# Patient Record
Sex: MALE | Race: WHITE | NOT HISPANIC OR LATINO | Employment: OTHER | URBAN - METROPOLITAN AREA
[De-identification: names, ages, dates, MRNs, and addresses within clinical notes are randomized per-mention and may not be internally consistent; named-entity substitution may affect disease eponyms.]

---

## 2018-02-08 ENCOUNTER — CONVERSION ENCOUNTER (OUTPATIENT)
Dept: FAMILY MEDICINE CLINIC | Facility: CLINIC | Age: 53
End: 2018-02-08

## 2018-02-08 ENCOUNTER — OFFICE VISIT CONVERTED (OUTPATIENT)
Dept: FAMILY MEDICINE CLINIC | Facility: CLINIC | Age: 53
End: 2018-02-08
Attending: NURSE PRACTITIONER

## 2018-03-08 ENCOUNTER — OFFICE VISIT CONVERTED (OUTPATIENT)
Dept: FAMILY MEDICINE CLINIC | Facility: CLINIC | Age: 53
End: 2018-03-08
Attending: NURSE PRACTITIONER

## 2018-04-26 ENCOUNTER — OFFICE VISIT CONVERTED (OUTPATIENT)
Dept: OTOLARYNGOLOGY | Facility: CLINIC | Age: 53
End: 2018-04-26
Attending: OTOLARYNGOLOGY

## 2018-06-19 ENCOUNTER — CONVERSION ENCOUNTER (OUTPATIENT)
Dept: OTOLARYNGOLOGY | Facility: CLINIC | Age: 53
End: 2018-06-19

## 2018-06-19 ENCOUNTER — OFFICE VISIT CONVERTED (OUTPATIENT)
Dept: OTOLARYNGOLOGY | Facility: CLINIC | Age: 53
End: 2018-06-19
Attending: OTOLARYNGOLOGY

## 2020-04-10 ENCOUNTER — HOSPITAL ENCOUNTER (OUTPATIENT)
Dept: GENERAL RADIOLOGY | Facility: HOSPITAL | Age: 55
Discharge: HOME | End: 2020-04-10
Attending: NURSE PRACTITIONER

## 2020-04-10 ENCOUNTER — OFFICE VISIT CONVERTED (OUTPATIENT)
Dept: FAMILY MEDICINE CLINIC | Facility: CLINIC | Age: 55
End: 2020-04-10
Attending: NURSE PRACTITIONER

## 2020-06-24 ENCOUNTER — CONVERSION ENCOUNTER (OUTPATIENT)
Dept: FAMILY MEDICINE CLINIC | Facility: CLINIC | Age: 55
End: 2020-06-24

## 2020-06-24 ENCOUNTER — OFFICE VISIT CONVERTED (OUTPATIENT)
Dept: FAMILY MEDICINE CLINIC | Facility: CLINIC | Age: 55
End: 2020-06-24
Attending: NURSE PRACTITIONER

## 2020-06-26 ENCOUNTER — HOSPITAL ENCOUNTER (OUTPATIENT)
Dept: LAB | Facility: HOSPITAL | Age: 55
Discharge: HOME | End: 2020-06-26
Attending: NURSE PRACTITIONER

## 2020-06-26 LAB
ALBUMIN SERPL-MCNC: 4.5 G/DL (ref 3.5–5)
ALBUMIN/GLOB SERPL: 1.6 {RATIO} (ref 1.4–2.6)
ALP SERPL-CCNC: 57 U/L (ref 56–119)
ALT SERPL-CCNC: 22 U/L (ref 10–40)
ANION GAP SERPL CALC-SCNC: 17 MMOL/L (ref 8–19)
AST SERPL-CCNC: 19 U/L (ref 15–50)
BASOPHILS # BLD AUTO: 0.03 10*3/UL (ref 0–0.2)
BASOPHILS NFR BLD AUTO: 0.5 % (ref 0–3)
BILIRUB SERPL-MCNC: 0.44 MG/DL (ref 0.2–1.3)
BUN SERPL-MCNC: 12 MG/DL (ref 5–25)
BUN/CREAT SERPL: 14 {RATIO} (ref 6–20)
CALCIUM SERPL-MCNC: 9.7 MG/DL (ref 8.7–10.4)
CHLORIDE SERPL-SCNC: 100 MMOL/L (ref 99–111)
CHOLEST SERPL-MCNC: 175 MG/DL (ref 107–200)
CHOLEST/HDLC SERPL: 3.8 {RATIO} (ref 3–6)
CONV ABS IMM GRAN: 0.02 10*3/UL (ref 0–0.2)
CONV CO2: 21 MMOL/L (ref 22–32)
CONV IMMATURE GRAN: 0.3 % (ref 0–1.8)
CONV TOTAL PROTEIN: 7.3 G/DL (ref 6.3–8.2)
CREAT UR-MCNC: 0.83 MG/DL (ref 0.7–1.2)
DEPRECATED RDW RBC AUTO: 41.7 FL (ref 35.1–43.9)
EOSINOPHIL # BLD AUTO: 0.25 10*3/UL (ref 0–0.7)
EOSINOPHIL # BLD AUTO: 4.2 % (ref 0–7)
ERYTHROCYTE [DISTWIDTH] IN BLOOD BY AUTOMATED COUNT: 12.2 % (ref 11.6–14.4)
GFR SERPLBLD BASED ON 1.73 SQ M-ARVRAT: >60 ML/MIN/{1.73_M2}
GLOBULIN UR ELPH-MCNC: 2.8 G/DL (ref 2–3.5)
GLUCOSE SERPL-MCNC: 96 MG/DL (ref 70–99)
HCT VFR BLD AUTO: 43.5 % (ref 42–52)
HDLC SERPL-MCNC: 46 MG/DL (ref 40–60)
HGB BLD-MCNC: 14.8 G/DL (ref 14–18)
LDLC SERPL CALC-MCNC: 89 MG/DL (ref 70–100)
LYMPHOCYTES # BLD AUTO: 2.21 10*3/UL (ref 1–5)
LYMPHOCYTES NFR BLD AUTO: 36.8 % (ref 20–45)
MCH RBC QN AUTO: 31.6 PG (ref 27–31)
MCHC RBC AUTO-ENTMCNC: 34 G/DL (ref 33–37)
MCV RBC AUTO: 92.8 FL (ref 80–96)
MONOCYTES # BLD AUTO: 0.73 10*3/UL (ref 0.2–1.2)
MONOCYTES NFR BLD AUTO: 12.2 % (ref 3–10)
NEUTROPHILS # BLD AUTO: 2.76 10*3/UL (ref 2–8)
NEUTROPHILS NFR BLD AUTO: 46 % (ref 30–85)
NRBC CBCN: 0 % (ref 0–0.7)
OSMOLALITY SERPL CALC.SUM OF ELEC: 278 MOSM/KG (ref 273–304)
PLATELET # BLD AUTO: 286 10*3/UL (ref 130–400)
PMV BLD AUTO: 9.7 FL (ref 9.4–12.4)
POTASSIUM SERPL-SCNC: 4.3 MMOL/L (ref 3.5–5.3)
PSA SERPL-MCNC: 1.69 NG/ML (ref 0–4)
RBC # BLD AUTO: 4.69 10*6/UL (ref 4.7–6.1)
SODIUM SERPL-SCNC: 134 MMOL/L (ref 135–147)
T4 FREE SERPL-MCNC: 1 NG/DL (ref 0.9–1.8)
TRIGL SERPL-MCNC: 199 MG/DL (ref 40–150)
TSH SERPL-ACNC: 3.52 M[IU]/L (ref 0.27–4.2)
VLDLC SERPL-MCNC: 40 MG/DL (ref 5–37)
WBC # BLD AUTO: 6 10*3/UL (ref 4.8–10.8)

## 2020-07-21 ENCOUNTER — TELEMEDICINE CONVERTED (OUTPATIENT)
Dept: FAMILY MEDICINE CLINIC | Facility: CLINIC | Age: 55
End: 2020-07-21
Attending: NURSE PRACTITIONER

## 2020-10-27 ENCOUNTER — HOSPITAL ENCOUNTER (OUTPATIENT)
Dept: LAB | Facility: HOSPITAL | Age: 55
Discharge: HOME | End: 2020-10-27
Attending: INTERNAL MEDICINE

## 2020-10-27 ENCOUNTER — OFFICE VISIT CONVERTED (OUTPATIENT)
Dept: FAMILY MEDICINE CLINIC | Facility: CLINIC | Age: 55
End: 2020-10-27
Attending: INTERNAL MEDICINE

## 2020-10-27 LAB
BASOPHILS # BLD AUTO: 0.04 10*3/UL (ref 0–0.2)
BASOPHILS NFR BLD AUTO: 0.5 % (ref 0–3)
CONV ABS IMM GRAN: 0.03 10*3/UL (ref 0–0.2)
CONV IMMATURE GRAN: 0.4 % (ref 0–1.8)
DEPRECATED RDW RBC AUTO: 41.3 FL (ref 35.1–43.9)
EOSINOPHIL # BLD AUTO: 0.31 10*3/UL (ref 0–0.7)
EOSINOPHIL # BLD AUTO: 4.1 % (ref 0–7)
ERYTHROCYTE [DISTWIDTH] IN BLOOD BY AUTOMATED COUNT: 12.3 % (ref 11.6–14.4)
HCT VFR BLD AUTO: 43.1 % (ref 42–52)
HGB BLD-MCNC: 14.7 G/DL (ref 14–18)
LYMPHOCYTES # BLD AUTO: 2.76 10*3/UL (ref 1–5)
LYMPHOCYTES NFR BLD AUTO: 36.5 % (ref 20–45)
MCH RBC QN AUTO: 31.4 PG (ref 27–31)
MCHC RBC AUTO-ENTMCNC: 34.1 G/DL (ref 33–37)
MCV RBC AUTO: 92.1 FL (ref 80–96)
MONOCYTES # BLD AUTO: 0.8 10*3/UL (ref 0.2–1.2)
MONOCYTES NFR BLD AUTO: 10.6 % (ref 3–10)
NEUTROPHILS # BLD AUTO: 3.62 10*3/UL (ref 2–8)
NEUTROPHILS NFR BLD AUTO: 47.9 % (ref 30–85)
NRBC CBCN: 0 % (ref 0–0.7)
PLATELET # BLD AUTO: 307 10*3/UL (ref 130–400)
PMV BLD AUTO: 9.3 FL (ref 9.4–12.4)
RBC # BLD AUTO: 4.68 10*6/UL (ref 4.7–6.1)
WBC # BLD AUTO: 7.56 10*3/UL (ref 4.8–10.8)

## 2020-12-04 ENCOUNTER — HOSPITAL ENCOUNTER (OUTPATIENT)
Dept: URGENT CARE | Facility: CLINIC | Age: 55
Discharge: HOME | End: 2020-12-04
Attending: PHYSICIAN ASSISTANT

## 2020-12-09 LAB — SARS-COV-2 RNA SPEC QL NAA+PROBE: NOT DETECTED

## 2021-04-02 ENCOUNTER — CONVERSION ENCOUNTER (OUTPATIENT)
Dept: INTERNAL MEDICINE | Facility: CLINIC | Age: 56
End: 2021-04-02

## 2021-04-02 ENCOUNTER — OFFICE VISIT CONVERTED (OUTPATIENT)
Dept: INTERNAL MEDICINE | Facility: CLINIC | Age: 56
End: 2021-04-02
Attending: STUDENT IN AN ORGANIZED HEALTH CARE EDUCATION/TRAINING PROGRAM

## 2021-04-02 ENCOUNTER — HOSPITAL ENCOUNTER (OUTPATIENT)
Dept: OTHER | Facility: HOSPITAL | Age: 56
Discharge: HOME | End: 2021-04-02
Attending: STUDENT IN AN ORGANIZED HEALTH CARE EDUCATION/TRAINING PROGRAM

## 2021-04-02 LAB
ALBUMIN SERPL-MCNC: 4.7 G/DL (ref 3.5–5)
ALBUMIN/GLOB SERPL: 1.7 {RATIO} (ref 1.4–2.6)
ALP SERPL-CCNC: 62 U/L (ref 56–119)
ALT SERPL-CCNC: 21 U/L (ref 10–40)
ANION GAP SERPL CALC-SCNC: 18 MMOL/L (ref 8–19)
AST SERPL-CCNC: 23 U/L (ref 15–50)
BASOPHILS # BLD AUTO: 0.04 10*3/UL (ref 0–0.2)
BASOPHILS NFR BLD AUTO: 0.5 % (ref 0–3)
BILIRUB SERPL-MCNC: 0.36 MG/DL (ref 0.2–1.3)
BUN SERPL-MCNC: 10 MG/DL (ref 5–25)
BUN/CREAT SERPL: 11 {RATIO} (ref 6–20)
CALCIUM SERPL-MCNC: 9.8 MG/DL (ref 8.7–10.4)
CHLORIDE SERPL-SCNC: 96 MMOL/L (ref 99–111)
CHOLEST SERPL-MCNC: 186 MG/DL (ref 107–200)
CHOLEST/HDLC SERPL: 3.3 {RATIO} (ref 3–6)
CONV ABS IMM GRAN: 0.03 10*3/UL (ref 0–0.2)
CONV CO2: 24 MMOL/L (ref 22–32)
CONV IMMATURE GRAN: 0.4 % (ref 0–1.8)
CONV TOTAL PROTEIN: 7.4 G/DL (ref 6.3–8.2)
CREAT UR-MCNC: 0.92 MG/DL (ref 0.7–1.2)
DEPRECATED RDW RBC AUTO: 42.4 FL (ref 35.1–43.9)
EOSINOPHIL # BLD AUTO: 0.21 10*3/UL (ref 0–0.7)
EOSINOPHIL # BLD AUTO: 2.6 % (ref 0–7)
ERYTHROCYTE [DISTWIDTH] IN BLOOD BY AUTOMATED COUNT: 12.4 % (ref 11.6–14.4)
FOLATE SERPL-MCNC: 10.5 NG/ML (ref 4.8–20)
GFR SERPLBLD BASED ON 1.73 SQ M-ARVRAT: >60 ML/MIN/{1.73_M2}
GLOBULIN UR ELPH-MCNC: 2.7 G/DL (ref 2–3.5)
GLUCOSE SERPL-MCNC: 124 MG/DL (ref 70–99)
HCT VFR BLD AUTO: 45.1 % (ref 42–52)
HDLC SERPL-MCNC: 56 MG/DL (ref 40–60)
HGB BLD-MCNC: 15.1 G/DL (ref 14–18)
LDLC SERPL CALC-MCNC: 102 MG/DL (ref 70–100)
LYMPHOCYTES # BLD AUTO: 2.57 10*3/UL (ref 1–5)
LYMPHOCYTES NFR BLD AUTO: 32.2 % (ref 20–45)
MCH RBC QN AUTO: 31.1 PG (ref 27–31)
MCHC RBC AUTO-ENTMCNC: 33.5 G/DL (ref 33–37)
MCV RBC AUTO: 92.8 FL (ref 80–96)
MONOCYTES # BLD AUTO: 0.78 10*3/UL (ref 0.2–1.2)
MONOCYTES NFR BLD AUTO: 9.8 % (ref 3–10)
NEUTROPHILS # BLD AUTO: 4.34 10*3/UL (ref 2–8)
NEUTROPHILS NFR BLD AUTO: 54.5 % (ref 30–85)
NRBC CBCN: 0 % (ref 0–0.7)
OSMOLALITY SERPL CALC.SUM OF ELEC: 276 MOSM/KG (ref 273–304)
PLATELET # BLD AUTO: 289 10*3/UL (ref 130–400)
PMV BLD AUTO: 10.1 FL (ref 9.4–12.4)
POTASSIUM SERPL-SCNC: 4.7 MMOL/L (ref 3.5–5.3)
RBC # BLD AUTO: 4.86 10*6/UL (ref 4.7–6.1)
SODIUM SERPL-SCNC: 133 MMOL/L (ref 135–147)
TRIGL SERPL-MCNC: 138 MG/DL (ref 40–150)
TSH SERPL-ACNC: 3.25 M[IU]/L (ref 0.27–4.2)
VIT B12 SERPL-MCNC: 583 PG/ML (ref 211–911)
VLDLC SERPL-MCNC: 28 MG/DL (ref 5–37)
WBC # BLD AUTO: 7.97 10*3/UL (ref 4.8–10.8)

## 2021-04-03 LAB — HCV AB SER DONR QL: <0.1 S/CO RATIO (ref 0–0.9)

## 2021-05-10 NOTE — H&P
"   History and Physical      Patient Name: Eddie Casas   Patient ID: [id number]   Sex: Male   YOB: 1965    Primary Care Provider: Jose Antonio Rowan PA-C   Referring Provider: Mike Shane DO    Visit Date: 2021    Provider: Sid Mcknight MD   Location: [facility] [place]   Location Address: [address] [place] [address]; [address]  [place], [place]  [address]   Location Phone: [phone]          Chief Complaint  · Est care  · shortness of breathe   · Chest pain   · Fatigue       History Of Present Illness  Eddie Casas is a 55 year old /White male who presents for evaluation and treatment of:      10 years ago, had second covid immunization today  Colon cancer screening: last colonoscopy 4 years ago, showing diverticuli and polyps, recommended repeat in 5 years  \">here to establish care, as well as with complaints of fatigue and ED follow up for diverticulitis/rectal bleeding.  Previous PCP: Millie Bajwa.    Fatigue:  present for several years, worsening over time.  Reports having episodes where legs feel leaden, feeling so weak would have to sit down, and SOA with minimal exertion.  Does have chest pain sporadically (center of chest, for minutes at a time, about once a month, with no triggers, no exacerbating and no alleviating factors).    Has a history of hiatal hernia, and is on PPI    He denies issues with sleep.  Per his report, wife has never said that he snores or gasps in his sleep.    He is concerned about his heart because father and grandfather both  of heart problems before reaching age 70.    Rectal bleeding:    Presented to ED late March with rectal bleeding and elevated BP (hadn't taken BP meds that day).  Imaging showed colonic wall thickening and pericolnic inflammation, descending colon, mild colitis as well as  sigmoid diverticulosis.  CBC and coags unremarkable.  Sent home on augmentin.  Took 8 days but discontinued early due to " diarrhea.  No further rectal bleeding.    Tobacco:    quit about 1 year ago.  Smoked about 3 cigarettes a day from age 17 until then.    Urinary urgency:    on mirabegron    Leg swelling:    a chronic problem.  previously on diuretic, but said he had problems with his bloodwork while on HCTZ and it was therefore discontinued.  Has had 2 vascular procedures left leg to correct problems with venous return but has been informed by vascular can do nothing further to help from their service.    Health Maintenance:  Immunizations: has received flu shot this season, has received shingles shot x 2, last tetanus booster > 10 years ago, had second covid immunization today  Colon cancer screening: last colonoscopy 4 years ago, showing diverticuli and polyps, recommended repeat in 5 years         Past Medical History  Disease Name Date Onset Notes   Degenerative disc disease, lumbar 03/16/2016 --    Edema Lower Extremity --  --    Essential hypertension 03/16/2016 --    GERD --  --    High cholesterol 02/05/2014 --    Hydrocele --  --    Screening PSA (prostate specific antigen) 10/28/2016 --    Tinnitus, bilateral --  --    Weakness of left lower extremity 03/16/2016 --          Past Surgical History  Procedure Name Date Notes   Colonoscopy 1/13/2017 --    Hydrocelectomy 2015 x2         Medication List  Name Date Started Instructions   atorvastatin 20 mg oral tablet 10/27/2020 TAKE 1 TABLET BY MOUTH ONCE DAILY AT BEDTIME   losartan 50 mg oral tablet 10/27/2020 TAKE 1 TABLET BY MOUTH ONCE DAILY   Myrbetriq 50 mg oral tablet extended release 24 hr 10/27/2020 take 1 tablet (50 mg) by oral route once daily swallowing whole with water. Do not crush, chew and/or divide. for 90 days   omeprazole 20 mg oral capsule,delayed release(DR/EC)  take 1 capsule (20 mg) by oral route twice a day before a meal         Allergy List  Allergen Name Date Reaction Notes   NO KNOWN DRUG ALLERGIES --  --  --          Family Medical History  Disease  "Name Relative/Age Notes   Breast Neoplasm, Malignant Grandmother (maternal)/  Grandmother (paternal)/   --    Dementia, Senile Mother/   --    Stroke  Paternal grandparent   Heart Disease Father/   --    Diabetes, unspecified type Father/   --    Hypertension Father/  Grandmother (paternal)/  Mother/   --    Epilepsy (Seizures) Mother/   --          Social History  Finding Status Start/Stop Quantity Notes   Alcohol Light --/-- --  --     --  --/-- --  --    Minimal Amount of Exercise (Once weekly or less) --  --/-- --  --    No known infection risk --  --/-- --  --    Tobacco Former 20/50 socially Quit 2017         Immunizations  NameDate Admin Mfg Trade Name Lot Number Route Inj VIS Given VIS Publication   Bxbiesyev49/18/2019 SEQ Not Entered [id number] IM RA 09/18/2019    Comments: [email or web address] administered vaccine         Review of Systems  · Constitutional  o Denies  o : fever, fatigue, weight loss, weight gain  · Cardiovascular  o Denies  o : lower extremity edema, claudication, chest pressure, palpitations  · Respiratory  o Denies  o : shortness of breath, wheezing, cough, hemoptysis, dyspnea on exertion  · Gastrointestinal  o Denies  o : nausea, vomiting, diarrhea, constipation, abdominal pain      Vitals  Date Time BP Position Site L\R Cuff Size HR RR TEMP (F) WT  HT  BMI kg/m2 BSA m2 O2 Sat FR L/min FiO2 HC       06/24/2020 04:22 /86 Sitting    90 - R   224lbs 2oz 5'  10\" 32.16 2.24 100 %      10/27/2020 02:28 /73 Sitting    95 - R  98.8 224lbs 8oz 5'  10\" 32.21 2.24 98 %  21%    04/02/2021 01:21 /83 Sitting    102 - R  98.3 213lbs 16oz 5'  9\" 31.6 2.17 98 %  21%    04/02/2021 02:11 /78 Sitting                       Physical Examination  · Constitutional  o Appearance  o : no acute distress, well-nourished  · Head and Face  o Head  o :   § Inspection  § : atraumatic, normocephalic  · Eyes  o Eyes  o : extraocular movements intact, no scleral icterus, no conjunctival injection  · Ears, " Nose, Mouth and Throat  o Ears  o :   § External Ears  § : normal  o Nose  o :   § Intranasal Exam  § : nares patent  o Oral Cavity  o :   § Oral Mucosa  § : moist mucous membranes  · Neck  o Thyroid  o : gland size normal, nontender, no nodules or masses present on palpation, symmetric  · Respiratory  o Respiratory Effort  o : breathing comfortably, symmetric chest rise  o Auscultation of Lungs  o : clear to asculatation bilaterally, no wheezes, rales, or rhonchii  · Cardiovascular  o Heart  o :   § Auscultation of Heart  § : regular rate and rhythm, no murmurs, rubs, or gallops, no displaced PMI, no JVD  o Peripheral Vascular System  o :   § Extremities  § : 1+ LE edema bilaterally  · Gastrointestinal  o Abdominal Examination  o :   § Abdomen  § : bowel sounds present, non-distended, non-tender  · Neurologic  o Mental Status Examination  o :   § Orientation  § : grossly oriented to person, place and time  o Gait and Station  o :   § Gait Screening  § : normal gait  · Psychiatric  o General  o : normal mood and affect              Assessment  · Annual physical exam     V70.0/Z00.00  -recent covid immunization, will obtain tetanus booster next visit  · Chest pain     786.50/R07.9  -history suggests non-cardiac in etiology  -possibly related to patient's GERD and hiatal hernia  · Essential hypertension     401.9/I10  -146/78, above goal of < 130/80  -have asked him to measure BP daily at home next 2-3 weeks and call with measurements, will adjust regimen based on these results  · Fatigue     780.79/R53.83  -low index of suspicion for cardiac, pulmonary or neurological etiology (received MRI for evaluation of possible MS)  -no anemia on ED labs in March  -will check routine labs, including TSH, B12/folate  -will consider sleep study at next visit (STOPBANG 5 - Tired, HTN, Age, Neck Size, Gender)  · GERD (gastroesophageal reflux disease)     530.81/K21.9  -on PPI  · Hyperlipidemia     272.4/E78.5  -on  statin  · Obesity     278.00/E66.9  -recommended 3-4 moderate exercise sessions a week (30-40 min in duration)  -recommended cutting out in between meal snacks, discussed appropriation portion sizes, and recommended eliminating liquid calories  -discussed the components of the Mediterranean and DASH diet; his sodium goal should be < 1,500 mg a day  · Need for hepatitis C screening test     V73.89/Z11.59  · Diverticulosis     562.10/K57.90  -will refer for colonoscopy. Should ideally be 6 weeks or later after 3/23/2021 ED trip (May 4th or later)  · Colitis     558.9/K52.9      Plan  · Orders  o Physical, Primary Care Panel (CBC, CMP, Lipid, TSH) [facility] (60744, 22297, 52942, 92527) - V70.0/Z00.00, 780.79/R53.83, 278.00/E66.9, 272.4/E78.5 - 04/02/2021  o ACO-14: Influenza immunization administered or previously received [facility] () - V70.0/Z00.00, 780.79/R53.83, 278.00/E66.9 - 04/02/2021  o ACO-39: Current medications updated and reviewed (, 1159F) - V70.0/Z00.00, 780.79/R53.83, 278.00/E66.9, 272.4/E78.5 - 04/02/2021  o Hepatitis C antibody (33444) - V70.0/Z00.00, 780.79/R53.83, V73.89/Z11.59 - 04/02/2021  o B12 Folate levels (B12FO) - V70.0/Z00.00, 780.79/R53.83, 278.00/E66.9 - 04/02/2021  o REFERRAL FOR COLONOSCOPY (COLON) - 562.10/K57.90, 558.9/K52.9 - 04/02/2021   May 4th or later  · Medications  o Medications have been Reconciled  o Transition of Care or Provider Policy  · Instructions  o Reviewed health maintenance flowsheet and updated information. Orders were placed and/or patient's response was documented.  o Advised that cheeses and other sources of dairy fats, animal fats, fast food, and the extras (candy, pastries, pies, doughnuts and cookies) all contain LDL raising nutrients. Advised to increase fruits, vegetables, whole grains, and to monitor portion sizes.   o Medicare suggests a once in a lifetime screening for Hepatitis C for all Medicare beneficiaries born between 2071-7610.  o Patient was  educated/instructed on their diagnosis, treatment and medications prior to discharge from the clinic today.  · Disposition  o Return Visit Request in/on 3 months +/- 2 days (21347).            Electronically Signed by: Sid Mcknight MD -Author on April 2, 2021 02:37:31 PM

## 2021-05-12 NOTE — PROGRESS NOTES
Progress Note      Patient Name: Eddie Casas   Patient ID: [id number]   Sex: Male   YOB: 1965    Primary Care Provider: Sid Rose MD   Referring Provider: Karon DE LOS SANTOS    Visit Date: April 10, 2020    Provider: FILIBERTO Hodges   [facility]   Location Address: [address] [place] [address], [address]  [place], [place]  [id number]   Location Phone: [phone]          Chief Complaint  · Shortness of breath  · Cough     PT c/o shortness of breath associated with some chest tightness and a slight cough    Cough is non-productive    States that the shortness of breath has increased over the last couple days    No fever noted at this time    Does c/o of recurrent and persistent headache    BP is elevated today (154/92 bpm).  No blurred vision or dizziness noted at this time       History Of Present Illness  Eddie Casas is a 54 year old /White male who presents for evaluation and treatment of:      he presents today in the respiratory clinic at the request of Lizzette DE LOS SANTOS he has been feeling more short of breath for 3 days noticed air hunger and today chest tightness and he denies cough denies fever and he has h/o emphysema and hiatal hernia and his indigestion has been acting up lately and yesterday he had to take an extra  omeprazole and it has improved.  He has HA frontal started today and voice hoarseness       Review of Systems  · Constitutional  o Admits  o : m/f  o Denies  o : fever  · Eyes  o Denies  o : double vision, blurred vision  · HENT  o Admits  o : voice hoarseness  o Denies  o : sinus congestion/drainage, ear pain, sore throat,   · Cardiovascular  o Admits  o : chest tightness, air hunger  o Denies  o : chest pain, irregular heart beats  · Respiratory  o Admits  o : soa, air hunger, h/o COPD  o Denies  o : productive cough  · Gastrointestinal  o Admits  o : hiatal hernia, gerd, worsening indigestion  o Denies  o : nausea,  "vomiting  · Integument  o Denies  o : hair growth change, new skin lesions  · Neurologic  o Admits  o : frontal HA  · Musculoskeletal  o Denies  o : joint swelling, limitation of motion  · Allergic-Immunologic  o Admits  o : seasonal allergies      Vitals  Date Time BP Position Site L\R Cuff Size HR RR TEMP (F) WT  HT  BMI kg/m2 BSA m2 O2 Sat HC       04/10/2020 03:19 /92 Sitting    93 - R  98.6  5'  10\"   98 %          Physical Examination  · Constitutional  o Appearance  o : well-nourished, well developed, alert, in no acute distress  · Head and Face  o Head  o :   § Inspection  § : atraumatic, normocephalic  § Palpation  § : no tenderness or masses present  o Face  o :   § Inspection  § : no facial lesions  § Palpation  § : no sinus tenderness on palpation  · Ears, Nose, Mouth and Throat  o Ears  o :   § External Ears  § : appearance within normal limits, no lesions present  § Otoscopic Examination  § : tympanic membrane appearance within normal limits bilaterally without perforations, mobility normal  o Nose  o :   § External Nose  § : normal stucture noted.  o Oral Cavity  o :   § Oral Mucosa  § : oral mucosa normal without pallor or cyanosis  § Lips  § : lip appearance normal  § Teeth  § : normal dentition for age  § Gums  § : gums pink, non-swollen, no bleeding present  § Tongue  § : tongue appearance normal  § Palate  § : hard palate normal, soft palate appearance normal  o Throat  o :   § Oropharynx  § : oropharynx inflammation present   · Respiratory  o Respiratory Effort  o : breathing unlabored  o Auscultation of Lungs  o : normal breath sounds throughout  · Cardiovascular  o Heart  o :   § Auscultation of Heart  § : regular rate and rhythm, no murmurs, gallops or rubs  § Palpation of Heart  § : normal apical impulse, no cardiac thrill present  o Peripheral Vascular System  o :   § Carotid Arteries  § : normal pulses bilaterally, no bruits present  § Extremities  § : no cyanosis, clubbing or edema; " less than 2 second refill noted  · Gastrointestinal  o Abdominal Examination  o : abdomen tender to palpation epigastric, tone normal without rigidity or guarding, no masses present, abdominal contour scaphoid  o Liver and spleen  o : no hepatomegaly present, liver nontender to palpation, spleen not palpable  · Skin and Subcutaneous Tissue  o General Inspection  o : no rashes or lesions present, no areas of discoloration  · Neurologic  o Mental Status Examination  o :   § Orientation  § : grossly oriented to person, place and time  o Cranial Nerves  o : cranial nerves intact and symmetric throughout  · Psychiatric  o Mood and Affect  o : mood normal, affect appropriate, denies any SI/HI     epigastric pain with palpation           Results  · In-Office Procedures  o Lab procedure  § IOP - Influenza A/B Test (75541)   § Influenza A: Negative   § Influenza B: Negative   § Internal Control Verified?: Yes   § IOP - Rapid Strep (47154)   § Beta Strep Gp A Culture: Negative   § Internal Control Verified?: Yes       Assessment  · Emphysema of lung     492.8/J43.9  · GERD (gastroesophageal reflux disease)     530.81/K21.9  · SOB (shortness of breath)     786.05/R06.02  · Shortness of breath     786.05/R06.02  · Chest tightness     786.59/R07.89  · Air hunger     786.09/R09.89  · Hiatal hernia     553.3/K44.9    Problems Reconciled  Plan  · Orders  o Chest 2 views (AP PA and Lateral) [facility] (85211) - 492.8/J43.9 - 04/10/2020  o ACO-39: Current medications updated and reviewed () - - 04/10/2020  · Medications  o prednisone 20 mg oral tablet   SIG: take 2 tablets (40 mg) by oral route once daily for 5 days   DISP: (10) tablets with 0 refills  Prescribed on 04/10/2020     o Medications have been Reconciled  o Transition of Care or Provider Policy  · Instructions  o Handouts were given to patient:   o Patient is taking medications as prescribed and doing well.   o Take all medications as  prescribed/directed.  o Patient was educated/instructed on their diagnosis, treatment and medications prior to discharge from the clinic today.  o Patient instructed to seek medical attention urgently for new or worsening symptoms.  o Call the office with any concerns or questions.  o Chronic conditions reviewed and taken into consideration for today's treatment plan.  o increase omeprazole to bid  · Disposition  o Call or Return if symptoms worsen or persist.  o follow up prn or as needed  o Care Transition            Electronically Signed by: FILIBERTO Hodges -Author on April 13, 2020 08:51:17 PM

## 2021-05-13 NOTE — PROGRESS NOTES
Progress Note      Patient Name: Eddie Casas   Patient ID: [id number]   Sex: Male   YOB: 1965    Primary Care Provider: Cirstal DE LOS SANTOS   Referring Provider: Cristal DE LOS SANTOS    Visit Date: June 24, 2020    Provider: FILIBERTO Hdoges   Location: [facility]   Location Address: [address] [place] [address], [address]  [place], [place]  [id number]   Location Phone: [phone]          Chief Complaint  · Routine f/u  · Due labs     Patient is here for a routine f/u on medications.  He is needing refills on all his medications.    C/o weight gain and slower metabolism.    Due labs.    Last colonoscopy: 2017       History Of Present Illness  Eddie Casas is a 54 year old /White male who presents for evaluation and treatment of:      the patient presents in the office today and he has h/o BPH, HTN, gerd, hyperlipidemia he has not had labs done in 3 years and he has c/o ankle swelling, continued freq urg of urine he denies symptoms bladder outlet obstruction like retention weak stream dribbling he continue to have nocturia 3-4 times a night he has ankle swelling he used to be on hctz stopped because he thought created bladder issue       Past Medical History  Disease Name Date Onset Notes   Degenerative disc disease, lumbar 03/16/2016 --    Edema Lower Extremity --  --    Essential hypertension 03/16/2016 --    GERD --  --    High cholesterol 02/05/2014 --    Hydrocele --  --    Screening PSA (prostate specific antigen) 10/28/2016 --    Tinnitus, bilateral --  --    Weakness of left lower extremity 03/16/2016 --          Past Surgical History  Procedure Name Date Notes   Colonoscopy 1/13/2017 --    Hydrocelectomy 2015 x2         Medication List  Name Date Started Instructions   atorvastatin 20 mg oral tablet 06/24/2020 TAKE 1 TABLET BY MOUTH ONCE DAILY AT BEDTIME   losartan 50 mg oral tablet 06/24/2020 TAKE 1 TABLET BY MOUTH ONCE DAILY   omeprazole 20 mg oral capsule,delayed release(/EC)   take 1 capsule (20 mg) by oral route once daily before a meal   tamsulosin 0.4 mg oral capsule 06/24/2020 2 po qhs         Allergy List  Allergen Name Date Reaction Notes   NO KNOWN DRUG ALLERGIES --  --  --        Allergies Reconciled  Family Medical History  Disease Name Relative/Age Notes   Breast Neoplasm, Malignant Grandmother (maternal)/  Grandmother (paternal)/   --    Dementia, Senile Mother/   --    Stroke  Paternal grandparent   Heart Disease Father/   --    Diabetes, unspecified type Father/   --    Hypertension Father/  Grandmother (paternal)/  Mother/   --    Epilepsy (Seizures) Mother/   --          Social History  Finding Status Start/Stop Quantity Notes   Alcohol Light --/-- --  --     --  --/-- --  --    Minimal Amount of Exercise (Once weekly or less) --  --/-- --  --    No known infection risk --  --/-- --  --    Tobacco Former 20/50 socially Quit 2017         Immunizations  NameDate Admin Mfg Trade Name Lot Number Route Inj VIS Given VIS Publication   [id number]/18/2019 SEQ Not Entered [id number] RA 09/18/2019    Comments: [employer] administered vaccine         Review of Systems  · Constitutional  o Denies  o : fever, weight gain, weight loss, malaise/fatigue  · Eyes  o Denies  o : diplopia, recent changes, blurred vision,   · HENT  o Denies  o : sore throat, hearing changes, tinnitus, nose bleeding, sinus pain, nasal discharge, ear pain  · Breasts  o Denies  o : lumps, tenderness, swelling, nipple discharge  · Cardiovascular  o Admits  o : swelling in ankles   o Denies  o : palpitation, chest pain, claudication,  · Respiratory  o Denies  o : shortness of breath, JOYNER,  · Gastrointestinal  o Admits  o : gerd  o Denies  o : nausea, vomiting, diarrhea, constipation, abdominal pain  · Genitourinary  o Admits  o : freq urg, nocturia, bph, OAB  o Denies  o : dysuria, incontinence,  · Neurologic  o Denies  o : unsteady gait, weakness, dizziness, H/A  · Musculoskeletal  o Denies  o : myalgias,  "joint pain, joint swelling  · Endocrine  o Denies  o : heat intolerance, cold intolerance, polyuria, polydipsia  · Psychiatric  o Denies  o : mood changes, hallucinations, memory      Vitals  Date Time BP Position Site L\R Cuff Size HR RR TEMP (F) WT  HT  BMI kg/m2 BSA m2 O2 Sat HC       06/24/2020 04:22 /86 Sitting    90 - R   224lbs 2oz 5'  10\" 32.16 2.24 100 %          Physical Examination  · Constitutional  o Appearance  o : well-nourished, well developed, alert, in no acute distress  · Respiratory  o Respiratory Effort  o : breathing unlabored  o Auscultation of Lungs  o : normal breath sounds throughout  · Cardiovascular  o Heart  o :   § Auscultation of Heart  § : regular rate and rhythm, no murmurs, gallops or rubs  § Palpation of Heart  § : normal apical impulse, no cardiac thrill present  o Peripheral Vascular System  o :   § Carotid Arteries  § : normal pulses bilaterally, no bruits present  § Extremities  § : 2 edema present, no cyanosis, no distal hair loss, normal capillary refill  · Skin and Subcutaneous Tissue  o General Inspection  o : no rashes or lesions present, no areas of discoloration  · Neurologic  o Mental Status Examination  o :   § Orientation  § : grossly oriented to person, place and time  o Cranial Nerves  o : cranial nerves intact and symmetric throughout  · Psychiatric  o Mood and Affect  o : mood normal, affect appropriate, denies any SI/HI              Assessment  · BPH (benign prostatic hyperplasia)     600.00/N40.0  · Essential hypertension     401.9/I10  · Other long term (current) drug therapy     V58.69/Z79.899  · Screening for depression     V79.0/Z13.89  · Screening for prostate cancer     V76.44/Z12.5  · Edema Lower Extremity     782.3  · High cholesterol     272.0/E78.0  · GERD     530.81  · Nocturia     788.43/R35.1  · Urinary frequency     788.41/R35.0  · Urinary urgency     788.63/R39.15      Plan  · Orders  o PSA Ultrasensitive, ANNUAL SCREENING [facility] (15179, ) " - V76.44/Z12.5 - 2020  o CBC with Auto Diff [facility] (05811) - - 2020  o CMP [facility] (01543) - - 2020  o Lipid Panel [facility] (47005) - - 2020  o Thyroid Profile (36883, 76930, THYII) - - 2020  o ACO-39: Current medications updated and reviewed () - - 2020  o ACO-18: Negative screen for clinical depression using a standardized tool () - - 2020  o ACO-14: Influenza immunization administered or previously received () - - 2020  o ACO-19: Colorectal cancer screening results documented and reviewed (3017F) - - 2020  o ACO-13: Fall Risk Screening with no falls in past year or only one fall without injury in the past year (1101F) - - 2020  · Medications  o Myrbetriq 25 mg oral tablet extended release 24 hr   SIG: take 1 tablet (25 mg) by oral route once daily swallowing whole with water. Do not crush, chew and/or divide. for 30 days   DISP: (30) tablets with 0 refills  Prescribed on 2020     o tamsulosin 0.4 mg oral capsule   SI po qhs   DISP: (180) capsules with 3 refills  Adjusted on 2020     o atorvastatin 20 mg oral tablet   SIG: TAKE 1 TABLET BY MOUTH ONCE DAILY AT BEDTIME   DISP: (30) Tablet with 5 refills  Refilled on 2020     o losartan 50 mg oral tablet   SIG: TAKE 1 TABLET BY MOUTH ONCE DAILY   DISP: (30) Each with 3 refills  Refilled on 2020     o Medications have been Reconciled  o Transition of Care or Provider Policy  · Instructions  o Depression Screen completed and scanned into the EMR under the designated folder within the patient's documents.  o Handouts were given to patient:   o Patient is taking medications as prescribed and doing well.   o Take all medications as prescribed/directed.  o Patient was educated/instructed on their diagnosis, treatment and medications prior to discharge from the clinic today.  o Patient instructed to seek medical attention urgently for new or worsening symptoms.  o Call the office with  any concerns or questions.  o Risks, benefits, and alternatives were discussed with the patient. The patient is aware of risks associated with: medications  o Chronic conditions reviewed and taken into consideration for today's treatment plan.  · Disposition  o Call or Return if symptoms worsen or persist.  o follow up 2 weeks  o follow up prn or as needed  o Care Transition            Electronically Signed by: FILIBERTO Hodges -Author on June 24, 2020 08:16:24 PM

## 2021-05-13 NOTE — PROGRESS NOTES
Progress Note      Patient Name: Eddie Casas   Patient ID: [id number]   Sex: Male   YOB: 1965    Primary Care Provider: Cristal DE LOS SANTOS   Referring Provider: Cristal DE LOS SANTOS    Visit Date: July 21, 2020    Provider: FILIBERTO Hodges   Location: [facility]   Location Address: [address] [place] [address], [address]  [place], [place]  [id number]   Location Phone: [phone]          Chief Complaint  · Nocturia  · OAB      History Of Present Illness  Video Conferencing Visit  Eddie Casas is a 54 year old /White male who is presenting for evaluation via video conferencing via [employer] Verbal consent obtained before beginning visit.   The following staff were present during this visit: sAhley Angelo MA   Eddie Casas is a 54 year old /White male who presents for evaluation and treatment of:      the patient presents today in follow up on [employer] and he was recently seen he was having nocturia on Flomax several times a night and pedal edema he was on HCTZ in the past but it was stopped due to nocturia however the nocturia did not improve ...at his last visit his Flomax was increased and myrbetriq was initiated he was warned to watch for signs and symptoms of urinary retention and his HCTZ was d/c'd he follow up today to state his nocturia is much better however he is still having to get up 3 times a night which is an improvement we will increase his myrbetriq and send in script for the medication continue Flomax and if we get his urination controlled add back his HCTZ for pedal edema       Past Medical History  Disease Name Date Onset Notes   Degenerative disc disease, lumbar 03/16/2016 --    Edema Lower Extremity --  --    Essential hypertension 03/16/2016 --    GERD --  --    High cholesterol 02/05/2014 --    Hydrocele --  --    Screening PSA (prostate specific antigen) 10/28/2016 --    Tinnitus, bilateral --  --    Weakness of left lower extremity 03/16/2016 --          Past  Surgical History  Procedure Name Date Notes   Colonoscopy 1/13/2017 --    Hydrocelectomy 2015 x2         Medication List  Name Date Started Instructions   atorvastatin 20 mg oral tablet 06/24/2020 TAKE 1 TABLET BY MOUTH ONCE DAILY AT BEDTIME   losartan 50 mg oral tablet 06/24/2020 TAKE 1 TABLET BY MOUTH ONCE DAILY   Myrbetriq 50 mg oral tablet extended release 24 hr 07/21/2020 take 1 tablet (50 mg) by oral route once daily swallowing whole with water. Do not crush, chew and/or divide. for 30 days   omeprazole 20 mg oral capsule,delayed release(DR/EC)  take 1 capsule (20 mg) by oral route once daily before a meal   tamsulosin 0.4 mg oral capsule 06/24/2020 2 po [facility]         Allergy List  Allergen Name Date Reaction Notes   NO KNOWN DRUG ALLERGIES --  --  --        Allergies Reconciled  Family Medical History  Disease Name Relative/Age Notes   Breast Neoplasm, Malignant Grandmother (maternal)/  Grandmother (paternal)/   --    Dementia, Senile Mother/   --    Stroke  Paternal grandparent   Heart Disease Father/   --    Diabetes, unspecified type Father/   --    Hypertension Father/  Grandmother (paternal)/  Mother/   --    Epilepsy (Seizures) Mother/   --          Social History  Finding Status Start/Stop Quantity Notes   Alcohol Light --/-- --  --     --  --/-- --  --    Minimal Amount of Exercise (Once weekly or less) --  --/-- --  --    No known infection risk --  --/-- --  --    Tobacco Former 20/50 socially Quit 2017         Immunizations  NameDate Admin Mfg Trade Name Lot Number Route Inj VIS Given VIS Publication   [id number]/18/2019 SEQ Not Entered [id number] [facility] 09/18/2019    Comments: [employer] administered vaccine         Review of Systems  · Constitutional  o Denies  o : fever, weight gain, weight loss, malaise/fatigue  · Cardiovascular  o Admits  o : pedal edema  o Denies  o : palpitation, chest pain, claudication,   · Respiratory  o Denies  o : shortness of breath, JOYNER, PND/Orthopnea, hemoptysis, dry  cough, productive cough  · Gastrointestinal  o Denies  o : nausea, vomiting, reflux, diarrhea, constipation, abdominal pain, blood in stools  · Genitourinary  o Admits  o : freq/urg/nocturia, BPH  o Denies  o : dysuria incontinence,  · Neurologic  o Denies  o : unsteady gait, weakness, dizziness, H/A  · Musculoskeletal  o Denies  o : myalgias, joint pain, joint swelling  · Endocrine  o Denies  o : heat intolerance, cold intolerance, polyuria, polydipsia      Physical Examination  · Constitutional  o Appearance  o : well-nourished, well developed, alert, in no acute distress  · Head and Face  o Head  o :   § Inspection  § : atraumatic, normocephalic  o Face  o :   § Inspection  § : no facial lesions  · Ears, Nose, Mouth and Throat  o Ears  o :   § External Ears  § : appearance within normal limits, no lesions present  o Nose  o :   § External Nose  § : normal stucture noted.  o Oral Cavity  o :   § Lips  § : lip appearance normal  § Teeth  § : normal dentition for age  · Neck  o Inspection/Palpation  o : normal appearance, no masses or tenderness, trachea midline  o Range of Motion  o : range of motion within normal limits  · Respiratory  o Respiratory Effort  o : breathing unlabored  · Skin and Subcutaneous Tissue  o General Inspection  o : no rashes or lesions present, no areas of discoloration  · Neurologic  o Mental Status Examination  o :   § Orientation  § : grossly oriented to person, place and time  o Cranial Nerves  o : cranial nerves intact and symmetric throughout  · Psychiatric  o Mood and Affect  o : mood normal, affect appropriate, denies any SI/HI          Assessment  · BPH (benign prostatic hyperplasia)     600.00/N40.0  · Essential hypertension     401.9/I10  · OAB (overactive bladder)     596.51/N32.81  · Pedal edema     782.3/R60.0      Plan  · Orders  o ACO-39: Current medications updated and reviewed () - - 07/21/2020  · Medications  o Myrbetriq 50 mg oral tablet extended release 24 hr    SIG: take 1 tablet (50 mg) by oral route once daily swallowing whole with water. Do not crush, chew and/or divide. for 30 days   DISP: (30) tablets with 11 refills  Adjusted on 07/21/2020     o Medications have been Reconciled  o Transition of Care or Provider Policy  · Instructions  o Handouts were given to patient:   o Patient is taking medications as prescribed and doing well.   o Take all medications as prescribed/directed.  o Patient was educated/instructed on their diagnosis, treatment and medications prior to discharge from the clinic today.  o Patient instructed to seek medical attention urgently for new or worsening symptoms.  o Call the office with any concerns or questions.  o Risks, benefits, and alternatives were discussed with the patient. The patient is aware of risks associated with: medications  o Chronic conditions reviewed and taken into consideration for today's treatment plan.  · Disposition  o Call or Return if symptoms worsen or persist.  o follow up 3 months  o follow up prn or as needed  o Care Transition            Electronically Signed by: FILIBERTO Hodges -Author on July 21, 2020 11:09:20 PM

## 2021-05-13 NOTE — PROGRESS NOTES
Progress Note      Patient Name: Eddie Casas   Patient ID: [id number]   Sex: Male   YOB: 1965    Primary Care Provider: Mike Shane DO   Referring Provider: Mike Shane DO    Visit Date: October 27, 2020    Provider: Mike Shane DO   [place] [facility]   Location Address: [address] [place] [address], [address]  [place], [place]  [id number]   Location Phone: [phone]          Chief Complaint  · 3 month f/u      History Of Present Illness  Eddie Casas is a 55 year old /White male who presents for evaluation and treatment of:      Pt is here for 3 month f/u.    Pt states that he has no concerns at this time. Patient states that he was up frequently during the night but is improved since only on monotherapy with Myrbetriq. Patient has stopped Flomax and symptoms have improved. Patient PSA was good.       Past Medical History  Disease Name Date Onset Notes   Degenerative disc disease, lumbar 03/16/2016 --    Edema Lower Extremity --  --    Essential hypertension 03/16/2016 --    GERD --  --    High cholesterol 02/05/2014 --    Hydrocele --  --    Screening PSA (prostate specific antigen) 10/28/2016 --    Tinnitus, bilateral --  --    Weakness of left lower extremity 03/16/2016 --          Past Surgical History  Procedure Name Date Notes   Colonoscopy 1/13/2017 --    Hydrocelectomy 2015 x2         Medication List  Name Date Started Instructions   atorvastatin 20 mg oral tablet 06/24/2020 TAKE 1 TABLET BY MOUTH ONCE DAILY AT BEDTIME   losartan 50 mg oral tablet 06/24/2020 TAKE 1 TABLET BY MOUTH ONCE DAILY   Myrbetriq 50 mg oral tablet extended release 24 hr 07/21/2020 take 1 tablet (50 mg) by oral route once daily swallowing whole with water. Do not crush, chew and/or divide. for 30 days   omeprazole 20 mg oral capsule,delayed release(DR/EC)  take 1 capsule (20 mg) by oral route once daily before a meal   tamsulosin 0.4 mg oral capsule 06/24/2020 2 po qhs          Allergy List  Allergen Name Date Reaction Notes   NO KNOWN DRUG ALLERGIES --  --  --        Allergies Reconciled  Family Medical History  Disease Name Relative/Age Notes   Breast Neoplasm, Malignant Grandmother (maternal)/  Grandmother (paternal)/   --    Dementia, Senile Mother/   --    Stroke  Paternal grandparent   Heart Disease Father/   --    Diabetes, unspecified type Father/   --    Hypertension Father/  Grandmother (paternal)/  Mother/   --    Epilepsy (Seizures) Mother/   --          Social History  Finding Status Start/Stop Quantity Notes   Alcohol Light --/-- --  --     --  --/-- --  --    Minimal Amount of Exercise (Once weekly or less) --  --/-- --  --    No known infection risk --  --/-- --  --    Tobacco Former 20/50 socially Quit 2017         Immunizations  NameDate Admin Mfg Trade Name Lot Number Route Inj VIS Given VIS Publication   [id number]/18/2019 SEQ Not Entered [id number] RA 09/18/2019    Comments: [employer] administered vaccine         Review of Systems  · Constitutional  o Denies  o : fatigue, night sweats  · Eyes  o Denies  o : double vision, blurred vision  · HENT  o Denies  o : vertigo, recent head injury  · Cardiovascular  o Denies  o : chest pain, irregular heart beats  · Respiratory  o Denies  o : shortness of breath, productive cough  · Gastrointestinal  o Denies  o : nausea, vomiting  · Genitourinary  o Denies  o : dysuria, urinary retention  · Integument  o Denies  o : hair growth change, new skin lesions  · Neurologic  o Denies  o : altered mental status, seizures  · Musculoskeletal  o Denies  o : joint swelling, limitation of motion  · Endocrine  o Denies  o : cold intolerance, heat intolerance  · Psychiatric  o Denies  o : anxiety, depression  · Heme-Lymph  o Denies  o : petechiae, lymph node enlargement or tenderness  · Allergic-Immunologic  o Denies  o : frequent illnesses      Vitals  Date Time BP Position Site L\R Cuff Size HR RR TEMP (F) WT  HT  BMI kg/m2 BSA  "m2 O2 Sat FR L/min FiO2 HC       06/24/2020 04:22 /86 Sitting    90 - R   224lbs 2oz 5'  10\" 32.16 2.24 100 %      10/27/2020 02:28 /73 Sitting    95 - R  98.8 224lbs 8oz 5'  10\" 32.21 2.24 98 %  21%          Physical Examination  · Constitutional  o Appearance  o : alert, oriented, in no acute distress, well developed, well-nourished  · Eyes  o Vision  o : Conjuntivae: Normal, Sclerae white, Pupils: PERRL, Cornea: Clear, no lesions bilateral  · Ears, Nose, Mouth and Throat  o Ears  o : Ext. Ears: Normal shape, Non tender, EACs: Normal , Tragus intact bilaterally, Hearing: intact to conversational voice bilaterally  o Nose  o : No nasal discharge, Mucosa: normal, Septum: midline, Sinuses: Nontender  o Throat  o : Oropharynx: no inflmation or lesions, no purulence or drainage  · Neck  o Inspection/Palpation  o : Supple, no masses or tenderness, no deformities, Trachea: Midline, ROM: with in normal limits, no neck stiffness, no lymphadenopathy  o Thyroid  o : no thyomegaly, no palpabale masses   · Respiratory  o Auscultation of Lungs  o : normal breath sounds throughout, no wheeze, rhonchi, or crackles  · Cardiovascular  o Heart  o : Regular rate and rhythm, Normal S1,S2 , No cardiac murmers, No S3 or S4 gallop or rubs  · Gastrointestinal  o Abdominal Examination  o : abdomen soft, nontender, non distended, no rigidity, gaurding, rebound tenderness, no ventral hernias present  o Liver and spleen  o : no hepatomegaly present, liver nontender to palpation, spleen not palpable  · Skin and Subcutaneous Tissue  o General Inspection  o : no rashes on visible skin, normal skin color, warm and dry  o Digits and Nails  o : no clubbing, cyanosis, deformities or edema present, normal appearing nails  · Neurologic  o Mental Status Examination  o : alert and oriented to time, place, and person. Gait and Station: normal gait, able to stand without difficulty. CN 2-12 grossly intact   · Psychiatric  o Judgment and " Insight  o : judgment and insight intact  o Mood and Affect  o : normal mood and affect          Assessment  · Essential hypertension     401.9/I10  · Hyperlipidemia     272.4/E78.5  · Monocytosis     288.63/D72.821  Looks to be chronic looking at CBC from 2016 compared to this year, nevertheless we will recheck just to ensure stable.  · Venous insufficiency     459.81/I87.2  History of surgeries to help improve in LLE but still with some issues but avoiding diuretics at this time.  · Overactive bladder     596.51/N32.81      Plan  · Orders  o ACO - Advance Care Plan or Surrogate Decision Maker documented in EMR (1123F) - - 10/27/2020  o CBC with Auto Diff [facility] (77812) - 288.63/D72.821 - 10/27/2020  o ACO-39: Current medications updated and reviewed (1159F, ) - - 10/27/2020  o ACO-14: Influenza immunization administered or previously received [facility] () - - 10/27/2020  · Medications  o atorvastatin 20 mg oral tablet   SIG: TAKE 1 TABLET BY MOUTH ONCE DAILY AT BEDTIME   DISP: (90) Tablet with 1 refills  Adjusted on 10/27/2020     o losartan 50 mg oral tablet   SIG: TAKE 1 TABLET BY MOUTH ONCE DAILY   DISP: (90) Each with 1 refills  Adjusted on 10/27/2020     o Myrbetriq 50 mg oral tablet extended release 24 hr   SIG: take 1 tablet (50 mg) by oral route once daily swallowing whole with water. Do not crush, chew and/or divide. for 90 days   DISP: (90) Tablet with 1 refills  Adjusted on 10/27/2020     o tamsulosin 0.4 mg oral capsule   SI po qhs   DISP: (180) capsules with 3 refills  Discontinued on 10/27/2020     o Medications have been Reconciled  o Transition of Care or Provider Policy  · Instructions  o Advised that cheeses and other sources of dairy fats, animal fats, fast food, and the extras (candy, pastries, pies, doughnuts and cookies) all contain LDL raising nutrients. Advised to increase fruits, vegetables, whole grains, and to monitor portion sizes.   o Patient was educated/instructed on their  diagnosis, treatment and medications prior to discharge from the clinic today.  o Patient instructed to seek medical attention urgently for new or worsening symptoms.  o Call the office with any concerns or questions.  o Minutes spent with patient including greater than 50% in Education/Counseling/Care Coordination.  o Time spent with the patient was minutes, more than 50% face to face.  o Chronic conditions reviewed and taken into consideration for today's treatment plan.  o Discussed Covid-19 precautions including, but not limited to, social distancing, avoid touching your face, and hand washing.   · Disposition  o Follow up in six months            Electronically Signed by: Mike Shane, DO -Author on October 27, 2020 02:59:48 PM

## 2021-05-14 VITALS
DIASTOLIC BLOOD PRESSURE: 83 MMHG | HEART RATE: 102 BPM | TEMPERATURE: 98.3 F | BODY MASS INDEX: 31.7 KG/M2 | OXYGEN SATURATION: 98 % | SYSTOLIC BLOOD PRESSURE: 146 MMHG | SYSTOLIC BLOOD PRESSURE: 172 MMHG | HEIGHT: 69 IN | DIASTOLIC BLOOD PRESSURE: 78 MMHG | WEIGHT: 214 LBS

## 2021-05-14 VITALS
TEMPERATURE: 98.8 F | OXYGEN SATURATION: 98 % | BODY MASS INDEX: 32.14 KG/M2 | HEART RATE: 95 BPM | WEIGHT: 224.5 LBS | HEIGHT: 70 IN | DIASTOLIC BLOOD PRESSURE: 73 MMHG | SYSTOLIC BLOOD PRESSURE: 131 MMHG

## 2021-05-15 VITALS
HEART RATE: 93 BPM | SYSTOLIC BLOOD PRESSURE: 154 MMHG | TEMPERATURE: 98.6 F | DIASTOLIC BLOOD PRESSURE: 92 MMHG | HEIGHT: 70 IN | OXYGEN SATURATION: 98 %

## 2021-05-15 VITALS
OXYGEN SATURATION: 100 % | WEIGHT: 224.12 LBS | HEART RATE: 90 BPM | DIASTOLIC BLOOD PRESSURE: 86 MMHG | BODY MASS INDEX: 32.09 KG/M2 | HEIGHT: 70 IN | SYSTOLIC BLOOD PRESSURE: 138 MMHG

## 2021-05-16 VITALS
HEART RATE: 77 BPM | BODY MASS INDEX: 27.49 KG/M2 | SYSTOLIC BLOOD PRESSURE: 151 MMHG | TEMPERATURE: 98.4 F | HEIGHT: 70 IN | DIASTOLIC BLOOD PRESSURE: 86 MMHG | WEIGHT: 192 LBS

## 2021-05-16 VITALS
SYSTOLIC BLOOD PRESSURE: 127 MMHG | BODY MASS INDEX: 29.2 KG/M2 | HEIGHT: 70 IN | DIASTOLIC BLOOD PRESSURE: 87 MMHG | HEART RATE: 74 BPM | WEIGHT: 204 LBS | TEMPERATURE: 98.8 F | OXYGEN SATURATION: 97 % | RESPIRATION RATE: 15 BRPM

## 2021-05-16 VITALS
WEIGHT: 200.12 LBS | BODY MASS INDEX: 28.65 KG/M2 | SYSTOLIC BLOOD PRESSURE: 137 MMHG | DIASTOLIC BLOOD PRESSURE: 89 MMHG | HEART RATE: 74 BPM | HEIGHT: 70 IN

## 2021-05-16 VITALS
HEART RATE: 81 BPM | RESPIRATION RATE: 16 BRPM | SYSTOLIC BLOOD PRESSURE: 160 MMHG | OXYGEN SATURATION: 98 % | BODY MASS INDEX: 28.06 KG/M2 | DIASTOLIC BLOOD PRESSURE: 96 MMHG | WEIGHT: 196 LBS | HEIGHT: 70 IN | TEMPERATURE: 98.2 F

## 2021-06-08 RX ORDER — SODIUM PICOSULFATE, MAGNESIUM OXIDE, AND ANHYDROUS CITRIC ACID 10; 3.5; 12 MG/160ML; G/160ML; G/160ML
160 LIQUID ORAL TAKE AS DIRECTED
Qty: 320 ML | Refills: 0 | Status: SHIPPED | OUTPATIENT
Start: 2021-06-08 | End: 2021-06-09 | Stop reason: SDUPTHER

## 2021-06-09 RX ORDER — SODIUM PICOSULFATE, MAGNESIUM OXIDE, AND ANHYDROUS CITRIC ACID 10; 3.5; 12 MG/160ML; G/160ML; G/160ML
160 LIQUID ORAL TAKE AS DIRECTED
Qty: 320 ML | Refills: 0 | Status: SHIPPED | OUTPATIENT
Start: 2021-06-09 | End: 2021-06-11

## 2021-06-09 NOTE — TELEPHONE ENCOUNTER
Patient called stating the left side of his face is still numb and is the same. It is not getting worse but he is noticing it more. It is as if he's just gotten a shot of novocain. He can feel when he is touching but it is numb to touch to the left of skin flap & near his left eye. He is stating it is persistent numbness. When he is sitting & not doing anything he seems to notice the numbness more. He wants reassurance & to be encouraged that this numbness will go away & that there is no nerve damage.     His questions for Dr. Cole are:     1) Is it possible some nerves were nicked or there was some nerve damage caused since it has been 1 month since surgery?    2) Why is he still having this numbness when it's been 1 month after having the surgery?   Please send in [email or web address]

## 2021-06-15 RX ORDER — ATORVASTATIN CALCIUM 20 MG/1
20 TABLET, FILM COATED ORAL DAILY
COMMUNITY
End: 2021-06-15 | Stop reason: SDUPTHER

## 2021-06-15 RX ORDER — ATORVASTATIN CALCIUM 20 MG/1
20 TABLET, FILM COATED ORAL DAILY
Qty: 30 TABLET | Refills: 3 | Status: SHIPPED | OUTPATIENT
Start: 2021-06-15 | End: 2021-08-19 | Stop reason: SDUPTHER

## 2021-06-22 RX ORDER — TAMSULOSIN HYDROCHLORIDE 0.4 MG/1
1 CAPSULE ORAL DAILY
COMMUNITY
End: 2021-08-02 | Stop reason: SDUPTHER

## 2021-06-22 RX ORDER — OMEPRAZOLE 20 MG/1
20 CAPSULE, DELAYED RELEASE ORAL DAILY
COMMUNITY
End: 2022-01-12 | Stop reason: SDUPTHER

## 2021-06-22 RX ORDER — LOSARTAN POTASSIUM 50 MG/1
50 TABLET ORAL DAILY
COMMUNITY
End: 2021-07-13 | Stop reason: SDUPTHER

## 2021-06-22 NOTE — PRE-PROCEDURE INSTRUCTIONS
Pt. Instructed on laxative and skin prep, pre-op meds, clear liquid diet- ok to take all meds except Losartan a.m. of procedure

## 2021-06-28 ENCOUNTER — ANESTHESIA EVENT (OUTPATIENT)
Dept: GASTROENTEROLOGY | Facility: HOSPITAL | Age: 56
End: 2021-06-28

## 2021-06-28 ENCOUNTER — ANESTHESIA (OUTPATIENT)
Dept: GASTROENTEROLOGY | Facility: HOSPITAL | Age: 56
End: 2021-06-28

## 2021-06-28 ENCOUNTER — HOSPITAL ENCOUNTER (OUTPATIENT)
Facility: HOSPITAL | Age: 56
Setting detail: HOSPITAL OUTPATIENT SURGERY
Discharge: HOME | End: 2021-06-28
Attending: INTERNAL MEDICINE | Admitting: INTERNAL MEDICINE

## 2021-06-28 VITALS
SYSTOLIC BLOOD PRESSURE: 169 MMHG | TEMPERATURE: 96.9 F | RESPIRATION RATE: 13 BRPM | HEART RATE: 79 BPM | WEIGHT: 216.49 LBS | BODY MASS INDEX: 32.81 KG/M2 | DIASTOLIC BLOOD PRESSURE: 108 MMHG | HEIGHT: 68 IN | OXYGEN SATURATION: 97 %

## 2021-06-28 DIAGNOSIS — Z12.11 COLON CANCER SCREENING: ICD-10-CM

## 2021-06-28 PROCEDURE — 45385 COLONOSCOPY W/LESION REMOVAL: CPT | Performed by: INTERNAL MEDICINE

## 2021-06-28 PROCEDURE — 25010000002 PROPOFOL 10 MG/ML EMULSION: Performed by: NURSE ANESTHETIST, CERTIFIED REGISTERED

## 2021-06-28 PROCEDURE — 88305 TISSUE EXAM BY PATHOLOGIST: CPT | Performed by: INTERNAL MEDICINE

## 2021-06-28 RX ORDER — SODIUM CHLORIDE 0.9 % (FLUSH) 0.9 %
10 SYRINGE (ML) INJECTION AS NEEDED
Status: DISCONTINUED | OUTPATIENT
Start: 2021-06-28 | End: 2021-06-28 | Stop reason: HOSPADM

## 2021-06-28 RX ORDER — SODIUM CHLORIDE 0.9 % (FLUSH) 0.9 %
3 SYRINGE (ML) INJECTION EVERY 12 HOURS SCHEDULED
Status: DISCONTINUED | OUTPATIENT
Start: 2021-06-28 | End: 2021-06-28 | Stop reason: HOSPADM

## 2021-06-28 RX ORDER — PROPOFOL 10 MG/ML
VIAL (ML) INTRAVENOUS AS NEEDED
Status: DISCONTINUED | OUTPATIENT
Start: 2021-06-28 | End: 2021-06-28 | Stop reason: SURG

## 2021-06-28 RX ORDER — SODIUM CHLORIDE, SODIUM LACTATE, POTASSIUM CHLORIDE, CALCIUM CHLORIDE 600; 310; 30; 20 MG/100ML; MG/100ML; MG/100ML; MG/100ML
30 INJECTION, SOLUTION INTRAVENOUS CONTINUOUS
Status: DISCONTINUED | OUTPATIENT
Start: 2021-06-28 | End: 2021-06-28 | Stop reason: HOSPADM

## 2021-06-28 RX ORDER — LIDOCAINE HYDROCHLORIDE 20 MG/ML
INJECTION, SOLUTION INFILTRATION; PERINEURAL AS NEEDED
Status: DISCONTINUED | OUTPATIENT
Start: 2021-06-28 | End: 2021-06-28 | Stop reason: SURG

## 2021-06-28 RX ADMIN — SODIUM CHLORIDE, POTASSIUM CHLORIDE, SODIUM LACTATE AND CALCIUM CHLORIDE 30 ML/HR: 600; 310; 30; 20 INJECTION, SOLUTION INTRAVENOUS at 12:59

## 2021-06-28 RX ADMIN — PROPOFOL 100 MG: 10 INJECTION, EMULSION INTRAVENOUS at 13:44

## 2021-06-28 RX ADMIN — PROPOFOL 100 MG: 10 INJECTION, EMULSION INTRAVENOUS at 13:37

## 2021-06-28 RX ADMIN — PROPOFOL 50 MG: 10 INJECTION, EMULSION INTRAVENOUS at 13:33

## 2021-06-28 RX ADMIN — PROPOFOL 100 MG: 10 INJECTION, EMULSION INTRAVENOUS at 13:50

## 2021-06-28 RX ADMIN — PROPOFOL 100 MG: 10 INJECTION, EMULSION INTRAVENOUS at 13:30

## 2021-06-28 RX ADMIN — PROPOFOL 100 MG: 10 INJECTION, EMULSION INTRAVENOUS at 13:58

## 2021-06-28 RX ADMIN — LIDOCAINE HYDROCHLORIDE 80 MG: 20 INJECTION, SOLUTION INFILTRATION; PERINEURAL at 13:30

## 2021-06-28 NOTE — ANESTHESIA PREPROCEDURE EVALUATION
Anesthesia Evaluation     Patient summary reviewed and Nursing notes reviewed   no history of anesthetic complications:  NPO Solid Status: > 8 hours  NPO Liquid Status: > 2 hours           Airway   Mallampati: II  TM distance: >3 FB  Neck ROM: full  No difficulty expected  Dental      Pulmonary - negative pulmonary ROS and normal exam    breath sounds clear to auscultation  Cardiovascular - negative cardio ROS and normal exam  Exercise tolerance: good (4-7 METS)    Rhythm: regular        Neuro/Psych- negative ROS  GI/Hepatic/Renal/Endo - negative ROS     Musculoskeletal (-) negative ROS    Abdominal    Substance History - negative use     OB/GYN negative ob/gyn ROS         Other - negative ROS                       Anesthesia Plan    ASA 1     general   total IV anesthesia(Patient understands anesthesia not responsible for dental damage.)  intravenous induction     Anesthetic plan, all risks, benefits, and alternatives have been provided, discussed and informed consent has been obtained with: patient.  Use of blood products discussed with patient .   Plan discussed with CRNA.

## 2021-06-28 NOTE — H&P
"Pre Procedure History & Physical    Chief Complaint:   Screening colonoscopy    Subjective     HPI:   56 yo M here for screening colonoscopy.    Past Medical History:   Past Medical History:   Diagnosis Date   • Degenerative disc disease, lumbar 03/16/2016   • Edema, lower extremity    • Essential hypertension 03/16/2016   • GERD (gastroesophageal reflux disease)    • High cholesterol 02/05/2014   • Hydrocele    • Screening PSA (prostate specific antigen) 10/28/2016   • Tinnitus, bilateral    • Weakness of left lower extremity 03/16/2016       Past Surgical History:  Past Surgical History:   Procedure Laterality Date   • COLONOSCOPY  01/13/2017   • HYDROCELECTOMY  2015    x2       Family History:  Family History   Problem Relation Age of Onset   • Dementia Mother    • Epilepsy Mother    • Hypertension Mother    • Heart disease Father    • Diabetes Father    • Hypertension Father    • Breast cancer Maternal Grandmother    • Breast cancer Paternal Grandmother    • Hypertension Paternal Grandmother    • Stroke Other         Paternal grandparent   • Malig Hyperthermia Neg Hx        Social History:   reports that he quit smoking about 4 years ago. He does not have any smokeless tobacco history on file. He reports current alcohol use. He reports that he does not use drugs.    Medications:   Medications Prior to Admission   Medication Sig Dispense Refill Last Dose   • atorvastatin (LIPITOR) 20 MG tablet Take 1 tablet by mouth Daily. 30 tablet 3    • losartan (COZAAR) 50 MG tablet Take 50 mg by mouth Daily.      • Mirabegron ER (Myrbetriq) 50 MG tablet sustained-release 24 hour 24 hr tablet Take 50 mg by mouth Daily.      • omeprazole (priLOSEC) 20 MG capsule Take 20 mg by mouth Daily.      • tamsulosin (FLOMAX) 0.4 MG capsule 24 hr capsule Take 1 capsule by mouth Daily.          Allergies:  Patient has no known allergies.    ROS:    Pertinent items are noted in HPI     Objective     Height 172.7 cm (67.99\"), weight 98.2 kg " (216 lb 7.9 oz).    Physical Exam   Constitutional: Pt is oriented to person, place, and time and well-developed, well-nourished, and in no distress.   Mouth/Throat: Oropharynx is clear and moist.   Neck: Normal range of motion.   Cardiovascular: Normal rate, regular rhythm and normal heart sounds.    Pulmonary/Chest: Effort normal and breath sounds normal.   Abdominal: Soft. Nontender  Skin: Skin is warm and dry.   Psychiatric: Mood, memory, affect and judgment normal.     Assessment/Plan     Diagnosis:  Screening    Anticipated Surgical Procedure:  colonoscopy    The risks, benefits, and alternatives of this procedure have been discussed with the patient or the responsible party- the patient understands and agrees to proceed.

## 2021-06-29 LAB
CYTO UR: NORMAL
LAB AP CASE REPORT: NORMAL
LAB AP CLINICAL INFORMATION: NORMAL
PATH REPORT.FINAL DX SPEC: NORMAL
PATH REPORT.GROSS SPEC: NORMAL

## 2021-06-29 NOTE — ANESTHESIA POSTPROCEDURE EVALUATION
Patient: Jose Luis Casas    Procedure Summary     Date: 06/28/21 Room / Location: [facility] END[facility]COPY 1 / [facility]    Anesthesia Start: 1329 Anesthesia Stop: 1402    Procedure: COLONOSCOPY (N/A ) Diagnosis: (SCREENING)    Surgeons: Antonette Browning MD Provider: Jun Castanon MD    Anesthesia Type: general ASA Status: 1          Anesthesia Type: general    Vitals  Vitals Value Taken Time   /108 06/28/21 1423   Temp 36.1 °C (96.9 °F) 06/28/21 1419   Pulse 73 06/28/21 1423   Resp 13 06/28/21 1419   SpO2 99 % 06/28/21 1423   Vitals shown include unvalidated device data.        Post Anesthesia Care and Evaluation    Patient location during evaluation: bedside  Patient participation: complete - patient participated  Level of consciousness: confused  Pain score: 0  Pain management: adequate  Airway patency: patent  Anesthetic complications: No anesthetic complications  PONV Status: none  Cardiovascular status: acceptable and stable  Respiratory status: acceptable and room air  Hydration status: acceptable

## 2021-07-01 ENCOUNTER — TELEPHONE (OUTPATIENT)
Dept: GASTROENTEROLOGY | Facility: CLINIC | Age: 56
End: 2021-07-01

## 2021-07-01 NOTE — TELEPHONE ENCOUNTER
----- Message from Antonette Browning MD sent at 6/30/2021  3:33 PM EDT -----  Recall colonoscopy in 3 years.  Please mail results to patient and PCP.

## 2021-07-07 ENCOUNTER — OFFICE VISIT (OUTPATIENT)
Dept: INTERNAL MEDICINE | Facility: CLINIC | Age: 56
End: 2021-07-07

## 2021-07-07 VITALS
SYSTOLIC BLOOD PRESSURE: 147 MMHG | HEIGHT: 66 IN | TEMPERATURE: 97.5 F | WEIGHT: 217 LBS | HEART RATE: 72 BPM | DIASTOLIC BLOOD PRESSURE: 93 MMHG | OXYGEN SATURATION: 98 % | BODY MASS INDEX: 34.87 KG/M2

## 2021-07-07 DIAGNOSIS — E87.1 HYPONATREMIA: ICD-10-CM

## 2021-07-07 DIAGNOSIS — E78.5 HYPERLIPIDEMIA, UNSPECIFIED HYPERLIPIDEMIA TYPE: ICD-10-CM

## 2021-07-07 DIAGNOSIS — K44.9 HIATAL HERNIA: ICD-10-CM

## 2021-07-07 DIAGNOSIS — R00.2 PALPITATION: ICD-10-CM

## 2021-07-07 DIAGNOSIS — E66.09 CLASS 1 OBESITY DUE TO EXCESS CALORIES WITHOUT SERIOUS COMORBIDITY WITH BODY MASS INDEX (BMI) OF 32.0 TO 32.9 IN ADULT: ICD-10-CM

## 2021-07-07 DIAGNOSIS — R53.83 FATIGUE, UNSPECIFIED TYPE: ICD-10-CM

## 2021-07-07 DIAGNOSIS — I10 ESSENTIAL HYPERTENSION: Primary | ICD-10-CM

## 2021-07-07 PROBLEM — E66.811 CLASS 1 OBESITY DUE TO EXCESS CALORIES WITHOUT SERIOUS COMORBIDITY WITH BODY MASS INDEX (BMI) OF 32.0 TO 32.9 IN ADULT: Status: ACTIVE | Noted: 2021-07-07

## 2021-07-07 PROCEDURE — 99214 OFFICE O/P EST MOD 30 MIN: CPT | Performed by: STUDENT IN AN ORGANIZED HEALTH CARE EDUCATION/TRAINING PROGRAM

## 2021-07-07 RX ORDER — AMLODIPINE BESYLATE 10 MG/1
10 TABLET ORAL DAILY
Qty: 30 TABLET | Refills: 6 | Status: SHIPPED | OUTPATIENT
Start: 2021-07-07 | End: 2021-10-07

## 2021-07-07 NOTE — PROGRESS NOTES
"Chief Complaint  Hypertension (3 MONTH FOLLOW UP ), Urinary Frequency (3 MONTH FOLLOW UP ), Colonoscopy (3 MONTH FOLLOW UP POST PROCEDURE ), and Palpitations (NEW SYMPTOM/RAPID POUNDING HEARTBEAT )    Subjective          Jose Luis Casas presents to [facility]  History of Present Illness      6/28/2021 colonoscopy in interim.  Plan to repeat in 3 years  Report notes the following:   hemorrhoids  3 polyps ascending colon  2 polyps descending colon  5 mm polyp rectum  diverticulae, sigmoid colon    HTN:    Checking BP daily, but lost his log.  Running 160-170s over 80s    Palpitations:    Present for several months.  Stronger and more frequent now.  Daily.  Lasts minutes.  No discernible triggers.  Self resolves.    Objective   Vital Signs:   /93 (BP Location: Right arm, Patient Position: Sitting)   Pulse 72   Temp 97.5 °F (36.4 °C) (Temporal)   Ht 167.6 cm (66\")   Wt 98.4 kg (217 lb)   SpO2 98%   BMI 35.02 kg/m²     Physical Exam  Constitutional:       General: He is not in acute distress.     Appearance: Normal appearance. He is obese. He is not toxic-appearing.   HENT:      Head: Normocephalic and atraumatic.   Eyes:      Extraocular Movements: Extraocular movements intact.      Conjunctiva/sclera: Conjunctivae normal.      Pupils: Pupils are equal, round, and reactive to light.   Cardiovascular:      Rate and Rhythm: Normal rate and regular rhythm.      Heart sounds: Normal heart sounds. No murmur heard.   No friction rub. No gallop.    Pulmonary:      Effort: Pulmonary effort is normal.      Breath sounds: Normal breath sounds.   Abdominal:      General: Abdomen is flat.      Palpations: Abdomen is soft.   Musculoskeletal:         General: No swelling.   Skin:     General: Skin is warm and dry.   Neurological:      General: No focal deficit present.      Mental Status: He is alert. Mental status is at baseline.      Deep Tendon Reflexes: Reflexes normal. "   Psychiatric:         Mood and Affect: Mood normal.         Behavior: Behavior normal.         Thought Content: Thought content normal.         Judgment: Judgment normal.        [unfilled]  Result Review :   The following data was reviewed by: Sid Arango MD on 07/07/2021:  Common labs    Common Labsle 10/27/20 3/23/21 3/23/21 4/2/21     1128 1128    Glucose   92 124 (A)   BUN   8 10   Creatinine   0.85 0.92   Sodium   133 (A) 133 (A)   Potassium   4.5 4.7   Chloride   97 (A) 96 (A)   Calcium   9.4 9.8   Albumin   4.5 4.7   Total Bilirubin   0.52 0.36   Alkaline Phosphatase   59 62   AST (SGOT)   36 23   ALT (SGPT)   36 21   WBC 7.56 8.08  7.97   Hemoglobin 14.7 14.8  15.1   Hematocrit 43.1 43.8  45.1   Platelets 307 265  289   Total Cholesterol    186   Triglycerides    138   HDL Cholesterol    56   LDL Cholesterol     102 (A)   (A) Abnormal value       Comments are available for some flowsheets but are not being displayed.              Procedures      Assessment and Plan    Diagnoses and all orders for this visit:    1. Essential hypertension (Primary)  Assessment & Plan:  -home measurements above goal of < 130/80  -will add amlodipine to regimen        2. Hyperlipidemia, unspecified hyperlipidemia type    3. Fatigue, unspecified type    4. Hiatal hernia    5. Class 1 obesity due to excess calories without serious comorbidity with body mass index (BMI) of 32.0 to 32.9 in adult    6. Hyponatremia    7. Palpitation  -     Holter Monitor - 48 Hour; Future    Other orders  -     amLODIPine (NORVASC) 10 MG tablet; Take 1 tablet by mouth Daily.  Dispense: 30 tablet; Refill: 6      Follow Up   Return in about 3 months (around 10/7/2021) for Hypertension.  Patient was given instructions and counseling regarding his condition or for health maintenance advice. Please see specific information pulled into the AVS if appropriate.

## 2021-07-12 ENCOUNTER — HOSPITAL ENCOUNTER (OUTPATIENT)
Dept: CARDIOLOGY | Facility: HOSPITAL | Age: 56
Discharge: HOME | End: 2021-07-12
Admitting: STUDENT IN AN ORGANIZED HEALTH CARE EDUCATION/TRAINING PROGRAM

## 2021-07-12 DIAGNOSIS — R00.2 PALPITATION: ICD-10-CM

## 2021-07-12 PROCEDURE — 93225 XTRNL ECG REC<48 HRS REC: CPT

## 2021-07-13 DIAGNOSIS — I10 ESSENTIAL HYPERTENSION: Primary | ICD-10-CM

## 2021-07-13 RX ORDER — LOSARTAN POTASSIUM 50 MG/1
50 TABLET ORAL DAILY
Qty: 90 TABLET | Refills: 3 | Status: SHIPPED | OUTPATIENT
Start: 2021-07-13 | End: 2021-08-18

## 2021-07-13 RX ORDER — LOSARTAN POTASSIUM 50 MG/1
TABLET ORAL
Qty: 90 TABLET | Refills: 0 | OUTPATIENT
Start: 2021-07-13

## 2021-07-17 LAB
MAXIMAL PREDICTED HEART RATE: 166 BPM
STRESS TARGET HR: 141 BPM

## 2021-07-19 NOTE — PROGRESS NOTES
Mr. Casas,    Your holter monitor results overall were very reassuring.  It demonstrated rare premature atrial contractions and ventricular contractions (which is not unusual).  There was no evidence of elmo dysunfction, heart block or other concerning findings.    Sid Mcknight MD

## 2021-08-03 RX ORDER — TAMSULOSIN HYDROCHLORIDE 0.4 MG/1
1 CAPSULE ORAL DAILY
Qty: 30 CAPSULE | Refills: 2 | Status: SHIPPED | OUTPATIENT
Start: 2021-08-03 | End: 2021-10-07 | Stop reason: SDUPTHER

## 2021-08-18 RX ORDER — LOSARTAN POTASSIUM 50 MG/1
TABLET ORAL
Qty: 90 TABLET | Refills: 3 | Status: SHIPPED | OUTPATIENT
Start: 2021-08-18 | End: 2021-11-11

## 2021-08-19 RX ORDER — ATORVASTATIN CALCIUM 20 MG/1
20 TABLET, FILM COATED ORAL DAILY
Qty: 90 TABLET | Refills: 3 | Status: SHIPPED | OUTPATIENT
Start: 2021-08-19 | End: 2021-10-07 | Stop reason: SDUPTHER

## 2021-10-07 ENCOUNTER — OFFICE VISIT (OUTPATIENT)
Dept: INTERNAL MEDICINE | Facility: CLINIC | Age: 56
End: 2021-10-07

## 2021-10-07 VITALS
WEIGHT: 225 LBS | TEMPERATURE: 97.3 F | DIASTOLIC BLOOD PRESSURE: 97 MMHG | SYSTOLIC BLOOD PRESSURE: 163 MMHG | HEART RATE: 86 BPM | HEIGHT: 69 IN | OXYGEN SATURATION: 98 % | BODY MASS INDEX: 33.33 KG/M2

## 2021-10-07 DIAGNOSIS — I10 ESSENTIAL HYPERTENSION: Primary | ICD-10-CM

## 2021-10-07 DIAGNOSIS — Z23 ENCOUNTER FOR IMMUNIZATION: ICD-10-CM

## 2021-10-07 DIAGNOSIS — M54.50 CHRONIC BILATERAL LOW BACK PAIN WITHOUT SCIATICA: ICD-10-CM

## 2021-10-07 DIAGNOSIS — E87.1 HYPONATREMIA: ICD-10-CM

## 2021-10-07 DIAGNOSIS — G89.29 CHRONIC BILATERAL LOW BACK PAIN WITHOUT SCIATICA: ICD-10-CM

## 2021-10-07 DIAGNOSIS — E66.09 CLASS 1 OBESITY DUE TO EXCESS CALORIES WITHOUT SERIOUS COMORBIDITY WITH BODY MASS INDEX (BMI) OF 32.0 TO 32.9 IN ADULT: ICD-10-CM

## 2021-10-07 DIAGNOSIS — E78.5 HYPERLIPIDEMIA, UNSPECIFIED HYPERLIPIDEMIA TYPE: ICD-10-CM

## 2021-10-07 PROCEDURE — 90686 IIV4 VACC NO PRSV 0.5 ML IM: CPT | Performed by: STUDENT IN AN ORGANIZED HEALTH CARE EDUCATION/TRAINING PROGRAM

## 2021-10-07 PROCEDURE — 99214 OFFICE O/P EST MOD 30 MIN: CPT | Performed by: STUDENT IN AN ORGANIZED HEALTH CARE EDUCATION/TRAINING PROGRAM

## 2021-10-07 PROCEDURE — 90471 IMMUNIZATION ADMIN: CPT | Performed by: STUDENT IN AN ORGANIZED HEALTH CARE EDUCATION/TRAINING PROGRAM

## 2021-10-07 RX ORDER — HYDROCHLOROTHIAZIDE 25 MG/1
25 TABLET ORAL DAILY
Qty: 30 TABLET | Refills: 2 | Status: SHIPPED | OUTPATIENT
Start: 2021-10-07 | End: 2021-11-10

## 2021-10-07 RX ORDER — ATORVASTATIN CALCIUM 20 MG/1
20 TABLET, FILM COATED ORAL DAILY
Qty: 90 TABLET | Refills: 3 | Status: SHIPPED | OUTPATIENT
Start: 2021-10-07 | End: 2022-01-12 | Stop reason: SDUPTHER

## 2021-10-07 RX ORDER — HYDROCHLOROTHIAZIDE 25 MG/1
25 TABLET ORAL DAILY
Qty: 30 TABLET | Refills: 2 | Status: SHIPPED | OUTPATIENT
Start: 2021-10-07 | End: 2021-10-07

## 2021-10-07 RX ORDER — TAMSULOSIN HYDROCHLORIDE 0.4 MG/1
1 CAPSULE ORAL DAILY
Qty: 30 CAPSULE | Refills: 2 | Status: SHIPPED | OUTPATIENT
Start: 2021-10-07 | End: 2022-01-12 | Stop reason: SDUPTHER

## 2021-10-07 NOTE — PROGRESS NOTES
"Chief Complaint  Follow-up (b/p     needs refills)   HTN    Subjective          Jose Luis Casas presents to [facility]  History of Present Illness    Started PT last week for low back pain.  Has had sciatic symptoms in past, but not recently.      Stopped amlodipine due to worsening LE edema, but still taking losartan 50 mg    BP log today is above 130/80    Gained 8 lbs since July    He is exercising: walks 2 miles several times a week    6/28/2021 colonoscopy demonstrated hemorrhoids, diverticulosis and several polyps.  Plan to repeat in 3 years.    Objective   Vital Signs:   /97 (BP Location: Left arm, Patient Position: Sitting, Cuff Size: Adult)   Pulse 86   Temp 97.3 °F (36.3 °C)   Ht 175.3 cm (69\")   Wt 102 kg (225 lb)   SpO2 98%   BMI 33.23 kg/m²     Physical Exam  Constitutional:       General: He is not in acute distress.     Appearance: Normal appearance. He is obese. He is not toxic-appearing.   HENT:      Right Ear: External ear normal.      Left Ear: External ear normal.      Nose: Nose normal.   Eyes:      Conjunctiva/sclera: Conjunctivae normal.   Cardiovascular:      Rate and Rhythm: Normal rate and regular rhythm.      Pulses: Normal pulses.      Heart sounds: Normal heart sounds. No murmur heard.     Pulmonary:      Effort: Pulmonary effort is normal.      Breath sounds: Normal breath sounds. No wheezing.   Abdominal:      General: Abdomen is flat.      Palpations: Abdomen is soft.   Musculoskeletal:      Right lower leg: Edema present.      Left lower leg: Edema present.      Comments: 1+ LE edema bilaterally, with left somewhat bigger compared to right   Skin:     General: Skin is warm.   Neurological:      General: No focal deficit present.      Mental Status: He is alert. Mental status is at baseline.   Psychiatric:         Mood and Affect: Mood normal.         Behavior: Behavior normal.         Thought Content: Thought content normal.   "       Judgment: Judgment normal.        Result Review :                 Assessment and Plan    Diagnoses and all orders for this visit:    1. Essential hypertension (Primary)  -     Basic metabolic panel; Future    2. Hyperlipidemia, unspecified hyperlipidemia type    3. Class 1 obesity due to excess calories without serious comorbidity with body mass index (BMI) of 32.0 to 32.9 in adult    4. Chronic bilateral low back pain without sciatica    5. Hyponatremia  -     Basic metabolic panel; Future    6. Encounter for immunization    Other orders  -     tamsulosin (FLOMAX) 0.4 MG capsule 24 hr capsule; Take 1 capsule by mouth Daily.  Dispense: 30 capsule; Refill: 2  -     atorvastatin (LIPITOR) 20 MG tablet; Take 1 tablet by mouth Daily.  Dispense: 90 tablet; Refill: 3  -     Discontinue: hydroCHLOROthiazide (HYDRODIURIL) 25 MG tablet; Take 1 tablet by mouth Daily.  Dispense: 30 tablet; Refill: 2  -     hydroCHLOROthiazide (HYDRODIURIL) 25 MG tablet; Take 1 tablet by mouth Daily.  Dispense: 30 tablet; Refill: 2  -     FluLaval/Fluarix >6 Months (4770-6350)    LBP:  -in PT  -reviewed Jennifer lower back exercises, and provided with supplementary educational material    Obesity:  -nutritional and exercise counseling today    HTN:  -above goal of < 130/80  -will cautiously start HCTZ, repeat BMP in one month  -low sodium diet counseling today    Immunization:  -Discussed risks/benefits to vaccination, reviewed components of the vaccine, discussed VIS, discussed informed consent, informed consent obtained. Patient/Parent was allowed to accept or refuse vaccine. Questions answered to satisfactory state of patient/parent. We reviewed typical age appropriate and seasonally appropriate vaccinations. Reviewed immunization history and updated state vaccination form as needed. Patient/Parent was counseled on the above vaccines.      Follow Up   Return in about 3 months (around 1/7/2022) for MA visit in one month for BMP;  Physician visit in 3 months for HTN.  Patient was given instructions and counseling regarding his condition or for health maintenance advice. Please see specific information pulled into the AVS if appropriate.

## 2021-10-07 NOTE — PATIENT INSTRUCTIONS
Mediterranean Diet  A Mediterranean diet refers to food and lifestyle choices that are based on the traditions of countries located on the [place]. This way of eating has been shown to help prevent certain conditions and improve outcomes for people who have chronic diseases, like kidney disease and heart disease.  What are tips for following this plan?  Lifestyle  · Cook and eat meals together with your family, when possible.  · Drink enough fluid to keep your urine clear or pale yellow.  · Be physically active every day. This includes:  ? Aerobic exercise like running or swimming.  ? Leisure activities like gardening, walking, or housework.  · Get 7-8 hours of sleep each night.  · If recommended by your health care provider, drink red wine in moderation. This means 1 glass a day for nonpregnant women and 2 glasses a day for men. A glass of wine equals 5 oz (150 mL).  Reading food labels    · Check the serving size of packaged foods. For foods such as rice and pasta, the serving size refers to the amount of cooked product, not dry.  · Check the total fat in packaged foods. Avoid foods that have saturated fat or trans fats.  · Check the ingredients list for added sugars, such as corn syrup.    Shopping  · At the grocery store, buy most of your food from the areas near the walls of the store. This includes:  ? Fresh fruits and vegetables (produce).  ? Grains, beans, nuts, and seeds. Some of these may be available in unpackaged forms or large amounts (in bulk).  ? Fresh seafood.  ? Poultry and eggs.  ? Low-fat dairy products.  · Buy whole ingredients instead of prepackaged foods.  · Buy fresh fruits and vegetables in-season from local farmers markets.  · Buy frozen fruits and vegetables in resealable bags.  · If you do not have access to quality fresh seafood, buy precooked frozen shrimp or canned fish, such as tuna, salmon, or sardines.  · Buy small amounts of raw or cooked vegetables, salads, or olives from  the deli or salad bar at your store.  · Stock your pantry so you always have certain foods on hand, such as olive oil, canned tuna, canned tomatoes, rice, pasta, and beans.  Cooking  · Cook foods with extra-virgin olive oil instead of using butter or other vegetable oils.  · Have meat as a side dish, and have vegetables or grains as your main dish. This means having meat in small portions or adding small amounts of meat to foods like pasta or stew.  · Use beans or vegetables instead of meat in common dishes like chili or lasagna.  · [place]raymer with different cooking methods. Try roasting or broiling vegetables instead of steaming or sautéeing them.  · Add frozen vegetables to soups, stews, pasta, or rice.  · Add nuts or seeds for added healthy fat at each meal. You can add these to yogurt, salads, or vegetable dishes.  · Marinate fish or vegetables using olive oil, lemon juice, garlic, and fresh herbs.  Meal planning    · Plan to eat 1 vegetarian meal one day each week. Try to work up to 2 vegetarian meals, if possible.  · Eat seafood 2 or more times a week.  · Have healthy snacks readily available, such as:  ? Vegetable sticks with hummus.  ? Greek yogurt.  ? Fruit and nut trail mix.  · Eat balanced meals throughout the week. This includes:  ? Fruit: 2-3 servings a day  ? Vegetables: 4-5 servings a day  ? Low-fat dairy: 2 servings a day  ? Fish, poultry, or lean meat: 1 serving a day  ? Beans and legumes: 2 or more servings a week  ? Nuts and seeds: 1-2 servings a day  ? Whole grains: 6-8 servings a day  ? Extra-virgin olive oil: 3-4 servings a day  · Limit red meat and sweets to only a few servings a month    What are my food choices?  · Mediterranean diet  ? Recommended  § Grains: Whole-grain pasta. Brown rice. Bulgar wheat. Polenta. Couscous. Whole-wheat bread. Oatmeal. Quinoa.  § Vegetables: Artichokes. Beets. Broccoli. Cabbage. Carrots. Eggplant. Green beans. Chard. Kale. Spinach. Onions. Leeks. Peas. Squash.  Tomatoes. Peppers. Radishes.  § Fruits: Apples. Apricots. Avocado. Berries. Bananas. Cherries. Dates. Figs. Grapes. Malick. Melon. Oranges. Peaches. Plums. Pomegranate.  § Meats and other protein foods: Beans. Almonds. Sunflower seeds. Pine nuts. Peanuts. Cod. Brogue. Scallops. Shrimp. Tuna. Tilapia. Clams. Oysters. Eggs.  § Dairy: Low-fat milk. Cheese. Greek yogurt.  § Beverages: Water. Red wine. Herbal tea.  § Fats and oils: Extra virgin olive oil. Avocado oil. Grape seed oil.  § Sweets and desserts: Greek yogurt with honey. Baked apples. Poached pears. Trail mix.  § Seasoning and other foods: Basil. Cilantro. Coriander. Cumin. Mint. Parsley. Espinoza. Rosemary. Tarragon. Garlic. Oregano. Thyme. Pepper. Balsalmic vinegar. Tahini. Hummus. Tomato sauce. Olives. Mushrooms.  ? Limit these  § Grains: Prepackaged pasta or rice dishes. Prepackaged cereal with added sugar.  § Vegetables: Deep fried potatoes (french fries).  § Fruits: Fruit canned in syrup.  § Meats and other protein foods: Beef. Pork. Lamb. Poultry with skin. Hot dogs. Baugh.  § Dairy: Ice cream. Sour cream. Whole milk.  § Beverages: Juice. Sugar-sweetened soft drinks. Beer. Liquor and spirits.  § Fats and oils: Butter. Canola oil. Vegetable oil. Beef fat (tallow). Lard.  § Sweets and desserts: Cookies. Cakes. Pies. Candy.  § Seasoning and other foods: Mayonnaise. Premade sauces and marinades.  The items listed may not be a complete list. Talk with your dietitian about what dietary choices are right for you.  Summary  · The Mediterranean diet includes both food and lifestyle choices.  · Eat a variety of fresh fruits and vegetables, beans, nuts, seeds, and whole grains.  · Limit the amount of red meat and sweets that you eat.  · Talk with your health care provider about whether it is safe for you to drink red wine in moderation. This means 1 glass a day for nonpregnant women and 2 glasses a day for men. A glass of wine equals 5 oz (150 mL).  This information  is not intended to replace advice given to you by your health care provider. Make sure you discuss any questions you have with your health care provider.  Document Revised: 08/17/2017 Document Reviewed: 08/10/2017  [employer] Patient Education © 2020 [employer]    Exercising to Stay Healthy  To become healthy and stay healthy, it is recommended that you do moderate-intensity and vigorous-intensity exercise. You can tell that you are exercising at a moderate intensity if your heart starts beating faster and you start breathing faster but can still hold a conversation. You can tell that you are exercising at a vigorous intensity if you are breathing much harder and faster and cannot hold a conversation while exercising.  Exercising regularly is important. It has many health benefits, such as:  · Improving overall fitness, flexibility, and endurance.  · Increasing bone density.  · Helping with weight control.  · Decreasing body fat.  · Increasing muscle strength.  · Reducing stress and tension.  · Improving overall health.  How often should I exercise?  Choose an activity that you enjoy, and set realistic goals. Your health care provider can help you make an activity plan that works for you.  Exercise regularly as told by your health care provider. This may include:  · Doing strength training two times a week, such as:  ? Lifting weights.  ? Using resistance bands.  ? Push-ups.  ? Sit-ups.  ? Yoga.  · Doing a certain intensity of exercise for a given amount of time. Choose from these options:  ? A total of 150 minutes of moderate-intensity exercise every week.  ? A total of 75 minutes of vigorous-intensity exercise every week.  ? A mix of moderate-intensity and vigorous-intensity exercise every week.  Children, pregnant women, people who have not exercised regularly, people who are overweight, and older adults may need to talk with a health care provider about what activities are safe to do. If you have a medical  condition, be sure to talk with your health care provider before you start a new exercise program.  What are some exercise ideas?  Moderate-intensity exercise ideas include:  · Walking 1 mile (1.6 km) in about 15 minutes.  · Biking.  · Hiking.  · Golfing.  · Dancing.  · Water aerobics.  Vigorous-intensity exercise ideas include:  · Walking 4.5 miles (7.2 km) or more in about 1 hour.  · Jogging or running 5 miles (8 km) in about 1 hour.  · Biking 10 miles (16.1 km) or more in about 1 hour.  · Lap swimming.  · Roller-skating or in-line skating.  · Cross-country skiing.  · Vigorous competitive sports, such as football, basketball, and soccer.  · Jumping rope.  · Aerobic dancing.  What are some everyday activities that can help me to get exercise?  · Yard work, such as:  ? Pushing a .  ? Raking and bagging leaves.  · Washing your car.  · Pushing a stroller.  · Shoveling snow.  · Gardening.  · Washing windows or floors.  How can I be more active in my day-to-day activities?  · Use stairs instead of an elevator.  · Take a walk during your lunch break.  · If you drive, park your car farther away from your work or school.  · If you take public transportation, get off one stop early and walk the rest of the way.  · Stand up or walk around during all of your indoor phone calls.  · Get up, stretch, and walk around every 30 minutes throughout the day.  · Enjoy exercise with a friend. Support to continue exercising will help you keep a regular routine of activity.  What guidelines can I follow while exercising?  · Before you start a new exercise program, talk with your health care provider.  · Do not exercise so much that you hurt yourself, feel dizzy, or get very short of breath.  · Wear comfortable clothes and wear shoes with good support.  · Drink plenty of water while you exercise to prevent dehydration or heat stroke.  · Work out until your breathing and your heartbeat get faster.  Where to find more  information  · [place] Department of Health and Human Services: www.hhs.gov  · Centers for Disease Control and Prevention (CDC): www.cdc.gov  Summary  · Exercising regularly is important. It will improve your overall fitness, flexibility, and endurance.  · Regular exercise also will improve your overall health. It can help you control your weight, reduce stress, and improve your bone density.  · Do not exercise so much that you hurt yourself, feel dizzy, or get very short of breath.  · Before you start a new exercise program, talk with your health care provider.  This information is not intended to replace advice given to you by your health care provider. Make sure you discuss any questions you have with your health care provider.  Document Revised: 11/30/2018 Document Reviewed: 11/08/2018  [employer] Patient Education © 2021 [employer]  Influenza (Flu) Vaccine (Inactivated or Recombinant): What You Need to Know  1. Why get vaccinated?  Influenza vaccine can prevent influenza (flu).  Flu is a contagious disease that spreads around the [place] every year, usually between October and May. Anyone can get the flu, but it is more dangerous for some people. Infants and young children, people 65 years of age and older, pregnant women, and people with certain health conditions or a weakened immune system are at greatest risk of flu complications.  Pneumonia, bronchitis, sinus infections and ear infections are examples of flu-related complications. If you have a medical condition, such as heart disease, cancer or diabetes, flu can make it worse.  Flu can cause fever and chills, sore throat, muscle aches, fatigue, cough, headache, and runny or stuffy nose. Some people may have vomiting and diarrhea, though this is more common in children than adults.  Each year thousands of people in the [place] die from flu, and many more are hospitalized. Flu vaccine prevents millions of illnesses and flu-related visits to the  doctor each year.  2. Influenza vaccine  CDC recommends everyone 6 months of age and older get vaccinated every flu season. Children 6 months through 8 years of age may need 2 doses during a single flu season. Everyone else needs only 1 dose each flu season.  It takes about 2 weeks for protection to develop after vaccination.  There are many flu viruses, and they are always changing. Each year a new flu vaccine is made to protect against three or four viruses that are likely to cause disease in the upcoming flu season. Even when the vaccine doesn't exactly match these viruses, it may still provide some protection.  Influenza vaccine does not cause flu.  Influenza vaccine may be given at the same time as other vaccines.  3. Talk with your health care provider  Tell your vaccine provider if the person getting the vaccine:  · Has had an allergic reaction after a previous dose of influenza vaccine, or has any severe, life-threatening allergies.  · Has ever had Guillain-Barré Syndrome (also called GBS).  In some cases, your health care provider may decide to postpone influenza vaccination to a future visit.  People with minor illnesses, such as a cold, may be vaccinated. People who are moderately or severely ill should usually wait until they recover before getting influenza vaccine.  Your health care provider can give you more information.  4. Risks of a vaccine reaction  · Soreness, redness, and swelling where shot is given, fever, muscle aches, and headache can happen after influenza vaccine.  · There may be a very small increased risk of Guillain-Barré Syndrome (GBS) after inactivated influenza vaccine (the flu shot).  Young children who get the flu shot along with pneumococcal vaccine (PCV13), and/or DTaP vaccine at the same time might be slightly more likely to have a seizure caused by fever. Tell your health care provider if a child who is getting flu vaccine has ever had a seizure.  People sometimes faint after  medical procedures, including vaccination. Tell your provider if you feel dizzy or have vision changes or ringing in the ears.  As with any medicine, there is a very remote chance of a vaccine causing a severe allergic reaction, other serious injury, or death.  5. What if there is a serious problem?  An allergic reaction could occur after the vaccinated person leaves the clinic. If you see signs of a severe allergic reaction (hives, swelling of the face and throat, difficulty breathing, a fast heartbeat, dizziness, or weakness), call 9-1-1 and get the person to the nearest hospital.  For other signs that concern you, call your health care provider.  Adverse reactions should be reported to the Vaccine Adverse Event Reporting System (VAERS). Your health care provider will usually file this report, or you can do it yourself. Visit the VAERS website at www.vaers.[facility].gov or call 1-782.610.1339. VAERS is only for reporting reactions, and VAERS staff do not give medical advice.  6. The National Vaccine Injury Compensation Program  The National Vaccine Injury Compensation Program (VICP) is a federal program that was created to compensate people who may have been injured by certain vaccines. Visit the VICP website at www.hrsa.gov/vaccinecompensation or call 1-398.430.7735 to learn about the program and about filing a claim. There is a time limit to file a claim for compensation.  7. How can I learn more?  · Ask your healthcare provider.  · Call your local or state health department.  · Contact the Centers for Disease Control and Prevention (CDC):  ? Call 1-931.912.3480 (1-908-FIC-INFO) or  ? Visit CDC's www.cdc.gov/flu  Vaccine Information Statement (Interim) Inactivated Influenza Vaccine (8/15/2019)  This information is not intended to replace advice given to you by your health care provider. Make sure you discuss any questions you have with your health care provider.  Document Revised: 12/09/2020 Document Reviewed:  12/09/2020  [employer] Patient Education © 2021 [employer]

## 2021-11-08 ENCOUNTER — LAB (OUTPATIENT)
Dept: INTERNAL MEDICINE | Facility: CLINIC | Age: 56
End: 2021-11-08

## 2021-11-08 DIAGNOSIS — I10 ESSENTIAL HYPERTENSION: ICD-10-CM

## 2021-11-08 DIAGNOSIS — E87.1 HYPONATREMIA: ICD-10-CM

## 2021-11-08 PROCEDURE — 80048 BASIC METABOLIC PNL TOTAL CA: CPT | Performed by: STUDENT IN AN ORGANIZED HEALTH CARE EDUCATION/TRAINING PROGRAM

## 2021-11-09 LAB
ANION GAP SERPL CALCULATED.3IONS-SCNC: 9.4 MMOL/L (ref 5–15)
BUN SERPL-MCNC: 8 MG/DL (ref 6–20)
BUN/CREAT SERPL: 12.5 (ref 7–25)
CALCIUM SPEC-SCNC: 10.3 MG/DL (ref 8.6–10.5)
CHLORIDE SERPL-SCNC: 88 MMOL/L (ref 98–107)
CO2 SERPL-SCNC: 28.6 MMOL/L (ref 22–29)
CREAT SERPL-MCNC: 0.64 MG/DL (ref 0.76–1.27)
GFR SERPL CREATININE-BSD FRML MDRD: 130 ML/MIN/1.73
GLUCOSE SERPL-MCNC: 94 MG/DL (ref 65–99)
POTASSIUM SERPL-SCNC: 4.9 MMOL/L (ref 3.5–5.2)
SODIUM SERPL-SCNC: 126 MMOL/L (ref 136–145)

## 2021-11-10 ENCOUNTER — TELEPHONE (OUTPATIENT)
Dept: INTERNAL MEDICINE | Facility: CLINIC | Age: 56
End: 2021-11-10

## 2021-11-10 ENCOUNTER — DOCUMENTATION (OUTPATIENT)
Dept: INTERNAL MEDICINE | Facility: CLINIC | Age: 56
End: 2021-11-10

## 2021-11-10 DIAGNOSIS — E87.1 HYPONATREMIA: Primary | ICD-10-CM

## 2021-11-10 RX ORDER — AMLODIPINE BESYLATE 10 MG/1
10 TABLET ORAL DAILY
Qty: 90 TABLET | Refills: 3 | Status: SHIPPED | OUTPATIENT
Start: 2021-11-10 | End: 2021-11-11

## 2021-11-10 NOTE — TELEPHONE ENCOUNTER
ATTEMPTED TO CONTACT PT PER PROVIDER'S INSTRUCTIONS     NO ANSWER     LEFT VOICEMAIL WITH INSTRUCTIONS TO RETURN CALL TO OFFICE AT [phone]    OK FOR HUB TO ADVISE/READ   Sid Arango MD   11/10/2021  6:11 AM EST Back to Top        Sodium has decreased from 133 to 126.  I would like for you to stop your hydrochlorothiazide, as I susspect this medicine has caused this change.     I have sent a prescription for amlodipine 10 mg to use instead.     I would like for you to get repeat labs at our outpatient labs a week from today to check your sodium.

## 2021-11-10 NOTE — TELEPHONE ENCOUNTER
----- Message from Sid Arango MD sent at 11/10/2021  6:11 AM EST -----  Sodium has decreased from 133 to 126.  I would like for you to stop your hydrochlorothiazide, as I susspect this medicine has caused this change.    I have sent a prescription for amlodipine 10 mg to use instead.    I would like for you to get repeat labs at our outpatient labs a week from today to check your sodium.

## 2021-11-10 NOTE — PROGRESS NOTES
Sodium has decreased from 133 to 126.  I would like for you to stop your hydrochlorothiazide, as I susspect this medicine has caused this change.    I have sent a prescription for amlodipine 10 mg to use instead.    I would like for you to get repeat labs at our outpatient labs a week from today to check your sodium.

## 2021-11-11 ENCOUNTER — PATIENT ROUNDING (BHMG ONLY) (OUTPATIENT)
Dept: INTERNAL MEDICINE | Facility: CLINIC | Age: 56
End: 2021-11-11

## 2021-11-11 RX ORDER — LOSARTAN POTASSIUM 50 MG/1
50 TABLET ORAL 2 TIMES DAILY
Qty: 180 TABLET | Refills: 3 | Status: SHIPPED | OUTPATIENT
Start: 2021-11-11 | End: 2022-01-12 | Stop reason: SDUPTHER

## 2021-11-11 NOTE — TELEPHONE ENCOUNTER
ATTEMPTED TO CONTACT PT PER PROVIDER'S INSTRUCTIONS      NO ANSWER      LEFT VOICEMAIL WITH INSTRUCTIONS TO RETURN CALL TO OFFICE AT [phone]     OK FOR HUB TO ADVISE/READ   Sid Arango MD   11/10/2021  6:11 AM EST Back to Top         Sodium has decreased from 133 to 126.  I would like for you to stop your hydrochlorothiazide, as I susspect this medicine has caused this change.     I have sent a prescription for amlodipine 10 mg to use instead.     I would like for you to get repeat labs at our outpatient labs a week from today to check your sodium.

## 2021-12-20 ENCOUNTER — LAB (OUTPATIENT)
Dept: LAB | Facility: HOSPITAL | Age: 56
End: 2021-12-20

## 2021-12-20 DIAGNOSIS — E87.1 HYPONATREMIA: ICD-10-CM

## 2021-12-20 LAB
ANION GAP SERPL CALCULATED.3IONS-SCNC: 7.9 MMOL/L (ref 5–15)
BUN SERPL-MCNC: 12 MG/DL (ref 6–20)
BUN/CREAT SERPL: 13.6 (ref 7–25)
CALCIUM SPEC-SCNC: 9.6 MG/DL (ref 8.6–10.5)
CHLORIDE SERPL-SCNC: 98 MMOL/L (ref 98–107)
CO2 SERPL-SCNC: 26.1 MMOL/L (ref 22–29)
CREAT SERPL-MCNC: 0.88 MG/DL (ref 0.76–1.27)
GFR SERPL CREATININE-BSD FRML MDRD: 90 ML/MIN/1.73
GLUCOSE SERPL-MCNC: 93 MG/DL (ref 65–99)
POTASSIUM SERPL-SCNC: 4.5 MMOL/L (ref 3.5–5.2)
SODIUM SERPL-SCNC: 132 MMOL/L (ref 136–145)

## 2021-12-20 PROCEDURE — 36415 COLL VENOUS BLD VENIPUNCTURE: CPT

## 2021-12-20 PROCEDURE — 80048 BASIC METABOLIC PNL TOTAL CA: CPT

## 2022-01-12 ENCOUNTER — OFFICE VISIT (OUTPATIENT)
Dept: INTERNAL MEDICINE | Facility: CLINIC | Age: 57
End: 2022-01-12

## 2022-01-12 VITALS
HEIGHT: 69 IN | OXYGEN SATURATION: 95 % | TEMPERATURE: 98.3 F | HEART RATE: 83 BPM | DIASTOLIC BLOOD PRESSURE: 85 MMHG | WEIGHT: 226 LBS | SYSTOLIC BLOOD PRESSURE: 144 MMHG | BODY MASS INDEX: 33.47 KG/M2

## 2022-01-12 DIAGNOSIS — K44.9 HIATAL HERNIA: ICD-10-CM

## 2022-01-12 DIAGNOSIS — E87.1 HYPONATREMIA: ICD-10-CM

## 2022-01-12 DIAGNOSIS — I10 ESSENTIAL HYPERTENSION: Primary | ICD-10-CM

## 2022-01-12 DIAGNOSIS — R60.9 ASYMMETRIC EDEMA OF BOTH LOWER EXTREMITIES: ICD-10-CM

## 2022-01-12 DIAGNOSIS — N40.0 BENIGN PROSTATIC HYPERPLASIA WITHOUT LOWER URINARY TRACT SYMPTOMS: ICD-10-CM

## 2022-01-12 DIAGNOSIS — K08.9 CHRONIC DENTAL PAIN: ICD-10-CM

## 2022-01-12 DIAGNOSIS — E66.09 CLASS 1 OBESITY DUE TO EXCESS CALORIES WITHOUT SERIOUS COMORBIDITY WITH BODY MASS INDEX (BMI) OF 33.0 TO 33.9 IN ADULT: ICD-10-CM

## 2022-01-12 DIAGNOSIS — E78.00 PURE HYPERCHOLESTEROLEMIA: ICD-10-CM

## 2022-01-12 DIAGNOSIS — G89.29 CHRONIC DENTAL PAIN: ICD-10-CM

## 2022-01-12 LAB
CHOLEST SERPL-MCNC: 189 MG/DL (ref 0–200)
HDLC SERPL-MCNC: 47 MG/DL (ref 40–60)
LDLC SERPL CALC-MCNC: 117 MG/DL (ref 0–100)
LDLC/HDLC SERPL: 2.43 {RATIO}
TRIGL SERPL-MCNC: 139 MG/DL (ref 0–150)
VLDLC SERPL-MCNC: 25 MG/DL (ref 5–40)

## 2022-01-12 PROCEDURE — 80048 BASIC METABOLIC PNL TOTAL CA: CPT | Performed by: STUDENT IN AN ORGANIZED HEALTH CARE EDUCATION/TRAINING PROGRAM

## 2022-01-12 PROCEDURE — 99214 OFFICE O/P EST MOD 30 MIN: CPT | Performed by: STUDENT IN AN ORGANIZED HEALTH CARE EDUCATION/TRAINING PROGRAM

## 2022-01-12 PROCEDURE — 80061 LIPID PANEL: CPT | Performed by: STUDENT IN AN ORGANIZED HEALTH CARE EDUCATION/TRAINING PROGRAM

## 2022-01-12 RX ORDER — ATORVASTATIN CALCIUM 20 MG/1
20 TABLET, FILM COATED ORAL DAILY
Qty: 90 TABLET | Refills: 3 | Status: SHIPPED | OUTPATIENT
Start: 2022-01-12 | End: 2022-01-15

## 2022-01-12 RX ORDER — TAMSULOSIN HYDROCHLORIDE 0.4 MG/1
1 CAPSULE ORAL DAILY
Qty: 90 CAPSULE | Refills: 3 | Status: SHIPPED | OUTPATIENT
Start: 2022-01-12 | End: 2022-08-01 | Stop reason: SDUPTHER

## 2022-01-12 RX ORDER — HYDRALAZINE HYDROCHLORIDE 10 MG/1
10 TABLET, FILM COATED ORAL 3 TIMES DAILY
Qty: 270 TABLET | Refills: 3 | Status: SHIPPED | OUTPATIENT
Start: 2022-01-12 | End: 2022-08-01 | Stop reason: SDUPTHER

## 2022-01-12 RX ORDER — OMEPRAZOLE 20 MG/1
20 CAPSULE, DELAYED RELEASE ORAL DAILY
Qty: 90 CAPSULE | Refills: 3 | Status: SHIPPED | OUTPATIENT
Start: 2022-01-12 | End: 2022-11-02 | Stop reason: SDUPTHER

## 2022-01-12 RX ORDER — LOSARTAN POTASSIUM 50 MG/1
50 TABLET ORAL 2 TIMES DAILY
Qty: 180 TABLET | Refills: 3 | Status: SHIPPED | OUTPATIENT
Start: 2022-01-12 | End: 2022-08-01 | Stop reason: SDUPTHER

## 2022-01-12 NOTE — PROGRESS NOTES
"Chief Complaint  Follow-up and Hypertension    Subjective          Jose Luis Casas presents to [facility]  History of Present Illness    Mr. Casas reports that he forgot to bring his blood pressure log to clinic today.  However he reports that since starting his new regimen his blood pressures are typically been in the systolic 150s over 80s.  He is compliant with his blood pressure medicine, and he is compliant with a low-sodium diet of less than 1500 mg of sodium a day.    He proudly reports that he has been incorporating components of Mediterranean diet into his diet since last visit.  Among other things eating canned salmon and tuna.  Also consuming more vegetables.  He and his wife avoid carbs.    He reports that his legs are swollen today with left greater than right.    He also reports that for the last 6 months has been having some rear dental pain on the left side in the upper and lower teeth of that region.  This pain is particularly notable with biting down or with cold substances.  He reports that he is seeing dentistry in 2 to 3 weeks.    Current Outpatient Medications   Medication Instructions   • atorvastatin (LIPITOR) 20 mg, Oral, Daily   • Cholecalciferol 125 MCG (5000 UT) tablet Vitamin D3 5,000 unit oral tablet take 1 tablet by oral route daily   Suspended   • hydrALAZINE (APRESOLINE) 10 mg, Oral, 3 Times Daily   • losartan (COZAAR) 50 mg, Oral, 2 Times Daily   • Mirabegron ER (MYRBETRIQ) 50 mg, Oral, Daily   • omeprazole (PRILOSEC) 20 mg, Oral, Daily   • tamsulosin (FLOMAX) 0.4 mg, Oral, Daily       The following portions of the patient's history were reviewed and updated as appropriate: allergies, current medications, past family history, past medical history, past social history, past surgical history, and problem list.    Objective   Vital Signs:   /85   Pulse 83   Temp 98.3 °F (36.8 °C)   Ht 175.3 cm (69\")   Wt 103 kg (226 lb)   SpO2 " 95%   BMI 33.37 kg/m²     Wt Readings from Last 3 Encounters:   01/12/22 103 kg (226 lb)   10/07/21 102 kg (225 lb)   07/07/21 98.4 kg (217 lb)     BP Readings from Last 3 Encounters:   01/12/22 144/85   10/07/21 163/97   07/07/21 147/93     Physical Exam  Constitutional:       General: He is not in acute distress.     Appearance: Normal appearance. He is obese. He is not ill-appearing or toxic-appearing.   HENT:      Head: Normocephalic and atraumatic.      Mouth/Throat:      Mouth: Mucous membranes are moist.      Pharynx: Oropharynx is clear.      Comments: Amalgam fillings noted in rear teeth both mandibular and maxillary areas on the left side.  These teeth are nontender to palpation/percussion.  Left maxillary posterior teeth with recessed gums noted.  Gums otherwise unremarkable.  Cardiovascular:      Rate and Rhythm: Normal rate and regular rhythm.      Pulses: Normal pulses.      Heart sounds: Normal heart sounds. No murmur heard.      Pulmonary:      Effort: Pulmonary effort is normal.      Breath sounds: Normal breath sounds.   Abdominal:      General: Abdomen is flat.      Palpations: Abdomen is soft.      Tenderness: There is no abdominal tenderness.   Musculoskeletal:      Right lower leg: Edema present.      Left lower leg: Edema present.      Comments: 2+ LLE edema.  1+ RLE edema   Skin:     General: Skin is warm and dry.   Neurological:      General: No focal deficit present.      Mental Status: He is alert. Mental status is at baseline.   Psychiatric:         Mood and Affect: Mood normal.         Behavior: Behavior normal.         Thought Content: Thought content normal.         Judgment: Judgment normal.         Result Review :   The following data was reviewed by: Sid Arango MD on 01/12/2022:  Common labs    Common Labsle 4/2/21 11/8/21 12/20/21   Glucose 124 (A) 94 93   BUN 10 8 12   Creatinine 0.92 0.64 (A) 0.88   eGFR Non African Am  130 90   Sodium 133 (A) 126 (A) 132 (A)   Potassium  4.7 4.9 4.5   Chloride 96 (A) 88 (A) 98   Calcium 9.8 10.3 9.6   Albumin 4.7     Total Bilirubin 0.36     Alkaline Phosphatase 62     AST (SGOT) 23     ALT (SGPT) 21     WBC 7.97     Hemoglobin 15.1     Hematocrit 45.1     Platelets 289     Total Cholesterol 186     Triglycerides 138     HDL Cholesterol 56     LDL Cholesterol  102 (A)     (A) Abnormal value       Comments are available for some flowsheets but are not being displayed.                Lab Results   Component Value Date    COVID19 NOT DETECTED 12/05/2020    INR 0.93 (L) 03/23/2021       Procedures        Assessment and Plan    Diagnoses and all orders for this visit:    1. Essential hypertension (Primary)  -     Basic metabolic panel  -     losartan (COZAAR) 50 MG tablet; Take 1 tablet by mouth 2 (Two) Times a Day.  Dispense: 180 tablet; Refill: 3  -     hydrALAZINE (APRESOLINE) 10 MG tablet; Take 1 tablet by mouth 3 (Three) Times a Day.  Dispense: 270 tablet; Refill: 3    2. Hyponatremia  -     Basic metabolic panel    3. Pure hypercholesterolemia  -     Lipid Panel  -     atorvastatin (LIPITOR) 20 MG tablet; Take 1 tablet by mouth Daily.  Dispense: 90 tablet; Refill: 3    4. Hiatal hernia  -     omeprazole (priLOSEC) 20 MG capsule; Take 1 capsule by mouth Daily.  Dispense: 90 capsule; Refill: 3    5. Benign prostatic hyperplasia without lower urinary tract symptoms  -     Mirabegron ER (Myrbetriq) 50 MG tablet sustained-release 24 hour 24 hr tablet; Take 50 mg by mouth Daily.  Dispense: 90 tablet; Refill: 3  -     tamsulosin (FLOMAX) 0.4 MG capsule 24 hr capsule; Take 1 capsule by mouth Daily.  Dispense: 90 capsule; Refill: 3    6. Asymmetric edema of both lower extremities  -     Duplex Venous Lower Extremity - Bilateral CAR; Future    7. Chronic dental pain    8. Class 1 obesity due to excess calories without serious comorbidity with body mass index (BMI) of 33.0 to 33.9 in adult    HTN:  -Improved, but still above goal of less than 130/80  -We will  recheck labs given history of hyponatremia; did not tolerate diuretic  -Did not tolerate amlodipine due to excessive lower extremity swelling  -We will add hydralazine to losartan  -Low-sodium diet counseling today today, recommending less than 1500 mg of sodium a day  -He reports that he is compliant with a low-sodium diet    Chronic Dental Pain:  -Physical exam was reassuring; recommended that he discuss with dentistry which she will be seeing in about 2 to 3 weeks    Obesity:  -Weight stable compared to October  -Discussed the importance of regular exercise as well as portion control  -Nutritional counseling today including the Mediterranean diet as well as a low-sodium diet (recommended less than 1500 mg sodium a day)      Medications Discontinued During This Encounter   Medication Reason   • Mirabegron ER (Myrbetriq) 50 MG tablet sustained-release 24 hour 24 hr tablet Reorder   • omeprazole (priLOSEC) 20 MG capsule Reorder   • tamsulosin (FLOMAX) 0.4 MG capsule 24 hr capsule Reorder   • atorvastatin (LIPITOR) 20 MG tablet Reorder   • losartan (COZAAR) 50 MG tablet Reorder          Follow Up   Return in about 3 months (around 4/12/2022) for Annual physical.  Patient was given instructions and counseling regarding his condition or for health maintenance advice. Please see specific information pulled into the AVS if appropriate.

## 2022-01-13 LAB
ANION GAP SERPL CALCULATED.3IONS-SCNC: 8.8 MMOL/L (ref 5–15)
BUN SERPL-MCNC: 13 MG/DL (ref 6–20)
BUN/CREAT SERPL: 14.6 (ref 7–25)
CALCIUM SPEC-SCNC: 9.9 MG/DL (ref 8.6–10.5)
CHLORIDE SERPL-SCNC: 98 MMOL/L (ref 98–107)
CO2 SERPL-SCNC: 25.2 MMOL/L (ref 22–29)
CREAT SERPL-MCNC: 0.89 MG/DL (ref 0.76–1.27)
GFR SERPL CREATININE-BSD FRML MDRD: 89 ML/MIN/1.73
GLUCOSE SERPL-MCNC: 88 MG/DL (ref 65–99)
POTASSIUM SERPL-SCNC: 4.8 MMOL/L (ref 3.5–5.2)
SODIUM SERPL-SCNC: 132 MMOL/L (ref 136–145)

## 2022-01-15 DIAGNOSIS — E78.00 PURE HYPERCHOLESTEROLEMIA: ICD-10-CM

## 2022-01-15 RX ORDER — ATORVASTATIN CALCIUM 40 MG/1
40 TABLET, FILM COATED ORAL DAILY
Qty: 90 TABLET | Refills: 3 | Status: SHIPPED | OUTPATIENT
Start: 2022-01-15 | End: 2022-11-02 | Stop reason: SDUPTHER

## 2022-01-25 ENCOUNTER — APPOINTMENT (OUTPATIENT)
Dept: CARDIOLOGY | Facility: HOSPITAL | Age: 57
End: 2022-01-25

## 2022-02-02 ENCOUNTER — HOSPITAL ENCOUNTER (OUTPATIENT)
Dept: CARDIOLOGY | Facility: HOSPITAL | Age: 57
Discharge: HOME | End: 2022-02-02
Admitting: STUDENT IN AN ORGANIZED HEALTH CARE EDUCATION/TRAINING PROGRAM

## 2022-02-02 DIAGNOSIS — R60.9 ASYMMETRIC EDEMA OF BOTH LOWER EXTREMITIES: ICD-10-CM

## 2022-02-02 LAB

## 2022-02-02 PROCEDURE — 93970 EXTREMITY STUDY: CPT | Performed by: SURGERY

## 2022-02-02 PROCEDURE — 93970 EXTREMITY STUDY: CPT

## 2022-04-05 NOTE — PATIENT INSTRUCTIONS
Cooking With Less Salt  Cooking with less salt is one way to reduce the amount of sodium you get from food. Sodium is one of the elements that make up salt. It is found naturally in foods and is also added to certain foods. Depending on your condition and overall health, your health care provider or dietitian may recommend that you reduce your sodium intake. Most people should have less than 2,300 milligrams (mg) of sodium each day. If you have high blood pressure (hypertension), you may need to limit your sodium to 1,500 mg each day. Follow the tips below to help reduce your sodium intake.  What are tips for eating less sodium?  Reading food labels       Check the food label before buying or using packaged ingredients. Always check the label for the serving size and sodium content.  Look for products with no more than 140 mg of sodium in one serving.  Check the % Daily Value column to see what percent of the daily recommended amount of sodium is provided in one serving of the product. Foods with 5% or less in this column are considered low in sodium. Foods with 20% or higher are considered high in sodium.  Do not choose foods with salt as one of the first three ingredients on the ingredients list. If salt is one of the first three ingredients, it usually means the item is high in sodium.     Shopping  Buy sodium-free or low-sodium products. Look for the following words on food labels:  Low-sodium.  Sodium-free.  Reduced-sodium.  No salt added.  Unsalted.  Always check the sodium content even if foods are labeled as low-sodium or no salt added.  Buy fresh foods.  Cooking  Use herbs, seasonings without salt, and spices as substitutes for salt.  Use sodium-free baking soda when baking.  Nesbitt, braise, or roast foods to add flavor with less salt.  Avoid adding salt to pasta, rice, or hot cereals.  Drain and rinse canned vegetables, beans, and meat before use.  Avoid adding salt when cooking sweets and desserts.  Cook  "with low-sodium ingredients.  What foods are high in sodium?  Vegetables  Regular canned vegetables (not low-sodium or reduced-sodium). Sauerkraut, pickled vegetables, and relishes. Olives. French fries. Onion rings. Regular canned tomato sauce and paste. Regular tomato and vegetable juice. Frozen vegetables in sauces.  Grains  Instant hot cereals. Bread stuffing, pancake, and biscuit mixes. Croutons. Seasoned rice or pasta mixes. Noodle soup cups. Boxed or frozen macaroni and cheese. Regular salted crackers. Self-rising flour. Rolls. Bagels. Flour tortillas and wraps.  Meats and other proteins  Meat or fish that is salted, canned, smoked, cured, spiced, or pickled. This includes pérez, ham, sausages, hot dogs, corned beef, chipped beef, meat loaves, salt pork, jerky, pickled herring, anchovies, regular canned tuna, and sardines. Salted nuts.  Dairy  Processed cheese and cheese spreads. Cheese curds. Blue cheese. Feta cheese. String cheese. Regular cottage cheese. Buttermilk. Canned milk.  The items listed above may not be a complete list of foods high in sodium. Actual amounts of sodium may be different depending on processing. Contact a dietitian for more information.  What foods are low in sodium?  Fruits  Fresh, frozen, or canned fruit with no sauce added. Fruit juice.  Vegetables  Fresh or frozen vegetables with no sauce added. \"No salt added\" canned vegetables. \"No salt added\" tomato sauce and paste. Low-sodium or reduced-sodium tomato and vegetable juice.  Grains  Noodles, pasta, quinoa, rice. Shredded or puffed wheat or puffed rice. Regular or quick oats (not instant). Low-sodium crackers. Low-sodium bread. Whole-grain bread and whole-grain pasta. Unsalted popcorn.  Meats and other proteins  Fresh or frozen whole meats, poultry (not injected with sodium), and fish with no sauce added. Unsalted nuts. Dried peas, beans, and lentils without added salt. Unsalted canned beans. Eggs. Unsalted nut butters. " Low-sodium canned tuna or chicken.  Dairy  Milk. Soy milk. Yogurt. Low-sodium cheeses, such as Swiss, [place] Mehrdad, mozzarella, and ricotta. Sherbet or ice cream (keep to ½ cup per serving). Cream cheese.  Fats and oils  Unsalted butter or margarine.  Other foods  Homemade pudding. Sodium-free baking soda and baking powder. Herbs and spices. Low-sodium seasoning mixes.  Beverages  Coffee and tea. Carbonated beverages.  The items listed above may not be a complete list of foods low in sodium. Actual amounts of sodium may be different depending on processing. Contact a dietitian for more information.  What are some salt alternatives when cooking?  The following are herbs, seasonings, and spices that can be used instead of salt to flavor your food. Herbs should be fresh or dried. Do not choose packaged mixes. Next to the name of the herb, spice, or seasoning are some examples of foods you can pair it with.  Herbs  Bay leaves - Soups, meat and vegetable dishes, and spaghetti sauce.  Basil - Italian dishes, soups, pasta, and fish dishes.  Cilantro - Meat, poultry, and vegetable dishes.  Chili powder - Marinades and Mexican dishes.  Chives - Salad dressings and potato dishes.  Cumin - Mexican dishes, couscous, and meat dishes.  Dill - Fish dishes, sauces, and salads.  Fennel - Meat and vegetable dishes, breads, and cookies.  Garlic (do not use garlic salt) - Italian dishes, meat dishes, salad dressings, and sauces.  Marjoram - Soups, potato dishes, and meat dishes.  Oregano - Pizza and spaghetti sauce.  Parsley - Salads, soups, pasta, and meat dishes.  Lynda - Italian dishes, salad dressings, soups, and red meats.  Saffron - Fish dishes, pasta, and some poultry dishes.  Espinoza - Stuffings and sauces.  Tarragon - Fish and poultry dishes.  Thyme - Stuffing, meat, and fish dishes.  Seasonings  Lemon juice - Fish dishes, poultry dishes, vegetables, and salads.  Vinegar - Salad dressings, vegetables, and fish  "dishes.  Spices  Cinnamon - Sweet dishes, such as cakes, cookies, and puddings.  Cloves - Gingerbread, puddings, and marinades for meats.  Stewart - Vegetable dishes, fish and poultry dishes, and stir-patton dishes.  Mira - Vegetable dishes, fish dishes, and stir-patton dishes.  Nutmeg - Pasta, vegetables, poultry, fish dishes, and custard.  Summary  Cooking with less salt is one way to reduce the amount of sodium that you get from food.  Buy sodium-free or low-sodium products.  Check the food label before using or buying packaged ingredients.  Use herbs, seasonings without salt, and spices as substitutes for salt in foods.  This information is not intended to replace advice given to you by your health care provider. Make sure you discuss any questions you have with your health care provider.  Document Revised: 12/09/2020 Document Reviewed: 12/09/2020  [employer] Patient Education © 2021 [employer]      https://www.nhlbi.nih.gov/files/docs/public/heart/dash_brief.pdf\">   DASH Eating Plan  DASH stands for Dietary Approaches to Stop Hypertension. The DASH eating plan is a healthy eating plan that has been shown to:  Reduce high blood pressure (hypertension).  Reduce your risk for type 2 diabetes, heart disease, and stroke.  Help with weight loss.  What are tips for following this plan?  Reading food labels  Check food labels for the amount of salt (sodium) per serving. Choose foods with less than 5 percent of the Daily Value of sodium. Generally, foods with less than 300 milligrams (mg) of sodium per serving fit into this eating plan.  To find whole grains, look for the word \"whole\" as the first word in the ingredient list.  Shopping  Buy products labeled as \"low-sodium\" or \"no salt added.\"  Buy fresh foods. Avoid canned foods and pre-made or frozen meals.  Cooking  Avoid adding salt when cooking. Use salt-free seasonings or herbs instead of table salt or sea salt. Check with your health care provider or pharmacist before " using salt substitutes.  Do not patton foods. Cook foods using healthy methods such as baking, boiling, grilling, roasting, and broiling instead.  Cook with heart-healthy oils, such as olive, canola, avocado, soybean, or sunflower oil.  Meal planning       Eat a balanced diet that includes:  4 or more servings of fruits and 4 or more servings of vegetables each day. Try to fill one-half of your plate with fruits and vegetables.  6-8 servings of whole grains each day.  Less than 6 oz (170 g) of lean meat, poultry, or fish each day. A 3-oz (85-g) serving of meat is about the same size as a deck of cards. One egg equals 1 oz (28 g).  2-3 servings of low-fat dairy each day. One serving is 1 cup (237 mL).  1 serving of nuts, seeds, or beans 5 times each week.  2-3 servings of heart-healthy fats. Healthy fats called omega-3 fatty acids are found in foods such as walnuts, flaxseeds, fortified milks, and eggs. These fats are also found in cold-water fish, such as sardines, salmon, and mackerel.  Limit how much you eat of:  Canned or prepackaged foods.  Food that is high in trans fat, such as some fried foods.  Food that is high in saturated fat, such as fatty meat.  Desserts and other sweets, sugary drinks, and other foods with added sugar.  Full-fat dairy products.  Do not salt foods before eating.  Do not eat more than 4 egg yolks a week.  Try to eat at least 2 vegetarian meals a week.  Eat more home-cooked food and less restaurant, buffet, and fast food.     Lifestyle  When eating at a restaurant, ask that your food be prepared with less salt or no salt, if possible.  If you drink alcohol:  Limit how much you use to:  0-1 drink a day for women who are not pregnant.  0-2 drinks a day for men.  Be aware of how much alcohol is in your drink. In the [place] one drink equals one 12 oz bottle of beer (355 mL), one 5 oz glass of wine (148 mL), or one 1½ oz glass of hard liquor (44 mL).  General information  Avoid eating more than  2,300 mg of salt a day. If you have hypertension, you may need to reduce your sodium intake to 1,500 mg a day.  Work with your health care provider to maintain a healthy body weight or to lose weight. Ask what an ideal weight is for you.  Get at least 30 minutes of exercise that causes your heart to beat faster (aerobic exercise) most days of the week. Activities may include walking, swimming, or biking.  Work with your health care provider or dietitian to adjust your eating plan to your individual calorie needs.  What foods should I eat?  Fruits  All fresh, dried, or frozen fruit. Canned fruit in natural juice (without added sugar).  Vegetables  Fresh or frozen vegetables (raw, steamed, roasted, or grilled). Low-sodium or reduced-sodium tomato and vegetable juice. Low-sodium or reduced-sodium tomato sauce and tomato paste. Low-sodium or reduced-sodium canned vegetables.  Grains  Whole-grain or whole-wheat bread. Whole-grain or whole-wheat pasta. Brown rice. Oatmeal. Quinoa. Bulgur. Whole-grain and low-sodium cereals. Maira D bread. Low-fat, low-sodium crackers. Whole-wheat flour tortillas.  Meats and other proteins  Skinless chicken or turkey. Ground chicken or turkey. Pork with fat trimmed off. Fish and seafood. Egg whites. Dried beans, peas, or lentils. Unsalted nuts, nut butters, and seeds. Unsalted canned beans. Lean cuts of beef with fat trimmed off. Low-sodium, lean precooked or cured meat, such as sausages or meat loaves.  Dairy  Low-fat (1%) or fat-free (skim) milk. Reduced-fat, low-fat, or fat-free cheeses. Nonfat, low-sodium ricotta or cottage cheese. Low-fat or nonfat yogurt. Low-fat, low-sodium cheese.  Fats and oils  Soft margarine without trans fats. Vegetable oil. Reduced-fat, low-fat, or light mayonnaise and salad dressings (reduced-sodium). Canola, safflower, olive, avocado, soybean, and sunflower oils. Avocado.  Seasonings and condiments  Herbs. Spices. Seasoning mixes without salt.  Other  foods  Unsalted popcorn and pretzels. Fat-free sweets.  The items listed above may not be a complete list of foods and beverages you can eat. Contact a dietitian for more information.  What foods should I avoid?  Fruits  Canned fruit in a light or heavy syrup. Fried fruit. Fruit in cream or butter sauce.  Vegetables  Creamed or fried vegetables. Vegetables in a cheese sauce. Regular canned vegetables (not low-sodium or reduced-sodium). Regular canned tomato sauce and paste (not low-sodium or reduced-sodium). Regular tomato and vegetable juice (not low-sodium or reduced-sodium). Pickles. Olives.  Grains  Baked goods made with fat, such as croissants, muffins, or some breads. Dry pasta or rice meal packs.  Meats and other proteins  Fatty cuts of meat. Ribs. Fried meat. Baugh. Bologna, salami, and other precooked or cured meats, such as sausages or meat loaves. Fat from the back of a pig (fatback). Bratwurst. Salted nuts and seeds. Canned beans with added salt. Canned or smoked fish. Whole eggs or egg yolks. Chicken or turkey with skin.  Dairy  Whole or 2% milk, cream, and half-and-half. Whole or full-fat cream cheese. Whole-fat or sweetened yogurt. Full-fat cheese. Nondairy creamers. Whipped toppings. Processed cheese and cheese spreads.  Fats and oils  Butter. Stick margarine. Lard. Shortening. Ghee. Baugh fat. Tropical oils, such as coconut, palm kernel, or palm oil.  Seasonings and condiments  Onion salt, garlic salt, seasoned salt, table salt, and sea salt. Worcestershire sauce. Tartar sauce. Barbecue sauce. Teriyaki sauce. Soy sauce, including reduced-sodium. Steak sauce. Canned and packaged gravies. Fish sauce. Oyster sauce. Cocktail sauce. Store-bought horseradish. Ketchup. Mustard. Meat flavorings and tenderizers. Bouillon cubes. Hot sauces. Pre-made or packaged marinades. Pre-made or packaged taco seasonings. Relishes. Regular salad dressings.  Other foods  Salted popcorn and pretzels.  The items listed above  may not be a complete list of foods and beverages you should avoid. Contact a dietitian for more information.  Where to find more information  National Heart, Lung, and Blood [place]: www.nhlbi.nih.gov  American Heart Association: www.heart.org  Academy of Nutrition and Dietetics: www.eatright.org  National Kidney Foundation: www.kidney.org  Summary  The DASH eating plan is a healthy eating plan that has been shown to reduce high blood pressure (hypertension). It may also reduce your risk for type 2 diabetes, heart disease, and stroke.  When on the DASH eating plan, aim to eat more fresh fruits and vegetables, whole grains, lean proteins, low-fat dairy, and heart-healthy fats.  With the DASH eating plan, you should limit salt (sodium) intake to 2,300 mg a day. If you have hypertension, you may need to reduce your sodium intake to 1,500 mg a day.  Work with your health care provider or dietitian to adjust your eating plan to your individual calorie needs.  This information is not intended to replace advice given to you by your health care provider. Make sure you discuss any questions you have with your health care provider.  Document Revised: 11/20/2020 Document Reviewed: 11/20/2020  [employer] Patient Education © 2021 [employer].       Heart-Healthy Eating Plan  Heart-healthy meal planning includes:  Eating less unhealthy fats.  Eating more healthy fats.  Making other changes in your diet.  Talk with your doctor or a diet specialist (dietitian) to create an eating plan that is right for you.  What is my plan?  Your doctor may recommend an eating plan that includes:  Total fat: ______% or less of total calories a day.  Saturated fat: ______% or less of total calories a day.  Cholesterol: less than _________mg a day.  What are tips for following this plan?  Cooking  Avoid frying your food. Try to bake, boil, grill, or broil it instead. You can also reduce fat by:  Removing the skin from poultry.  Removing all  visible fats from meats.  Steaming vegetables in water or broth.  Meal planning       At meals, divide your plate into four equal parts:  Fill one-half of your plate with vegetables and green salads.  Fill one-fourth of your plate with whole grains.  Fill one-fourth of your plate with lean protein foods.  Eat 4-5 servings of vegetables per day. A serving of vegetables is:  1 cup of raw or cooked vegetables.  2 cups of raw leafy greens.  Eat 4-5 servings of fruit per day. A serving of fruit is:  1 medium whole fruit.  ¼ cup of dried fruit.  ½ cup of fresh, frozen, or canned fruit.  ½ cup of 100% fruit juice.  Eat more foods that have soluble fiber. These are apples, broccoli, carrots, beans, peas, and barley. Try to get 20-30 g of fiber per day.  Eat 4-5 servings of nuts, legumes, and seeds per week:  1 serving of dried beans or legumes equals ½ cup after being cooked.  1 serving of nuts is ¼ cup.  1 serving of seeds equals 1 tablespoon.     General information  Eat more home-cooked food. Eat less restaurant, buffet, and fast food.  Limit or avoid alcohol.  Limit foods that are high in starch and sugar.  Avoid fried foods.  Lose weight if you are overweight.  Keep track of how much salt (sodium) you eat. This is important if you have high blood pressure. Ask your doctor to tell you more about this.  Try to add vegetarian meals each week.  Fats  Choose healthy fats. These include olive oil and canola oil, flaxseeds, walnuts, almonds, and seeds.  Eat more omega-3 fats. These include salmon, mackerel, sardines, tuna, flaxseed oil, and ground flaxseeds. Try to eat fish at least 2 times each week.  Check food labels. Avoid foods with trans fats or high amounts of saturated fat.  Limit saturated fats.  These are often found in animal products, such as meats, butter, and cream.  These are also found in plant foods, such as palm oil, palm kernel oil, and coconut oil.  Avoid foods with partially hydrogenated oils in them.  These have trans fats. Examples are stick margarine, some tub margarines, cookies, crackers, and other baked goods.  What foods can I eat?  Fruits  All fresh, canned (in natural juice), or frozen fruits.  Vegetables  Fresh or frozen vegetables (raw, steamed, roasted, or grilled). Green salads.  Grains  Most grains. Choose whole wheat and whole grains most of the time. Rice and pasta, including brown rice and pastas made with whole wheat.  Meats and other proteins  Lean, well-trimmed beef, veal, pork, and lamb. Chicken and turkey without skin. All fish and shellfish. Wild duck, rabbit, pheasant, and venison. Egg whites or low-cholesterol egg substitutes. Dried beans, peas, lentils, and tofu. Seeds and most nuts.  Dairy  Low-fat or nonfat cheeses, including ricotta and mozzarella. Skim or 1% milk that is liquid, powdered, or evaporated. Buttermilk that is made with low-fat milk. Nonfat or low-fat yogurt.  Fats and oils  Non-hydrogenated (trans-free) margarines. Vegetable oils, including soybean, sesame, sunflower, olive, peanut, safflower, corn, canola, and cottonseed. Salad dressings or mayonnaise made with a vegetable oil.  Beverages  Mineral water. Coffee and tea. Diet carbonated beverages.  Sweets and desserts  Sherbet, gelatin, and fruit ice. Small amounts of dark chocolate.  Limit all sweets and desserts.  Seasonings and condiments  All seasonings and condiments.  The items listed above may not be a complete list of foods and drinks you can eat. Contact a dietitian for more options.  What foods should I avoid?  Fruits  Canned fruit in heavy syrup. Fruit in cream or butter sauce. Fried fruit. Limit coconut.  Vegetables  Vegetables cooked in cheese, cream, or butter sauce. Fried vegetables.  Grains  Breads that are made with saturated or trans fats, oils, or whole milk. Croissants. Sweet rolls. Donuts. High-fat crackers, such as cheese crackers.  Meats and other proteins  Fatty meats, such as hot dogs, ribs,  sausage, pérez, rib-eye roast or steak. High-fat deli meats, such as salami and bologna. Caviar. Domestic duck and goose. Organ meats, such as liver.  Dairy  Cream, sour cream, cream cheese, and creamed cottage cheese. Whole-milk cheeses. Whole or 2% milk that is liquid, evaporated, or condensed. Whole buttermilk. Cream sauce or high-fat cheese sauce. Yogurt that is made from whole milk.  Fats and oils  Meat fat, or shortening. Cocoa butter, hydrogenated oils, palm oil, coconut oil, palm kernel oil. Solid fats and shortenings, including pérez fat, salt pork, lard, and butter. Nondairy cream substitutes. Salad dressings with cheese or sour cream.  Beverages  Regular sodas and juice drinks with added sugar.  Sweets and desserts  Frosting. Pudding. Cookies. Cakes. Pies. Milk chocolate or white chocolate. Buttered syrups. Full-fat ice cream or ice cream drinks.  The items listed above may not be a complete list of foods and drinks to avoid. Contact a dietitian for more information.  Summary  Heart-healthy meal planning includes eating less unhealthy fats, eating more healthy fats, and making other changes in your diet.  Eat a balanced diet. This includes fruits and vegetables, low-fat or nonfat dairy, lean protein, nuts and legumes, whole grains, and heart-healthy oils and fats.  This information is not intended to replace advice given to you by your health care provider. Make sure you discuss any questions you have with your health care provider.  Document Revised: 02/21/2019 Document Reviewed: 01/25/2019  Elsevier Patient Education © 2021 [employer] Inc.       Mediterranean Diet  A Mediterranean diet refers to food and lifestyle choices that are based on the traditions of countries located on the [place]. This way of eating has been shown to help prevent certain conditions and improve outcomes for people who have chronic diseases, like kidney disease and heart disease.  What are tips for following this  plan?  Lifestyle  Cook and eat meals together with your family, when possible.  Drink enough fluid to keep your urine clear or pale yellow.  Be physically active every day. This includes:  Aerobic exercise like running or swimming.  Leisure activities like gardening, walking, or housework.  Get 7-8 hours of sleep each night.  If recommended by your health care provider, drink red wine in moderation. This means 1 glass a day for nonpregnant women and 2 glasses a day for men. A glass of wine equals 5 oz (150 mL).  Reading food labels       Check the serving size of packaged foods. For foods such as rice and pasta, the serving size refers to the amount of cooked product, not dry.  Check the total fat in packaged foods. Avoid foods that have saturated fat or trans fats.  Check the ingredients list for added sugars, such as corn syrup.     Shopping  At the grocery store, buy most of your food from the areas near the walls of the store. This includes:  Fresh fruits and vegetables (produce).  Grains, beans, nuts, and seeds. Some of these may be available in unpackaged forms or large amounts (in bulk).  Fresh seafood.  Poultry and eggs.  Low-fat dairy products.  Buy whole ingredients instead of prepackaged foods.  Buy fresh fruits and vegetables in-season from local farmers markets.  Buy frozen fruits and vegetables in resealable bags.  If you do not have access to quality fresh seafood, buy precooked frozen shrimp or canned fish, such as tuna, salmon, or sardines.  Buy small amounts of raw or cooked vegetables, salads, or olives from the deli or salad bar at your store.  Stock your pantry so you always have certain foods on hand, such as olive oil, canned tuna, canned tomatoes, rice, pasta, and beans.  Cooking  Cook foods with extra-virgin olive oil instead of using butter or other vegetable oils.  Have meat as a side dish, and have vegetables or grains as your main dish. This means having meat in small portions or adding  small amounts of meat to foods like pasta or stew.  Use beans or vegetables instead of meat in common dishes like chili or lasagna.  [place] with different cooking methods. Try roasting or broiling vegetables instead of steaming or sautéeing them.  Add frozen vegetables to soups, stews, pasta, or rice.  Add nuts or seeds for added healthy fat at each meal. You can add these to yogurt, salads, or vegetable dishes.  Marinate fish or vegetables using olive oil, lemon juice, garlic, and fresh herbs.  Meal planning       Plan to eat 1 vegetarian meal one day each week. Try to work up to 2 vegetarian meals, if possible.  Eat seafood 2 or more times a week.  Have healthy snacks readily available, such as:  Vegetable sticks with hummus.  Greek yogurt.  Fruit and nut trail mix.  Eat balanced meals throughout the week. This includes:  Fruit: 2-3 servings a day  Vegetables: 4-5 servings a day  Low-fat dairy: 2 servings a day  Fish, poultry, or lean meat: 1 serving a day  Beans and legumes: 2 or more servings a week  Nuts and seeds: 1-2 servings a day  Whole grains: 6-8 servings a day  Extra-virgin olive oil: 3-4 servings a day  Limit red meat and sweets to only a few servings a month     What are my food choices?  Mediterranean diet  Recommended  Grains: Whole-grain pasta. Brown rice. Bulgar wheat. Polenta. Couscous. Whole-wheat bread. Oatmeal. Quinoa.  Vegetables: Artichokes. Beets. Broccoli. Cabbage. Carrots. Eggplant. Green beans. Chard. Kale. Spinach. Onions. Leeks. Peas. Squash. Tomatoes. Peppers. Radishes.  Fruits: Apples. Apricots. Avocado. Berries. Bananas. Cherries. Dates. Figs. Grapes. Malick. Melon. Oranges. Peaches. Plums. Pomegranate.  Meats and other protein foods: Beans. Almonds. Sunflower seeds. Pine nuts. Peanuts. Cod. Dawson. Scallops. Shrimp. Tuna. Tilapia. Clams. Oysters. Eggs.  Dairy: Low-fat milk. Cheese. Greek yogurt.  Beverages: Water. Red wine. Herbal tea.  Fats and oils: Extra virgin olive oil.  Avocado oil. Grape seed oil.  Sweets and desserts: Greek yogurt with honey. Baked apples. Poached pears. Trail mix.  Seasoning and other foods: Basil. Cilantro. Coriander. Cumin. Mint. Parsley. Espinoza. Rosemary. Tarragon. Garlic. Oregano. Thyme. Pepper. Balsalmic vinegar. Tahini. Hummus. Tomato sauce. Olives. Mushrooms.  Limit these  Grains: Prepackaged pasta or rice dishes. Prepackaged cereal with added sugar.  Vegetables: Deep fried potatoes (french fries).  Fruits: Fruit canned in syrup.  Meats and other protein foods: Beef. Pork. Lamb. Poultry with skin. Hot dogs. Baugh.  Dairy: Ice cream. Sour cream. Whole milk.  Beverages: Juice. Sugar-sweetened soft drinks. Beer. Liquor and spirits.  Fats and oils: Butter. Canola oil. Vegetable oil. Beef fat (tallow). Lard.  Sweets and desserts: Cookies. Cakes. Pies. Candy.  Seasoning and other foods: Mayonnaise. Premade sauces and marinades.  The items listed may not be a complete list. Talk with your dietitian about what dietary choices are right for you.  Summary  The Mediterranean diet includes both food and lifestyle choices.  Eat a variety of fresh fruits and vegetables, beans, nuts, seeds, and whole grains.  Limit the amount of red meat and sweets that you eat.  Talk with your health care provider about whether it is safe for you to drink red wine in moderation. This means 1 glass a day for nonpregnant women and 2 glasses a day for men. A glass of wine equals 5 oz (150 mL).  This information is not intended to replace advice given to you by your health care provider. Make sure you discuss any questions you have with your health care provider.  Document Revised: 08/17/2017 Document Reviewed: 08/10/2017  Elsevier Patient Education © 2020 [employer] Inc.         Exercising to Stay Healthy  To become healthy and stay healthy, it is recommended that you do moderate-intensity and vigorous-intensity exercise. You can tell that you are exercising at a moderate intensity if your  heart starts beating faster and you start breathing faster but can still hold a conversation. You can tell that you are exercising at a vigorous intensity if you are breathing much harder and faster and cannot hold a conversation while exercising.  Exercising regularly is important. It has many health benefits, such as:  Improving overall fitness, flexibility, and endurance.  Increasing bone density.  Helping with weight control.  Decreasing body fat.  Increasing muscle strength.  Reducing stress and tension.  Improving overall health.  How often should I exercise?  Choose an activity that you enjoy, and set realistic goals. Your health care provider can help you make an activity plan that works for you.  Exercise regularly as told by your health care provider. This may include:  Doing strength training two times a week, such as:  Lifting weights.  Using resistance bands.  Push-ups.  Sit-ups.  Yoga.  Doing a certain intensity of exercise for a given amount of time. Choose from these options:  A total of 150 minutes of moderate-intensity exercise every week.  A total of 75 minutes of vigorous-intensity exercise every week.  A mix of moderate-intensity and vigorous-intensity exercise every week.  Children, pregnant women, people who have not exercised regularly, people who are overweight, and older adults may need to talk with a health care provider about what activities are safe to do. If you have a medical condition, be sure to talk with your health care provider before you start a new exercise program.  What are some exercise ideas?    Moderate-intensity exercise ideas include:  Walking 1 mile (1.6 km) in about 15 minutes.  Biking.  Hiking.  Golfing.  Dancing.  Water aerobics.  Vigorous-intensity exercise ideas include:  Walking 4.5 miles (7.2 km) or more in about 1 hour.  Jogging or running 5 miles (8 km) in about 1 hour.  Biking 10 miles (16.1 km) or more in about 1 hour.  Lap swimming.  Roller-skating or in-line  skating.  Cross-country skiing.  Vigorous competitive sports, such as football, basketball, and soccer.  Jumping rope.  Aerobic dancing.  What are some everyday activities that can help me to get exercise?  Yard work, such as:  Pushing a .  Raking and bagging leaves.  Washing your car.  Pushing a stroller.  Shoveling snow.  Gardening.  Washing windows or floors.  How can I be more active in my day-to-day activities?  Use stairs instead of an elevator.  Take a walk during your lunch break.  If you drive, park your car farther away from your work or school.  If you take public transportation, get off one stop early and walk the rest of the way.  Stand up or walk around during all of your indoor phone calls.  Get up, stretch, and walk around every 30 minutes throughout the day.  Enjoy exercise with a friend. Support to continue exercising will help you keep a regular routine of activity.  What guidelines can I follow while exercising?  Before you start a new exercise program, talk with your health care provider.  Do not exercise so much that you hurt yourself, feel dizzy, or get very short of breath.  Wear comfortable clothes and wear shoes with good support.  Drink plenty of water while you exercise to prevent dehydration or heat stroke.  Work out until your breathing and your heartbeat get faster.  Where to find more information  [place] Department of Health and Human Services: www.hhs.gov  Centers for Disease Control and Prevention (CDC): www.cdc.gov  Summary  Exercising regularly is important. It will improve your overall fitness, flexibility, and endurance.  Regular exercise also will improve your overall health. It can help you control your weight, reduce stress, and improve your bone density.  Do not exercise so much that you hurt yourself, feel dizzy, or get very short of breath.  Before you start a new exercise program, talk with your health care provider.  This information is not intended to replace  advice given to you by your health care provider. Make sure you discuss any questions you have with your health care provider.  Document Revised: 11/30/2018 Document Reviewed: 11/08/2018  Elsevier Patient Education © 2021 [employer]           Mindfulness-Based Stress Reduction  Mindfulness-based stress reduction (MBSR) is a program that helps people learn to practice mindfulness. Mindfulness is the practice of intentionally paying attention to the present moment. It can be learned and practiced through techniques such as education, breathing exercises, meditation, and yoga. MBSR includes several mindfulness techniques in one program.  MBSR works best when you understand the treatment, are willing to try new things, and can commit to spending time practicing what you learn. MBSR training may include learning about:  How your emotions, thoughts, and reactions affect your body.  New ways to respond to things that cause negative thoughts to start (triggers).  How to notice your thoughts and let go of them.  Practicing awareness of everyday things that you normally do without thinking.  The techniques and goals of different types of meditation.  What are the benefits of MBSR?  MBSR can have many benefits, which include helping you to:  Develop self-awareness. This refers to knowing and understanding yourself.  Learn skills and attitudes that help you to participate in your own health care.  Learn new ways to care for yourself.  Be more accepting about how things are, and let things go.  Be less judgmental and approach things with an open mind.  Be patient with yourself and trust yourself more.  MBSR has also been shown to:  Reduce negative emotions, such as depression and anxiety.  Improve memory and focus.  Change how you sense and approach pain.  Boost your body's ability to fight infections.  Help you connect better with other people.  Improve your sense of well-being.  Follow these instructions at home:       Find  a local in-person or online MBSR program.  Set aside some time regularly for mindfulness practice.  Find a mindfulness practice that works best for you. This may include one or more of the following:  Meditation. Meditation involves focusing your mind on a certain thought or activity.  Breathing awareness exercises. These help you to stay present by focusing on your breath.  Body scan. For this practice, you lie down and pay attention to each part of your body from head to toe. You can identify tension and soreness and intentionally relax parts of your body.  Yoga. Yoga involves stretching and breathing, and it can improve your ability to move and be flexible. It can also provide an experience of testing your body's limits, which can help you release stress.  Mindful eating. This way of eating involves focusing on the taste, texture, color, and smell of each bite of food. Because this slows down eating and helps you feel full sooner, it can be an important part of a weight-loss plan.  Find a podcast or recording that provides guidance for breathing awareness, body scan, or meditation exercises. You can listen to these any time when you have a free moment to rest without distractions.  Follow your treatment plan as told by your health care provider. This may include taking regular medicines and making changes to your diet or lifestyle as recommended.  How to practice mindfulness  To do a basic awareness exercise:  Find a comfortable place to sit.  Pay attention to the present moment. Observe your thoughts, feelings, and surroundings just as they are.  Avoid placing judgment on yourself, your feelings, or your surroundings. Make note of any judgment that comes up, and let it go.  Your mind may wander, and that is okay. Make note of when your thoughts drift, and return your attention to the present moment.  To do basic mindfulness meditation:  Find a comfortable place to sit. This may include a stable chair or a firm  floor cushion.  Sit upright with your back straight. Let your arms fall next to your side with your hands resting on your legs.  If sitting in a chair, rest your feet flat on the floor.  If sitting on a cushion, cross your legs in front of you.  Keep your head in a neutral position with your chin dropped slightly. Relax your jaw and rest the tip of your tongue on the roof of your mouth. Drop your gaze to the floor. You can close your eyes if you like.  Breathe normally and pay attention to your breath. Feel the air moving in and out of your nose. Feel your belly expanding and relaxing with each breath.  Your mind may wander, and that is okay. Make note of when your thoughts drift, and return your attention to your breath.  Avoid placing judgment on yourself, your feelings, or your surroundings. Make note of any judgment or feelings that come up, let them go, and bring your attention back to your breath.  When you are ready, lift your gaze or open your eyes. Pay attention to how your body feels after the meditation.  Where to find more information  You can find more information about MBSR from:  Your health care provider.  Community-based meditation centers or programs.  Programs offered near you.  Summary  Mindfulness-based stress reduction (MBSR) is a program that teaches you how to intentionally pay attention to the present moment. It is used with other treatments to help you cope better with daily stress, emotions, and pain.  MBSR focuses on developing self-awareness, which allows you to respond to life stress without judgment or negative emotions.  MBSR programs may involve learning different mindfulness practices, such as breathing exercises, meditation, yoga, body scan, or mindful eating. Find a mindfulness practice that works best for you, and set aside time for it on a regular basis.  This information is not intended to replace advice given to you by your health care provider. Make sure you discuss any  questions you have with your health care provider.  Document Revised: 02/22/2021 Document Reviewed: 04/26/2018  Elsevier Patient Education © 2021 Elsevier Inc.

## 2022-04-06 ENCOUNTER — OFFICE VISIT (OUTPATIENT)
Dept: INTERNAL MEDICINE | Facility: CLINIC | Age: 57
End: 2022-04-06

## 2022-04-06 VITALS
TEMPERATURE: 97.7 F | WEIGHT: 212 LBS | SYSTOLIC BLOOD PRESSURE: 142 MMHG | OXYGEN SATURATION: 98 % | DIASTOLIC BLOOD PRESSURE: 96 MMHG | HEIGHT: 69 IN | BODY MASS INDEX: 31.4 KG/M2 | HEART RATE: 82 BPM

## 2022-04-06 DIAGNOSIS — N40.0 BENIGN PROSTATIC HYPERPLASIA WITHOUT LOWER URINARY TRACT SYMPTOMS: ICD-10-CM

## 2022-04-06 DIAGNOSIS — E87.1 HYPONATREMIA: ICD-10-CM

## 2022-04-06 DIAGNOSIS — Z00.00 ANNUAL PHYSICAL EXAM: Primary | ICD-10-CM

## 2022-04-06 DIAGNOSIS — I10 ESSENTIAL HYPERTENSION: ICD-10-CM

## 2022-04-06 DIAGNOSIS — E66.09 CLASS 1 OBESITY DUE TO EXCESS CALORIES WITHOUT SERIOUS COMORBIDITY WITH BODY MASS INDEX (BMI) OF 33.0 TO 33.9 IN ADULT: ICD-10-CM

## 2022-04-06 DIAGNOSIS — K08.9 CHRONIC DENTAL PAIN: ICD-10-CM

## 2022-04-06 DIAGNOSIS — E78.00 PURE HYPERCHOLESTEROLEMIA: ICD-10-CM

## 2022-04-06 DIAGNOSIS — G89.29 CHRONIC DENTAL PAIN: ICD-10-CM

## 2022-04-06 PROBLEM — K21.9 ESOPHAGEAL REFLUX: Status: ACTIVE | Noted: 2022-04-06

## 2022-04-06 PROBLEM — H93.19 TINNITUS: Status: ACTIVE | Noted: 2022-04-06

## 2022-04-06 PROBLEM — R60.9 EDEMA: Status: ACTIVE | Noted: 2022-04-06

## 2022-04-06 PROBLEM — N43.3 HYDROCELE: Status: ACTIVE | Noted: 2022-04-06

## 2022-04-06 LAB
ALBUMIN SERPL-MCNC: 4.8 G/DL (ref 3.5–5.2)
ALBUMIN/GLOB SERPL: 1.8 G/DL
ALP SERPL-CCNC: 60 U/L (ref 39–117)
ALT SERPL W P-5'-P-CCNC: 24 U/L (ref 1–41)
ANION GAP SERPL CALCULATED.3IONS-SCNC: 11.8 MMOL/L (ref 5–15)
AST SERPL-CCNC: 18 U/L (ref 1–40)
BASOPHILS # BLD AUTO: 0.03 10*3/MM3 (ref 0–0.2)
BASOPHILS NFR BLD AUTO: 0.5 % (ref 0–1.5)
BILIRUB SERPL-MCNC: 0.4 MG/DL (ref 0–1.2)
BUN SERPL-MCNC: 9 MG/DL (ref 6–20)
BUN/CREAT SERPL: 11 (ref 7–25)
CALCIUM SPEC-SCNC: 9.8 MG/DL (ref 8.6–10.5)
CHLORIDE SERPL-SCNC: 95 MMOL/L (ref 98–107)
CO2 SERPL-SCNC: 24.2 MMOL/L (ref 22–29)
CREAT SERPL-MCNC: 0.82 MG/DL (ref 0.76–1.27)
DEPRECATED RDW RBC AUTO: 42.7 FL (ref 37–54)
EGFRCR SERPLBLD CKD-EPI 2021: 103.7 ML/MIN/1.73
EOSINOPHIL # BLD AUTO: 0.2 10*3/MM3 (ref 0–0.4)
EOSINOPHIL NFR BLD AUTO: 3.2 % (ref 0.3–6.2)
ERYTHROCYTE [DISTWIDTH] IN BLOOD BY AUTOMATED COUNT: 12.1 % (ref 12.3–15.4)
GLOBULIN UR ELPH-MCNC: 2.6 GM/DL
GLUCOSE SERPL-MCNC: 80 MG/DL (ref 65–99)
HCT VFR BLD AUTO: 45.1 % (ref 37.5–51)
HGB BLD-MCNC: 15.1 G/DL (ref 13–17.7)
IMM GRANULOCYTES # BLD AUTO: 0.02 10*3/MM3 (ref 0–0.05)
IMM GRANULOCYTES NFR BLD AUTO: 0.3 % (ref 0–0.5)
LYMPHOCYTES # BLD AUTO: 2.17 10*3/MM3 (ref 0.7–3.1)
LYMPHOCYTES NFR BLD AUTO: 35.2 % (ref 19.6–45.3)
MCH RBC QN AUTO: 31.5 PG (ref 26.6–33)
MCHC RBC AUTO-ENTMCNC: 33.5 G/DL (ref 31.5–35.7)
MCV RBC AUTO: 94 FL (ref 79–97)
MONOCYTES # BLD AUTO: 0.64 10*3/MM3 (ref 0.1–0.9)
MONOCYTES NFR BLD AUTO: 10.4 % (ref 5–12)
NEUTROPHILS NFR BLD AUTO: 3.1 10*3/MM3 (ref 1.7–7)
NEUTROPHILS NFR BLD AUTO: 50.4 % (ref 42.7–76)
NRBC BLD AUTO-RTO: 0.2 /100 WBC (ref 0–0.2)
PLATELET # BLD AUTO: 265 10*3/MM3 (ref 140–450)
PMV BLD AUTO: 9.3 FL (ref 6–12)
POTASSIUM SERPL-SCNC: 4.5 MMOL/L (ref 3.5–5.2)
PROT SERPL-MCNC: 7.4 G/DL (ref 6–8.5)
RBC # BLD AUTO: 4.8 10*6/MM3 (ref 4.14–5.8)
SODIUM SERPL-SCNC: 131 MMOL/L (ref 136–145)
TSH SERPL DL<=0.05 MIU/L-ACNC: 2.84 UIU/ML (ref 0.27–4.2)
WBC NRBC COR # BLD: 6.16 10*3/MM3 (ref 3.4–10.8)

## 2022-04-06 PROCEDURE — 80050 GENERAL HEALTH PANEL: CPT | Performed by: STUDENT IN AN ORGANIZED HEALTH CARE EDUCATION/TRAINING PROGRAM

## 2022-04-06 PROCEDURE — 99396 PREV VISIT EST AGE 40-64: CPT | Performed by: STUDENT IN AN ORGANIZED HEALTH CARE EDUCATION/TRAINING PROGRAM

## 2022-04-06 RX ORDER — SPIRONOLACTONE 25 MG/1
25 TABLET ORAL DAILY
Qty: 90 TABLET | Refills: 3 | Status: SHIPPED | OUTPATIENT
Start: 2022-04-06 | End: 2022-08-01

## 2022-04-06 NOTE — PROGRESS NOTES
Chief Complaint  Annual Exam    Subjective          Jose Luis Casas presents to [facility]  History of Present Illness    Lost weight since January by watching what he eats (portion control, lean proteins, more vegetables) as well as watching sodium in diet.    Reports life stress (loss of family, caring for 90 year old father in law who is blind and has poor hearing, mother-in-law passed away in interim after 5 weeks in ICU).    Due to new responsibilities, exercise is limited.  Does try to walk several times a week.    Had painful swelling with amlodipine.  Had electrolyte problems with HCTZ.(chronic hyponatremia at baseline).    Previous history of left sided dental pain, which has since resolved.  Seen by dentist, and was reassured.      PHQ-9 Depression Screening  Little interest or pleasure in doing things? 0-->not at all   Feeling down, depressed, or hopeless? 0-->not at all   Trouble falling or staying asleep, or sleeping too much?     Feeling tired or having little energy?     Poor appetite or overeating?     Feeling bad about yourself - or that you are a failure or have let yourself or your family down?     Trouble concentrating on things, such as reading the newspaper or watching television?     Moving or speaking so slowly that other people could have noticed? Or the opposite - being so fidgety or restless that you have been moving around a lot more than usual?     Thoughts that you would be better off dead, or of hurting yourself in some way?     PHQ-9 Total Score 0   If you checked off any problems, how difficult have these problems made it for you to do your work, take care of things at home, or get along with other people?             Current Outpatient Medications   Medication Instructions   • atorvastatin (LIPITOR) 40 mg, Oral, Daily   • Cholecalciferol 125 MCG (5000 UT) tablet Vitamin D3 5,000 unit oral tablet take 1 tablet by oral route daily    "Suspended   • hydrALAZINE (APRESOLINE) 10 mg, Oral, 3 Times Daily   • losartan (COZAAR) 50 mg, Oral, 2 Times Daily   • Mirabegron ER (MYRBETRIQ) 50 mg, Oral, Daily   • omeprazole (PRILOSEC) 20 mg, Oral, Daily   • spironolactone (ALDACTONE) 25 mg, Oral, Daily   • tamsulosin (FLOMAX) 0.4 mg, Oral, Daily       The following portions of the patient's history were reviewed and updated as appropriate: allergies, current medications, past family history, past medical history, past social history, past surgical history, and problem list.    Objective   Vital Signs:   /96 (BP Location: Right arm, Patient Position: Sitting, Cuff Size: Adult)   Pulse 82   Temp 97.7 °F (36.5 °C) (Temporal)   Ht 175.3 cm (69\")   Wt 96.2 kg (212 lb)   SpO2 98%   BMI 31.31 kg/m²     Wt Readings from Last 3 Encounters:   04/06/22 96.2 kg (212 lb)   01/12/22 103 kg (226 lb)   10/07/21 102 kg (225 lb)     BP Readings from Last 3 Encounters:   04/06/22 142/96   01/12/22 144/85   10/07/21 163/97     Physical Exam   Appearance: No acute distress, well-nourished  Head: normocephalic, atraumatic  Eyes: no scleral icterus, no conjunctival injection  Ears, Nose, and Throat: external ears normal, nares patent, moist mucous membranes, filling noted in multiple teeth but oropharynx otherwise unremarkable  Cardiovascular: regular rate and rhythm. no murmurs, rubs, or gallops. 1+ JUANCARLOS edema, trace RLE edema  Respiratory: breathing comfortably, symmetric chest rise, clear to auscultation bilaterally. No wheezes, rales, or rhonchi.  GI: soft, NTTP, no masses or HSM  Neuro: alert and oriented  Psych: normal mood and affect     Result Review :   The following data was reviewed by: Sid Arango MD on 04/06/2022:  Common labs    Common Labsle 11/8/21 12/20/21 1/12/22 1/12/22      1252 1252   Glucose 94 93  88   BUN 8 12  13   Creatinine 0.64 (A) 0.88  0.89   eGFR Non African Am 130 90  89   Sodium 126 (A) 132 (A)  132 (A)   Potassium 4.9 4.5  4.8 "   Chloride 88 (A) 98  98   Calcium 10.3 9.6  9.9   Total Cholesterol   189    Triglycerides   139    HDL Cholesterol   47    LDL Cholesterol    117 (A)    (A) Abnormal value                   Lab Results   Component Value Date    COVID19 NOT DETECTED 12/05/2020    INR 0.93 (L) 03/23/2021       Procedures        Assessment and Plan    Diagnoses and all orders for this visit:    1. Annual physical exam (Primary)  -     CBC & Differential  -     Comprehensive Metabolic Panel  -     TSH    2. Pure hypercholesterolemia    3. Essential hypertension  -     Comprehensive Metabolic Panel  -     spironolactone (Aldactone) 25 MG tablet; Take 1 tablet by mouth Daily.  Dispense: 90 tablet; Refill: 3    4. Class 1 obesity due to excess calories without serious comorbidity with body mass index (BMI) of 33.0 to 33.9 in adult    5. Benign prostatic hyperplasia without lower urinary tract symptoms    6. Hyponatremia  -     Comprehensive Metabolic Panel    7. Chronic dental pain      HTN:  -BP log above goal of < 130/80  -will avoid diuretics due to his chronic hyponatremia; didn't tolerate amlodipine (leg swelling)  -will add spironolactone to regimen  -low sodium dietary teaching today    HLD:  -on statin    BPH:  -on flomax    Dental pain:  -resolved    Obesity:  -improved with improvement of diet  -encouraged to keep up the good work  -recommended at least 2.5 hrs moderate exercise a week    Health maintenance:  -recommended at least 2.5 hrs moderate exercise a week  -nutritional counseling on the elements of a heart healthy diet as well as low sodium diet    There are no discontinued medications.       Follow Up   Return in about 3 months (around 7/6/2022) for HTN.  Patient was given instructions and counseling regarding his condition or for health maintenance advice. Please see specific information pulled into the AVS if appropriate.       Sid Arango MD  04/06/22  17:34 EDT

## 2022-04-26 ENCOUNTER — TELEPHONE (OUTPATIENT)
Dept: INTERNAL MEDICINE | Facility: CLINIC | Age: 57
End: 2022-04-26

## 2022-04-26 DIAGNOSIS — N32.81 OVERACTIVE BLADDER: Primary | ICD-10-CM

## 2022-04-26 NOTE — TELEPHONE ENCOUNTER
PA REQUIRED Mirabegron ER (Myrbetriq) 50 MG tablet sustained-release 24 hour 24 hr tablet (01/12/2022)

## 2022-05-09 RX ORDER — FESOTERODINE FUMARATE 4 MG/1
4 TABLET, EXTENDED RELEASE ORAL
Qty: 90 TABLET | Refills: 2 | Status: SHIPPED | OUTPATIENT
Start: 2022-05-09 | End: 2022-05-13

## 2022-05-12 ENCOUNTER — PATIENT MESSAGE (OUTPATIENT)
Dept: INTERNAL MEDICINE | Facility: CLINIC | Age: 57
End: 2022-05-12

## 2022-05-16 NOTE — TELEPHONE ENCOUNTER
Marielena Fontaine 5/16/2022 3:20 PM EDT      ----- Message -----  From: Jose Luis Casas  Sent: 5/13/2022 3:15 PM EDT  To: [facility]  Subject: Toviaz (new prescription)     The pharmacy had not received it yet, but maybe I checked too early. Please make sure your staff has my files updated to reflect that [facility] now our [facility]. Their number is [phone].     Thank you again!   Eddie

## 2022-05-17 ENCOUNTER — TELEPHONE (OUTPATIENT)
Dept: INTERNAL MEDICINE | Facility: CLINIC | Age: 57
End: 2022-05-17

## 2022-05-17 NOTE — TELEPHONE ENCOUNTER
CARE COORDINATION - SCAN - CLARIFICATION GEMTESA 75MG TABS (05/17/2022)    ATTEMPTED TO CONTACT PT PER PROVIDER'S INSTRUCTIONS     NO ANSWER     LEFT VOICEMAIL WITH INSTRUCTIONS TO RETURN CALL TO OFFICE AT [phone]    OK FOR HUB TO ADVISE/READ   Sid Arango MD 4 hours ago (9:12 AM)        It is okay to substitute gemtesa 75 m for vibegron 75 mg.  Please contact pharmacy to clarify.

## 2022-05-18 NOTE — TELEPHONE ENCOUNTER
PA NOT REQUIRED.  SEE ATTACHED FAX  REFERRAL/PRE-AUTH CSN - SCAN - [facility]/ PA NOT REQUIRED (05/17/2022)

## 2022-08-01 ENCOUNTER — OFFICE VISIT (OUTPATIENT)
Dept: INTERNAL MEDICINE | Facility: CLINIC | Age: 57
End: 2022-08-01

## 2022-08-01 VITALS
HEIGHT: 69 IN | OXYGEN SATURATION: 97 % | BODY MASS INDEX: 31.99 KG/M2 | WEIGHT: 216 LBS | HEART RATE: 86 BPM | TEMPERATURE: 97.7 F | DIASTOLIC BLOOD PRESSURE: 85 MMHG | SYSTOLIC BLOOD PRESSURE: 144 MMHG

## 2022-08-01 DIAGNOSIS — I10 ESSENTIAL HYPERTENSION: Primary | ICD-10-CM

## 2022-08-01 DIAGNOSIS — N62 GYNECOMASTIA, MALE: ICD-10-CM

## 2022-08-01 DIAGNOSIS — N40.0 BENIGN PROSTATIC HYPERPLASIA WITHOUT LOWER URINARY TRACT SYMPTOMS: ICD-10-CM

## 2022-08-01 DIAGNOSIS — E87.1 HYPONATREMIA: ICD-10-CM

## 2022-08-01 DIAGNOSIS — Z23 NEED FOR VACCINATION: ICD-10-CM

## 2022-08-01 PROCEDURE — 90471 IMMUNIZATION ADMIN: CPT | Performed by: STUDENT IN AN ORGANIZED HEALTH CARE EDUCATION/TRAINING PROGRAM

## 2022-08-01 PROCEDURE — 99214 OFFICE O/P EST MOD 30 MIN: CPT | Performed by: STUDENT IN AN ORGANIZED HEALTH CARE EDUCATION/TRAINING PROGRAM

## 2022-08-01 PROCEDURE — 80053 COMPREHEN METABOLIC PANEL: CPT | Performed by: STUDENT IN AN ORGANIZED HEALTH CARE EDUCATION/TRAINING PROGRAM

## 2022-08-01 PROCEDURE — 90715 TDAP VACCINE 7 YRS/> IM: CPT | Performed by: STUDENT IN AN ORGANIZED HEALTH CARE EDUCATION/TRAINING PROGRAM

## 2022-08-01 RX ORDER — HYDRALAZINE HYDROCHLORIDE 10 MG/1
10 TABLET, FILM COATED ORAL 2 TIMES DAILY
Qty: 270 TABLET | Refills: 3 | Status: SHIPPED | OUTPATIENT
Start: 2022-08-01 | End: 2022-08-01 | Stop reason: SDUPTHER

## 2022-08-01 RX ORDER — TAMSULOSIN HYDROCHLORIDE 0.4 MG/1
1 CAPSULE ORAL DAILY
Qty: 90 CAPSULE | Refills: 3 | Status: SHIPPED | OUTPATIENT
Start: 2022-08-01 | End: 2022-11-02 | Stop reason: SDUPTHER

## 2022-08-01 RX ORDER — LOSARTAN POTASSIUM 50 MG/1
50 TABLET ORAL 2 TIMES DAILY
Qty: 180 TABLET | Refills: 3 | Status: SHIPPED | OUTPATIENT
Start: 2022-08-01 | End: 2022-11-02 | Stop reason: SDUPTHER

## 2022-08-01 RX ORDER — CARVEDILOL 6.25 MG/1
6.25 TABLET ORAL 2 TIMES DAILY WITH MEALS
Qty: 180 TABLET | Refills: 2 | Status: SHIPPED | OUTPATIENT
Start: 2022-08-01 | End: 2022-11-02 | Stop reason: SDUPTHER

## 2022-08-01 RX ORDER — EPLERENONE 50 MG/1
50 TABLET, FILM COATED ORAL DAILY
Qty: 90 TABLET | Refills: 2 | Status: SHIPPED | OUTPATIENT
Start: 2022-08-01 | End: 2022-11-02 | Stop reason: SDUPTHER

## 2022-08-01 RX ORDER — HYDRALAZINE HYDROCHLORIDE 10 MG/1
10 TABLET, FILM COATED ORAL 2 TIMES DAILY
Qty: 270 TABLET | Refills: 3 | Status: SHIPPED | OUTPATIENT
Start: 2022-08-01 | End: 2022-11-02 | Stop reason: SDUPTHER

## 2022-08-01 NOTE — PROGRESS NOTES
"Chief Complaint  Hypertension (3 month follow-up), Heartburn (Patient is wanting to increase his Omeprazole to either 40 mg or BID. Patient is sometimes needing more than 1 pill. ), and Medication Problem (Hydralazine is the medication of concern. Patient is having a lot of side effects. )    Subjective          Jose Luis Casas presents to [facility]  History of Present Illness    Here with wife.    Sometimes misses third dose of hydralazine.  Reports having frequent bowel movements after second dose of hydralazine.  Reports having sore breasts since starting spironolactone.    Reports having \"breathing issues\" due to stuffy nose that he is concerned could be related to hydralazine.  Reports light headed at times, but denies syncope.    Brought BP log today (scanned in separately).    Reports doing well in terms of urination on flomax.      Current Outpatient Medications   Medication Instructions   • atorvastatin (LIPITOR) 40 mg, Oral, Daily   • carvedilol (COREG) 6.25 mg, Oral, 2 Times Daily With Meals   • Cholecalciferol 125 MCG (5000 UT) tablet Vitamin D3 5,000 unit oral tablet take 1 tablet by oral route daily   Suspended   • eplerenone (INSPRA) 50 mg, Oral, Daily   • hydrALAZINE (APRESOLINE) 10 mg, Oral, 2 Times Daily   • losartan (COZAAR) 50 mg, Oral, 2 Times Daily   • omeprazole (PRILOSEC) 20 mg, Oral, Daily   • tamsulosin (FLOMAX) 0.4 mg, Oral, Daily   • Vibegron 75 mg, Oral, Daily       The following portions of the patient's history were reviewed and updated as appropriate: allergies, current medications, past family history, past medical history, past social history, past surgical history, and problem list.    Objective   Vital Signs:   /85 (BP Location: Left arm, Patient Position: Sitting, Cuff Size: Adult)   Pulse 86   Temp 97.7 °F (36.5 °C) (Temporal)   Ht 175.3 cm (69\")   Wt 98 kg (216 lb)   SpO2 97%   BMI 31.90 kg/m²     Wt Readings from Last " 3 Encounters:   08/01/22 98 kg (216 lb)   04/06/22 96.2 kg (212 lb)   01/12/22 103 kg (226 lb)     BP Readings from Last 3 Encounters:   08/01/22 144/85   04/06/22 142/96   01/12/22 144/85     Physical Exam  Vitals reviewed.   Constitutional:       Appearance: Normal appearance.   HENT:      Head: Normocephalic and atraumatic.   Eyes:      Conjunctiva/sclera: Conjunctivae normal.   Cardiovascular:      Rate and Rhythm: Normal rate and regular rhythm.      Pulses: Normal pulses.      Heart sounds: Normal heart sounds.      Comments: Bilateral gynecomastia noted  Pulmonary:      Effort: Pulmonary effort is normal.      Breath sounds: Normal breath sounds.   Abdominal:      Palpations: Abdomen is soft. There is no mass.      Tenderness: There is no abdominal tenderness.   Musculoskeletal:      Right lower leg: Edema present.      Left lower leg: Edema present.      Comments: 1+ Edema bilateral lower extremities   Skin:     General: Skin is warm and dry.   Neurological:      General: No focal deficit present.      Mental Status: He is alert. Mental status is at baseline.   Psychiatric:         Mood and Affect: Mood normal.         Behavior: Behavior normal.         Thought Content: Thought content normal.         Judgment: Judgment normal.         Result Review :   The following data was reviewed by: Sid Arango MD on 08/01/2022:  Common labs    Common Labsle 12/20/21 1/12/22 1/12/22 4/6/22 4/6/22     1252 1252 1128 1128   Glucose 93  88  80   BUN 12  13  9   Creatinine 0.88  0.89  0.82   eGFR Non African Am 90  89     Sodium 132 (A)  132 (A)  131 (A)   Potassium 4.5  4.8  4.5   Chloride 98  98  95 (A)   Calcium 9.6  9.9  9.8   Albumin     4.80   Total Bilirubin     0.4   Alkaline Phosphatase     60   AST (SGOT)     18   ALT (SGPT)     24   WBC    6.16    Hemoglobin    15.1    Hematocrit    45.1    Platelets    265    Total Cholesterol  189      Triglycerides  139      HDL Cholesterol  47      LDL Cholesterol   117  (A)      (A) Abnormal value                   Lab Results   Component Value Date    COVID19 NOT DETECTED 12/05/2020    INR 0.93 (L) 03/23/2021       Procedures        Assessment and Plan    Diagnoses and all orders for this visit:    1. Essential hypertension (Primary)  -     Comprehensive Metabolic Panel  -     Discontinue: hydrALAZINE (APRESOLINE) 10 MG tablet; Take 1 tablet by mouth 2 (Two) Times a Day.  Dispense: 270 tablet; Refill: 3  -     carvedilol (Coreg) 6.25 MG tablet; Take 1 tablet by mouth 2 (Two) Times a Day With Meals.  Dispense: 180 tablet; Refill: 2  -     eplerenone (INSPRA) 50 MG tablet; Take 1 tablet by mouth Daily.  Dispense: 90 tablet; Refill: 2  -     losartan (COZAAR) 50 MG tablet; Take 1 tablet by mouth 2 (Two) Times a Day.  Dispense: 180 tablet; Refill: 3  -     hydrALAZINE (APRESOLINE) 10 MG tablet; Take 1 tablet by mouth 2 (Two) Times a Day.  Dispense: 270 tablet; Refill: 3    2. Benign prostatic hyperplasia without lower urinary tract symptoms  -     tamsulosin (FLOMAX) 0.4 MG capsule 24 hr capsule; Take 1 capsule by mouth Daily.  Dispense: 90 capsule; Refill: 3    3. Hyponatremia  -     Comprehensive Metabolic Panel    4. Need for vaccination  -     Tdap Vaccine Greater Than or Equal To 6yo IM    5. Gynecomastia, male      HTN:  -BP above goal of < 130/80  -will switch to eplerenone given gynecomastia  -will switch hydralazine to BID ,and start coreg  -BP log reviewed  -reviewed low sodium diet    Immunization:  Discussed risks/benefits to vaccination, reviewed components of the vaccine, discussed VIS, discussed informed consent, informed consent obtained. Patient/Parent was allowed to accept or refuse vaccine. Questions answered to satisfactory state of patient/parent. We reviewed typical age appropriate and seasonally appropriate vaccinations. Reviewed immunization history and updated state vaccination form as needed. Patient/Parent was counseled on the above  vaccines.      Medications Discontinued During This Encounter   Medication Reason   • hydrALAZINE (APRESOLINE) 10 MG tablet Reorder   • spironolactone (Aldactone) 25 MG tablet    • losartan (COZAAR) 50 MG tablet Reorder   • tamsulosin (FLOMAX) 0.4 MG capsule 24 hr capsule Reorder   • hydrALAZINE (APRESOLINE) 10 MG tablet Reorder          Follow Up   Return in about 3 months (around 11/1/2022) for HTN.  Patient was given instructions and counseling regarding his condition or for health maintenance advice. Please see specific information pulled into the AVS if appropriate.       Sid Arango MD  08/01/22  10:48 EDT

## 2022-08-02 LAB
ALBUMIN SERPL-MCNC: 4.6 G/DL (ref 3.5–5.2)
ALBUMIN/GLOB SERPL: 2.1 G/DL
ALP SERPL-CCNC: 51 U/L (ref 39–117)
ALT SERPL W P-5'-P-CCNC: 22 U/L (ref 1–41)
ANION GAP SERPL CALCULATED.3IONS-SCNC: 14.1 MMOL/L (ref 5–15)
AST SERPL-CCNC: 25 U/L (ref 1–40)
BILIRUB SERPL-MCNC: 0.6 MG/DL (ref 0–1.2)
BUN SERPL-MCNC: 11 MG/DL (ref 6–20)
BUN/CREAT SERPL: 12.2 (ref 7–25)
CALCIUM SPEC-SCNC: 9.8 MG/DL (ref 8.6–10.5)
CHLORIDE SERPL-SCNC: 93 MMOL/L (ref 98–107)
CO2 SERPL-SCNC: 23.9 MMOL/L (ref 22–29)
CREAT SERPL-MCNC: 0.9 MG/DL (ref 0.76–1.27)
EGFRCR SERPLBLD CKD-EPI 2021: 100.9 ML/MIN/1.73
GLOBULIN UR ELPH-MCNC: 2.2 GM/DL
GLUCOSE SERPL-MCNC: 86 MG/DL (ref 65–99)
POTASSIUM SERPL-SCNC: 5.1 MMOL/L (ref 3.5–5.2)
PROT SERPL-MCNC: 6.8 G/DL (ref 6–8.5)
SODIUM SERPL-SCNC: 131 MMOL/L (ref 136–145)

## 2022-11-02 ENCOUNTER — OFFICE VISIT (OUTPATIENT)
Dept: INTERNAL MEDICINE | Facility: CLINIC | Age: 57
End: 2022-11-02

## 2022-11-02 VITALS
DIASTOLIC BLOOD PRESSURE: 84 MMHG | RESPIRATION RATE: 14 BRPM | HEIGHT: 69 IN | WEIGHT: 225.4 LBS | HEART RATE: 63 BPM | OXYGEN SATURATION: 96 % | SYSTOLIC BLOOD PRESSURE: 125 MMHG | TEMPERATURE: 98 F | BODY MASS INDEX: 33.38 KG/M2

## 2022-11-02 DIAGNOSIS — E78.00 PURE HYPERCHOLESTEROLEMIA: ICD-10-CM

## 2022-11-02 DIAGNOSIS — K44.9 HIATAL HERNIA: ICD-10-CM

## 2022-11-02 DIAGNOSIS — N40.0 BENIGN PROSTATIC HYPERPLASIA WITHOUT LOWER URINARY TRACT SYMPTOMS: ICD-10-CM

## 2022-11-02 DIAGNOSIS — N32.81 OVERACTIVE BLADDER: ICD-10-CM

## 2022-11-02 DIAGNOSIS — I10 ESSENTIAL HYPERTENSION: Primary | ICD-10-CM

## 2022-11-02 DIAGNOSIS — E87.1 HYPONATREMIA: ICD-10-CM

## 2022-11-02 PROCEDURE — 99214 OFFICE O/P EST MOD 30 MIN: CPT | Performed by: STUDENT IN AN ORGANIZED HEALTH CARE EDUCATION/TRAINING PROGRAM

## 2022-11-02 PROCEDURE — 80048 BASIC METABOLIC PNL TOTAL CA: CPT | Performed by: STUDENT IN AN ORGANIZED HEALTH CARE EDUCATION/TRAINING PROGRAM

## 2022-11-02 PROCEDURE — 80061 LIPID PANEL: CPT | Performed by: STUDENT IN AN ORGANIZED HEALTH CARE EDUCATION/TRAINING PROGRAM

## 2022-11-02 RX ORDER — LOSARTAN POTASSIUM 50 MG/1
50 TABLET ORAL 2 TIMES DAILY
Qty: 180 TABLET | Refills: 3 | Status: SHIPPED | OUTPATIENT
Start: 2022-11-02

## 2022-11-02 RX ORDER — CARVEDILOL 6.25 MG/1
6.25 TABLET ORAL 2 TIMES DAILY WITH MEALS
Qty: 180 TABLET | Refills: 2 | Status: SHIPPED | OUTPATIENT
Start: 2022-11-02 | End: 2023-03-27

## 2022-11-02 RX ORDER — HYDRALAZINE HYDROCHLORIDE 10 MG/1
10 TABLET, FILM COATED ORAL 2 TIMES DAILY
Qty: 180 TABLET | Refills: 3 | Status: SHIPPED | OUTPATIENT
Start: 2022-11-02

## 2022-11-02 RX ORDER — ATORVASTATIN CALCIUM 40 MG/1
40 TABLET, FILM COATED ORAL DAILY
Qty: 90 TABLET | Refills: 3 | Status: SHIPPED | OUTPATIENT
Start: 2022-11-02

## 2022-11-02 RX ORDER — FLUTICASONE PROPIONATE 50 MCG
2 SPRAY, SUSPENSION (ML) NASAL DAILY
COMMUNITY

## 2022-11-02 RX ORDER — TAMSULOSIN HYDROCHLORIDE 0.4 MG/1
1 CAPSULE ORAL DAILY
Qty: 90 CAPSULE | Refills: 3 | Status: SHIPPED | OUTPATIENT
Start: 2022-11-02

## 2022-11-02 RX ORDER — EPLERENONE 50 MG/1
50 TABLET, FILM COATED ORAL DAILY
Qty: 90 TABLET | Refills: 2 | Status: SHIPPED | OUTPATIENT
Start: 2022-11-02

## 2022-11-02 RX ORDER — OMEPRAZOLE 20 MG/1
20 CAPSULE, DELAYED RELEASE ORAL DAILY
Qty: 90 CAPSULE | Refills: 3 | Status: SHIPPED | OUTPATIENT
Start: 2022-11-02

## 2022-11-02 NOTE — PROGRESS NOTES
"Chief Complaint  Hypertension    Subjective          Jose Luis Casas presents to [facility]  History of Present Illness    Here with BP log today that is at goal.    Has gotten bivalent covid shot and flu shot in interim.  Compliant with low sodium diet.  Requesting refills on medicines.    Current Outpatient Medications   Medication Instructions   • atorvastatin (LIPITOR) 40 mg, Oral, Daily   • carvedilol (COREG) 6.25 mg, Oral, 2 Times Daily With Meals   • Cholecalciferol 125 mcg, Oral, Daily   • eplerenone (INSPRA) 50 mg, Oral, Daily   • fluticasone (FLONASE) 50 MCG/ACT nasal spray 2 sprays, Nasal, Daily   • hydrALAZINE (APRESOLINE) 10 mg, Oral, 2 Times Daily   • losartan (COZAAR) 50 mg, Oral, 2 Times Daily   • omeprazole (PRILOSEC) 20 mg, Oral, Daily   • tamsulosin (FLOMAX) 0.4 mg, Oral, Daily   • Vibegron 75 mg, Oral, Daily       The following portions of the patient's history were reviewed and updated as appropriate: allergies, current medications, past family history, past medical history, past social history, past surgical history, and problem list.    Objective   Vital Signs:   /84   Pulse 63   Temp 98 °F (36.7 °C) (Temporal)   Resp 14   Ht 175.3 cm (69\")   Wt 102 kg (225 lb 6.4 oz)   SpO2 96%   BMI 33.29 kg/m²     Wt Readings from Last 3 Encounters:   11/02/22 102 kg (225 lb 6.4 oz)   08/01/22 98 kg (216 lb)   04/06/22 96.2 kg (212 lb)     BP Readings from Last 3 Encounters:   11/02/22 125/84   08/01/22 144/85   04/06/22 142/96     Physical Exam  Vitals reviewed.   Constitutional:       General: He is not in acute distress.     Appearance: Normal appearance. He is not ill-appearing, toxic-appearing or diaphoretic.   HENT:      Head: Normocephalic and atraumatic.   Eyes:      Conjunctiva/sclera: Conjunctivae normal.   Cardiovascular:      Rate and Rhythm: Normal rate and regular rhythm.      Pulses: Normal pulses.      Heart sounds: Normal heart " sounds. No murmur heard.    No friction rub. No gallop.   Pulmonary:      Effort: Pulmonary effort is normal. No respiratory distress.      Breath sounds: Normal breath sounds. No stridor. No wheezing, rhonchi or rales.   Chest:      Chest wall: No tenderness.   Abdominal:      General: Abdomen is flat.      Palpations: Abdomen is soft. There is no mass.      Tenderness: There is no abdominal tenderness.   Musculoskeletal:      Right lower leg: Edema present.      Left lower leg: Edema present.      Comments: 2+ LE edema bilaterally   Neurological:      General: No focal deficit present.      Mental Status: He is alert. Mental status is at baseline.   Psychiatric:         Mood and Affect: Mood normal.         Behavior: Behavior normal.         Thought Content: Thought content normal.         Judgment: Judgment normal.         Result Review :   The following data was reviewed by: Sid Arango MD on 11/02/2022:  Common labs    Common Labs 1/12/22 1/12/22 4/6/22 4/6/22 8/1/22    1252 1252 1128 1128    Glucose  88  80 86   BUN  13  9 11   Creatinine  0.89  0.82 0.90   eGFR Non African Am  89      Sodium  132 (A)  131 (A) 131 (A)   Potassium  4.8  4.5 5.1   Chloride  98  95 (A) 93 (A)   Calcium  9.9  9.8 9.8   Albumin    4.80 4.60   Total Bilirubin    0.4 0.6   Alkaline Phosphatase    60 51   AST (SGOT)    18 25   ALT (SGPT)    24 22   WBC   6.16     Hemoglobin   15.1     Hematocrit   45.1     Platelets   265     Total Cholesterol 189       Triglycerides 139       HDL Cholesterol 47       LDL Cholesterol  117 (A)       (A) Abnormal value                   Lab Results   Component Value Date    COVID19 NOT DETECTED 12/05/2020    INR 0.93 (L) 03/23/2021       Procedures        Assessment and Plan    Diagnoses and all orders for this visit:    1. Essential hypertension (Primary)  Comments:  -stable, will continue current regimen  Orders:  -     Basic metabolic panel  -     carvedilol (Coreg) 6.25 MG tablet; Take 1 tablet  by mouth 2 (Two) Times a Day With Meals.  Dispense: 180 tablet; Refill: 2  -     eplerenone (INSPRA) 50 MG tablet; Take 1 tablet by mouth Daily.  Dispense: 90 tablet; Refill: 2  -     hydrALAZINE (APRESOLINE) 10 MG tablet; Take 1 tablet by mouth 2 (Two) Times a Day.  Dispense: 180 tablet; Refill: 3  -     losartan (COZAAR) 50 MG tablet; Take 1 tablet by mouth 2 (Two) Times a Day.  Dispense: 180 tablet; Refill: 3    2. Benign prostatic hyperplasia without lower urinary tract symptoms  -     tamsulosin (FLOMAX) 0.4 MG capsule 24 hr capsule; Take 1 capsule by mouth Daily.  Dispense: 90 capsule; Refill: 3    3. Hyponatremia  Comments:  -BMP today  -edema noted, and this is a chronic problem; no diuretics as I do not wish to make his mild hyponatremia worse  Orders:  -     Basic metabolic panel    4. Overactive bladder    5. Pure hypercholesterolemia  -     atorvastatin (LIPITOR) 40 MG tablet; Take 1 tablet by mouth Daily.  Dispense: 90 tablet; Refill: 3  -     Lipid Panel    6. Hiatal hernia  -     omeprazole (priLOSEC) 20 MG capsule; Take 1 capsule by mouth Daily.  Dispense: 90 capsule; Refill: 3    Other orders  -     Cholecalciferol 125 MCG (5000 UT) tablet; Take 1 tablet by mouth Daily.  Dispense: 90 tablet; Refill: 2  -     Vibegron 75 MG tablet; Take 1 tablet by mouth Daily.  Dispense: 90 tablet; Refill: 2          Medications Discontinued During This Encounter   Medication Reason   • Cholecalciferol 125 MCG (5000 UT) tablet Reorder   • omeprazole (priLOSEC) 20 MG capsule Reorder   • atorvastatin (LIPITOR) 40 MG tablet Reorder   • Vibegron 75 MG tablet Reorder   • carvedilol (Coreg) 6.25 MG tablet Reorder   • eplerenone (INSPRA) 50 MG tablet Reorder   • losartan (COZAAR) 50 MG tablet Reorder   • hydrALAZINE (APRESOLINE) 10 MG tablet Reorder   • tamsulosin (FLOMAX) 0.4 MG capsule 24 hr capsule Reorder          Follow Up   Return in about 5 months (around 4/8/2023) for Annual physical.  Patient was given  instructions and counseling regarding his condition or for health maintenance advice. Please see specific information pulled into the AVS if appropriate.       Sid Arango MD  11/02/22  10:06 EDT

## 2022-11-03 DIAGNOSIS — E87.1 HYPONATREMIA: Primary | ICD-10-CM

## 2022-11-03 LAB
ANION GAP SERPL CALCULATED.3IONS-SCNC: 8.9 MMOL/L (ref 5–15)
BUN SERPL-MCNC: 9 MG/DL (ref 6–20)
BUN/CREAT SERPL: 10.3 (ref 7–25)
CALCIUM SPEC-SCNC: 9.7 MG/DL (ref 8.6–10.5)
CHLORIDE SERPL-SCNC: 92 MMOL/L (ref 98–107)
CHOLEST SERPL-MCNC: 176 MG/DL (ref 0–200)
CO2 SERPL-SCNC: 25.1 MMOL/L (ref 22–29)
CREAT SERPL-MCNC: 0.87 MG/DL (ref 0.76–1.27)
EGFRCR SERPLBLD CKD-EPI 2021: 101.3 ML/MIN/1.73
GLUCOSE SERPL-MCNC: 92 MG/DL (ref 65–99)
HDLC SERPL-MCNC: 52 MG/DL (ref 40–60)
LDLC SERPL CALC-MCNC: 103 MG/DL (ref 0–100)
LDLC/HDLC SERPL: 1.93 {RATIO}
POTASSIUM SERPL-SCNC: 4.9 MMOL/L (ref 3.5–5.2)
SODIUM SERPL-SCNC: 126 MMOL/L (ref 136–145)
TRIGL SERPL-MCNC: 118 MG/DL (ref 0–150)
VLDLC SERPL-MCNC: 21 MG/DL (ref 5–40)

## 2022-11-07 ENCOUNTER — TELEPHONE (OUTPATIENT)
Dept: INTERNAL MEDICINE | Facility: CLINIC | Age: 57
End: 2022-11-07

## 2022-11-07 NOTE — TELEPHONE ENCOUNTER
ATTEMPTED TO CONTACT PATIENT. LEFT MESSAGE TO CALL THE OFFICE BACK.    HUB OK TO READ/ADVISE:  Serum sodium has worsened from 131 to 126.  The following urine labs have been ordered for the patient to look further into this:    Urinalysis With Microscopic - Urine  Sodium, Urine, Random - Urine  Osmolality, Urine - Urine    These orders can be collected at one of the locations listed below:    [facility]:  [address] [place] [address]    Phone: [phone]      [facility]:  [address] [place] [id number]    Phone: [phone]    Hours of Operations: 6:30 a.m. - 5:00 p.m. (Monday-Friday)      No appointment is required at either location.       Based on these results from the test ordered, I might want to refer you to nephrology.     Lipids are stable.

## 2022-11-07 NOTE — TELEPHONE ENCOUNTER
----- Message from Sid Arango MD sent at 11/3/2022  8:03 AM EDT -----  Serum sodium has worsened from 131 to 126.  I'd like to schedule a nurse visit for some urine labs to look into this further.  Based on these results, I might want to refer you to nephrology.    Lipids are stable.

## 2022-11-07 NOTE — TELEPHONE ENCOUNTER
Caller: Jose Luis Casas    Relationship: Self    Best call back number: [phone]      What was the call regarding:       THE PATIENT WAS ADVISED OF THE HUB TO READ. HE IS REQUESTING THIS INFORMATION TO BE SENT TO HIM IN HIS MYCHART.

## 2022-11-11 ENCOUNTER — TELEPHONE (OUTPATIENT)
Dept: INTERNAL MEDICINE | Facility: CLINIC | Age: 57
End: 2022-11-11

## 2022-11-29 ENCOUNTER — LAB (OUTPATIENT)
Dept: LAB | Facility: HOSPITAL | Age: 57
End: 2022-11-29

## 2022-11-29 DIAGNOSIS — E87.1 HYPONATREMIA: ICD-10-CM

## 2022-11-29 LAB — SODIUM UR-SCNC: 44 MMOL/L

## 2022-11-29 PROCEDURE — 84300 ASSAY OF URINE SODIUM: CPT

## 2022-11-29 PROCEDURE — 83935 ASSAY OF URINE OSMOLALITY: CPT

## 2022-11-30 LAB — OSMOLALITY UR: 255 MOSM/KG (ref 300–800)

## 2022-12-04 DIAGNOSIS — E87.1 HYPONATREMIA: Primary | ICD-10-CM

## 2022-12-13 ENCOUNTER — LAB (OUTPATIENT)
Dept: LAB | Facility: HOSPITAL | Age: 57
End: 2022-12-13

## 2022-12-13 DIAGNOSIS — E87.1 HYPONATREMIA: ICD-10-CM

## 2022-12-13 LAB
ANION GAP SERPL CALCULATED.3IONS-SCNC: 9 MMOL/L (ref 5–15)
BACTERIA UR QL AUTO: ABNORMAL /HPF
BILIRUB UR QL STRIP: NEGATIVE
BUN SERPL-MCNC: 10 MG/DL (ref 6–20)
BUN/CREAT SERPL: 13.2 (ref 7–25)
CALCIUM SPEC-SCNC: 9.4 MG/DL (ref 8.6–10.5)
CHLORIDE SERPL-SCNC: 96 MMOL/L (ref 98–107)
CLARITY UR: CLEAR
CO2 SERPL-SCNC: 26 MMOL/L (ref 22–29)
COLOR UR: YELLOW
CORTIS SERPL-MCNC: 10.4 MCG/DL
CREAT SERPL-MCNC: 0.76 MG/DL (ref 0.76–1.27)
EGFRCR SERPLBLD CKD-EPI 2021: 105.5 ML/MIN/1.73
GLUCOSE SERPL-MCNC: 86 MG/DL (ref 65–99)
GLUCOSE UR STRIP-MCNC: NEGATIVE MG/DL
HGB UR QL STRIP.AUTO: NEGATIVE
HYALINE CASTS UR QL AUTO: ABNORMAL /LPF
KETONES UR QL STRIP: NEGATIVE
LEUKOCYTE ESTERASE UR QL STRIP.AUTO: NEGATIVE
NITRITE UR QL STRIP: NEGATIVE
PH UR STRIP.AUTO: 7 [PH] (ref 5–8)
POTASSIUM SERPL-SCNC: 4.5 MMOL/L (ref 3.5–5.2)
PROT UR QL STRIP: NEGATIVE
RBC # UR STRIP: ABNORMAL /HPF
REF LAB TEST METHOD: ABNORMAL
SODIUM SERPL-SCNC: 131 MMOL/L (ref 136–145)
SP GR UR STRIP: 1.01 (ref 1–1.03)
SQUAMOUS #/AREA URNS HPF: ABNORMAL /HPF
UROBILINOGEN UR QL STRIP: NORMAL
WBC # UR STRIP: ABNORMAL /HPF

## 2022-12-13 PROCEDURE — 81001 URINALYSIS AUTO W/SCOPE: CPT

## 2022-12-13 PROCEDURE — 80048 BASIC METABOLIC PNL TOTAL CA: CPT

## 2022-12-13 PROCEDURE — 36415 COLL VENOUS BLD VENIPUNCTURE: CPT

## 2022-12-13 PROCEDURE — 82533 TOTAL CORTISOL: CPT

## 2023-03-03 ENCOUNTER — TELEPHONE (OUTPATIENT)
Dept: INTERNAL MEDICINE | Facility: CLINIC | Age: 58
End: 2023-03-03
Payer: COMMERCIAL

## 2023-03-07 ENCOUNTER — LAB (OUTPATIENT)
Dept: LAB | Facility: HOSPITAL | Age: 58
End: 2023-03-07
Payer: COMMERCIAL

## 2023-03-07 ENCOUNTER — TRANSCRIBE ORDERS (OUTPATIENT)
Dept: LAB | Facility: HOSPITAL | Age: 58
End: 2023-03-07
Payer: COMMERCIAL

## 2023-03-07 ENCOUNTER — PRIOR AUTHORIZATION (OUTPATIENT)
Dept: INTERNAL MEDICINE | Facility: CLINIC | Age: 58
End: 2023-03-07
Payer: COMMERCIAL

## 2023-03-07 DIAGNOSIS — I10 HYPERTENSION, ESSENTIAL: Primary | ICD-10-CM

## 2023-03-07 DIAGNOSIS — E87.1 HYPONATREMIA: ICD-10-CM

## 2023-03-07 DIAGNOSIS — I10 HYPERTENSION, ESSENTIAL: ICD-10-CM

## 2023-03-07 LAB
ANION GAP SERPL CALCULATED.3IONS-SCNC: 7.4 MMOL/L (ref 5–15)
BUN SERPL-MCNC: 14 MG/DL (ref 6–20)
BUN/CREAT SERPL: 15.6 (ref 7–25)
CALCIUM SPEC-SCNC: 10.2 MG/DL (ref 8.6–10.5)
CHLORIDE SERPL-SCNC: 96 MMOL/L (ref 98–107)
CO2 SERPL-SCNC: 27.6 MMOL/L (ref 22–29)
CREAT SERPL-MCNC: 0.9 MG/DL (ref 0.76–1.27)
EGFRCR SERPLBLD CKD-EPI 2021: 100.2 ML/MIN/1.73
GLUCOSE SERPL-MCNC: 89 MG/DL (ref 65–99)
OSMOLALITY UR: 179 MOSM/KG
POTASSIUM SERPL-SCNC: 4.7 MMOL/L (ref 3.5–5.2)
SODIUM SERPL-SCNC: 131 MMOL/L (ref 136–145)
SODIUM UR-SCNC: 26 MMOL/L

## 2023-03-07 PROCEDURE — 36415 COLL VENOUS BLD VENIPUNCTURE: CPT

## 2023-03-07 PROCEDURE — 83935 ASSAY OF URINE OSMOLALITY: CPT

## 2023-03-07 PROCEDURE — 83930 ASSAY OF BLOOD OSMOLALITY: CPT

## 2023-03-07 PROCEDURE — 80048 BASIC METABOLIC PNL TOTAL CA: CPT

## 2023-03-07 PROCEDURE — 84300 ASSAY OF URINE SODIUM: CPT

## 2023-03-08 LAB — OSMOLALITY SERPL: 283 MOSM/KG (ref 275–300)

## 2023-03-27 ENCOUNTER — LAB (OUTPATIENT)
Dept: LAB | Facility: HOSPITAL | Age: 58
End: 2023-03-27
Payer: COMMERCIAL

## 2023-03-27 ENCOUNTER — TRANSCRIBE ORDERS (OUTPATIENT)
Dept: LAB | Facility: HOSPITAL | Age: 58
End: 2023-03-27
Payer: COMMERCIAL

## 2023-03-27 DIAGNOSIS — I10 ESSENTIAL HYPERTENSION: ICD-10-CM

## 2023-03-27 DIAGNOSIS — I10 HYPERTENSION, ESSENTIAL: Primary | ICD-10-CM

## 2023-03-27 DIAGNOSIS — I10 HYPERTENSION, ESSENTIAL: ICD-10-CM

## 2023-03-27 LAB
ALBUMIN SERPL-MCNC: 4.6 G/DL (ref 3.5–5.2)
ALBUMIN UR-MCNC: 1.5 MG/DL
ALBUMIN/GLOB SERPL: 1.6 G/DL
ALP SERPL-CCNC: 61 U/L (ref 39–117)
ALT SERPL W P-5'-P-CCNC: 26 U/L (ref 1–41)
ANION GAP SERPL CALCULATED.3IONS-SCNC: 9 MMOL/L (ref 5–15)
AST SERPL-CCNC: 23 U/L (ref 1–40)
BACTERIA UR QL AUTO: NORMAL /HPF
BASOPHILS # BLD AUTO: 0.05 10*3/MM3 (ref 0–0.2)
BASOPHILS NFR BLD AUTO: 0.8 % (ref 0–1.5)
BILIRUB SERPL-MCNC: 0.4 MG/DL (ref 0–1.2)
BILIRUB UR QL STRIP: NEGATIVE
BUN SERPL-MCNC: 11 MG/DL (ref 6–20)
BUN/CREAT SERPL: 13.8 (ref 7–25)
CALCIUM SPEC-SCNC: 9.9 MG/DL (ref 8.6–10.5)
CHLORIDE SERPL-SCNC: 101 MMOL/L (ref 98–107)
CLARITY UR: CLEAR
CO2 SERPL-SCNC: 27 MMOL/L (ref 22–29)
COLOR UR: YELLOW
CREAT SERPL-MCNC: 0.8 MG/DL (ref 0.76–1.27)
CREAT UR-MCNC: 58.1 MG/DL
CREAT UR-MCNC: 61.6 MG/DL
DEPRECATED RDW RBC AUTO: 40.4 FL (ref 37–54)
EGFRCR SERPLBLD CKD-EPI 2021: 103.9 ML/MIN/1.73
EOSINOPHIL # BLD AUTO: 0.2 10*3/MM3 (ref 0–0.4)
EOSINOPHIL NFR BLD AUTO: 3.1 % (ref 0.3–6.2)
ERYTHROCYTE [DISTWIDTH] IN BLOOD BY AUTOMATED COUNT: 12 % (ref 12.3–15.4)
GLOBULIN UR ELPH-MCNC: 2.8 GM/DL
GLUCOSE SERPL-MCNC: 111 MG/DL (ref 65–99)
GLUCOSE UR STRIP-MCNC: NEGATIVE MG/DL
HCT VFR BLD AUTO: 43.2 % (ref 37.5–51)
HGB BLD-MCNC: 14.7 G/DL (ref 13–17.7)
HGB UR QL STRIP.AUTO: ABNORMAL
HYALINE CASTS UR QL AUTO: NORMAL /LPF
IMM GRANULOCYTES # BLD AUTO: 0.02 10*3/MM3 (ref 0–0.05)
IMM GRANULOCYTES NFR BLD AUTO: 0.3 % (ref 0–0.5)
KETONES UR QL STRIP: NEGATIVE
LEUKOCYTE ESTERASE UR QL STRIP.AUTO: NEGATIVE
LYMPHOCYTES # BLD AUTO: 2.19 10*3/MM3 (ref 0.7–3.1)
LYMPHOCYTES NFR BLD AUTO: 33.5 % (ref 19.6–45.3)
MCH RBC QN AUTO: 31.6 PG (ref 26.6–33)
MCHC RBC AUTO-ENTMCNC: 34 G/DL (ref 31.5–35.7)
MCV RBC AUTO: 92.9 FL (ref 79–97)
MICROALBUMIN/CREAT UR: 25.8 MG/G
MONOCYTES # BLD AUTO: 0.53 10*3/MM3 (ref 0.1–0.9)
MONOCYTES NFR BLD AUTO: 8.1 % (ref 5–12)
NEUTROPHILS NFR BLD AUTO: 3.54 10*3/MM3 (ref 1.7–7)
NEUTROPHILS NFR BLD AUTO: 54.2 % (ref 42.7–76)
NITRITE UR QL STRIP: NEGATIVE
NRBC BLD AUTO-RTO: 0 /100 WBC (ref 0–0.2)
PH UR STRIP.AUTO: 7 [PH] (ref 5–8)
PLATELET # BLD AUTO: 302 10*3/MM3 (ref 140–450)
PMV BLD AUTO: 9.3 FL (ref 6–12)
POTASSIUM SERPL-SCNC: 4.7 MMOL/L (ref 3.5–5.2)
PROT ?TM UR-MCNC: 7.3 MG/DL
PROT SERPL-MCNC: 7.4 G/DL (ref 6–8.5)
PROT UR QL STRIP: NEGATIVE
PROT/CREAT UR: 0.12 MG/G{CREAT}
RBC # BLD AUTO: 4.65 10*6/MM3 (ref 4.14–5.8)
RBC # UR STRIP: NORMAL /HPF
REF LAB TEST METHOD: NORMAL
SODIUM SERPL-SCNC: 137 MMOL/L (ref 136–145)
SP GR UR STRIP: 1.01 (ref 1–1.03)
SQUAMOUS #/AREA URNS HPF: NORMAL /HPF
UROBILINOGEN UR QL STRIP: ABNORMAL
WBC # UR STRIP: NORMAL /HPF
WBC NRBC COR # BLD: 6.53 10*3/MM3 (ref 3.4–10.8)

## 2023-03-27 PROCEDURE — 81001 URINALYSIS AUTO W/SCOPE: CPT

## 2023-03-27 PROCEDURE — 82043 UR ALBUMIN QUANTITATIVE: CPT

## 2023-03-27 PROCEDURE — 36415 COLL VENOUS BLD VENIPUNCTURE: CPT

## 2023-03-27 PROCEDURE — 84156 ASSAY OF PROTEIN URINE: CPT

## 2023-03-27 PROCEDURE — 80053 COMPREHEN METABOLIC PANEL: CPT

## 2023-03-27 PROCEDURE — 85025 COMPLETE CBC W/AUTO DIFF WBC: CPT

## 2023-03-27 PROCEDURE — 82570 ASSAY OF URINE CREATININE: CPT

## 2023-03-27 RX ORDER — CARVEDILOL 12.5 MG/1
12.5 TABLET ORAL 2 TIMES DAILY WITH MEALS
Qty: 180 TABLET | Refills: 2 | Status: SHIPPED | OUTPATIENT
Start: 2023-03-27 | End: 2023-03-31 | Stop reason: SDUPTHER

## 2023-03-31 DIAGNOSIS — I10 ESSENTIAL HYPERTENSION: ICD-10-CM

## 2023-03-31 RX ORDER — CARVEDILOL 12.5 MG/1
12.5 TABLET ORAL 2 TIMES DAILY WITH MEALS
Qty: 180 TABLET | Refills: 2 | Status: SHIPPED | OUTPATIENT
Start: 2023-03-31

## 2023-04-20 ENCOUNTER — OFFICE VISIT (OUTPATIENT)
Dept: INTERNAL MEDICINE | Facility: CLINIC | Age: 58
End: 2023-04-20
Payer: COMMERCIAL

## 2023-04-20 VITALS
WEIGHT: 227 LBS | DIASTOLIC BLOOD PRESSURE: 94 MMHG | OXYGEN SATURATION: 97 % | HEIGHT: 69 IN | HEART RATE: 74 BPM | TEMPERATURE: 98.4 F | BODY MASS INDEX: 33.62 KG/M2 | SYSTOLIC BLOOD PRESSURE: 164 MMHG

## 2023-04-20 DIAGNOSIS — I10 ESSENTIAL HYPERTENSION: ICD-10-CM

## 2023-04-20 DIAGNOSIS — E87.1 HYPONATREMIA: ICD-10-CM

## 2023-04-20 DIAGNOSIS — L03.119 CELLULITIS OF LOWER EXTREMITY, UNSPECIFIED LATERALITY: Primary | ICD-10-CM

## 2023-04-20 DIAGNOSIS — R60.0 LOWER EXTREMITY EDEMA: ICD-10-CM

## 2023-04-20 RX ORDER — DOXYCYCLINE HYCLATE 100 MG/1
100 CAPSULE ORAL 2 TIMES DAILY
Qty: 10 CAPSULE | Refills: 0 | Status: SHIPPED | OUTPATIENT
Start: 2023-04-20 | End: 2023-04-25

## 2023-04-20 RX ORDER — FUROSEMIDE 40 MG/1
40 TABLET ORAL DAILY
Qty: 7 TABLET | Refills: 0 | Status: SHIPPED | OUTPATIENT
Start: 2023-04-20 | End: 2023-04-27

## 2023-04-20 NOTE — PATIENT INSTRUCTIONS
Cooking With Less Salt  Cooking with less salt is one way to reduce the amount of sodium you get from food. Sodium is one of the elements that make up salt. It is found naturally in foods and is also added to certain foods. Depending on your condition and overall health, your health care provider or dietitian may recommend that you reduce your sodium intake. Most people should have less than 2,300 milligrams (mg) of sodium each day. If you have high blood pressure (hypertension), you may need to limit your sodium to 1,500 mg each day. Follow the tips below to help reduce your sodium intake.  What are tips for eating less sodium?  Reading food labels       Check the food label before buying or using packaged ingredients. Always check the label for the serving size and sodium content.  Look for products with no more than 140 mg of sodium in one serving.  Check the % Daily Value column to see what percent of the daily recommended amount of sodium is provided in one serving of the product. Foods with 5% or less in this column are considered low in sodium. Foods with 20% or higher are considered high in sodium.  Do not choose foods with salt as one of the first three ingredients on the ingredients list. If salt is one of the first three ingredients, it usually means the item is high in sodium.     Shopping  Buy sodium-free or low-sodium products. Look for the following words on food labels:  Low-sodium.  Sodium-free.  Reduced-sodium.  No salt added.  Unsalted.  Always check the sodium content even if foods are labeled as low-sodium or no salt added.  Buy fresh foods.  Cooking  Use herbs, seasonings without salt, and spices as substitutes for salt.  Use sodium-free baking soda when baking.  [place], braise, or roast foods to add flavor with less salt.  Avoid adding salt to pasta, rice, or hot cereals.  Drain and rinse canned vegetables, beans, and meat before use.  Avoid adding salt when cooking sweets and desserts.  Cook  "with low-sodium ingredients.  What foods are high in sodium?  Vegetables  Regular canned vegetables (not low-sodium or reduced-sodium). Sauerkraut, pickled vegetables, and relishes. Olives. French fries. Onion rings. Regular canned tomato sauce and paste. Regular tomato and vegetable juice. Frozen vegetables in sauces.  Grains  Instant hot cereals. Bread stuffing, pancake, and biscuit mixes. Croutons. Seasoned rice or pasta mixes. Noodle soup cups. Boxed or frozen macaroni and cheese. Regular salted crackers. Self-rising flour. Rolls. Bagels. Flour tortillas and wraps.  Meats and other proteins  Meat or fish that is salted, canned, smoked, cured, spiced, or pickled. This includes pérez, ham, sausages, hot dogs, corned beef, chipped beef, meat loaves, salt pork, jerky, pickled herring, anchovies, regular canned tuna, and sardines. Salted nuts.  Dairy  Processed cheese and cheese spreads. Cheese curds. Blue cheese. Feta cheese. String cheese. Regular cottage cheese. Buttermilk. Canned milk.  The items listed above may not be a complete list of foods high in sodium. Actual amounts of sodium may be different depending on processing. Contact a dietitian for more information.  What foods are low in sodium?  Fruits  Fresh, frozen, or canned fruit with no sauce added. Fruit juice.  Vegetables  Fresh or frozen vegetables with no sauce added. \"No salt added\" canned vegetables. \"No salt added\" tomato sauce and paste. Low-sodium or reduced-sodium tomato and vegetable juice.  Grains  Noodles, pasta, quinoa, rice. Shredded or puffed wheat or puffed rice. Regular or quick oats (not instant). Low-sodium crackers. Low-sodium bread. Whole-grain bread and whole-grain pasta. Unsalted popcorn.  Meats and other proteins  Fresh or frozen whole meats, poultry (not injected with sodium), and fish with no sauce added. Unsalted nuts. Dried peas, beans, and lentils without added salt. Unsalted canned beans. Eggs. Unsalted nut butters. " Low-sodium canned tuna or chicken.  Dairy  Milk. Soy milk. Yogurt. Low-sodium cheeses, such as Swiss, [place] Mehrdad, mozzarella, and ricotta. Sherbet or ice cream (keep to ½ cup per serving). Cream cheese.  Fats and oils  Unsalted butter or margarine.  Other foods  Homemade pudding. Sodium-free baking soda and baking powder. Herbs and spices. Low-sodium seasoning mixes.  Beverages  Coffee and tea. Carbonated beverages.  The items listed above may not be a complete list of foods low in sodium. Actual amounts of sodium may be different depending on processing. Contact a dietitian for more information.  What are some salt alternatives when cooking?  The following are herbs, seasonings, and spices that can be used instead of salt to flavor your food. Herbs should be fresh or dried. Do not choose packaged mixes. Next to the name of the herb, spice, or seasoning are some examples of foods you can pair it with.  Herbs  Bay leaves - Soups, meat and vegetable dishes, and spaghetti sauce.  Basil - Italian dishes, soups, pasta, and fish dishes.  Cilantro - Meat, poultry, and vegetable dishes.  Chili powder - Marinades and Mexican dishes.  Chives - Salad dressings and potato dishes.  Cumin - Mexican dishes, couscous, and meat dishes.  Dill - Fish dishes, sauces, and salads.  Fennel - Meat and vegetable dishes, breads, and cookies.  Garlic (do not use garlic salt) - Italian dishes, meat dishes, salad dressings, and sauces.  Marjoram - Soups, potato dishes, and meat dishes.  Oregano - Pizza and spaghetti sauce.  Parsley - Salads, soups, pasta, and meat dishes.  Lynda - Italian dishes, salad dressings, soups, and red meats.  Saffron - Fish dishes, pasta, and some poultry dishes.  Espinoza - Stuffings and sauces.  Tarragon - Fish and poultry dishes.  Thyme - Stuffing, meat, and fish dishes.  Seasonings  Lemon juice - Fish dishes, poultry dishes, vegetables, and salads.  Vinegar - Salad dressings, vegetables, and fish  "dishes.  Spices  Cinnamon - Sweet dishes, such as cakes, cookies, and puddings.  Cloves - Gingerbread, puddings, and marinades for meats.  Stewart - Vegetable dishes, fish and poultry dishes, and stir-patton dishes.  Mira - Vegetable dishes, fish dishes, and stir-patton dishes.  Nutmeg - Pasta, vegetables, poultry, fish dishes, and custard.  Summary  Cooking with less salt is one way to reduce the amount of sodium that you get from food.  Buy sodium-free or low-sodium products.  Check the food label before using or buying packaged ingredients.  Use herbs, seasonings without salt, and spices as substitutes for salt in foods.  This information is not intended to replace advice given to you by your health care provider. Make sure you discuss any questions you have with your health care provider.  Document Revised: 12/09/2020 Document Reviewed: 12/09/2020  [employer] Patient Education © 2021 [employer]      https://www.nhlbi.nih.gov/files/docs/public/heart/dash_brief.pdf\">   DASH Eating Plan  DASH stands for Dietary Approaches to Stop Hypertension. The DASH eating plan is a healthy eating plan that has been shown to:  Reduce high blood pressure (hypertension).  Reduce your risk for type 2 diabetes, heart disease, and stroke.  Help with weight loss.  What are tips for following this plan?  Reading food labels  Check food labels for the amount of salt (sodium) per serving. Choose foods with less than 5 percent of the Daily Value of sodium. Generally, foods with less than 300 milligrams (mg) of sodium per serving fit into this eating plan.  To find whole grains, look for the word \"whole\" as the first word in the ingredient list.  Shopping  Buy products labeled as \"low-sodium\" or \"no salt added.\"  Buy fresh foods. Avoid canned foods and pre-made or frozen meals.  Cooking  Avoid adding salt when cooking. Use salt-free seasonings or herbs instead of table salt or sea salt. Check with your health care provider or pharmacist before " using salt substitutes.  Do not patton foods. Cook foods using healthy methods such as baking, boiling, grilling, roasting, and broiling instead.  Cook with heart-healthy oils, such as olive, canola, avocado, soybean, or sunflower oil.  Meal planning       Eat a balanced diet that includes:  4 or more servings of fruits and 4 or more servings of vegetables each day. Try to fill one-half of your plate with fruits and vegetables.  6-8 servings of whole grains each day.  Less than 6 oz (170 g) of lean meat, poultry, or fish each day. A 3-oz (85-g) serving of meat is about the same size as a deck of cards. One egg equals 1 oz (28 g).  2-3 servings of low-fat dairy each day. One serving is 1 cup (237 mL).  1 serving of nuts, seeds, or beans 5 times each week.  2-3 servings of heart-healthy fats. Healthy fats called omega-3 fatty acids are found in foods such as walnuts, flaxseeds, fortified milks, and eggs. These fats are also found in cold-water fish, such as sardines, salmon, and mackerel.  Limit how much you eat of:  Canned or prepackaged foods.  Food that is high in trans fat, such as some fried foods.  Food that is high in saturated fat, such as fatty meat.  Desserts and other sweets, sugary drinks, and other foods with added sugar.  Full-fat dairy products.  Do not salt foods before eating.  Do not eat more than 4 egg yolks a week.  Try to eat at least 2 vegetarian meals a week.  Eat more home-cooked food and less restaurant, buffet, and fast food.     Lifestyle  When eating at a restaurant, ask that your food be prepared with less salt or no salt, if possible.  If you drink alcohol:  Limit how much you use to:  0-1 drink a day for women who are not pregnant.  0-2 drinks a day for men.  Be aware of how much alcohol is in your drink. In the [place] one drink equals one 12 oz bottle of beer (355 mL), one 5 oz glass of wine (148 mL), or one 1½ oz glass of hard liquor (44 mL).  General information  Avoid eating more than  2,300 mg of salt a day. If you have hypertension, you may need to reduce your sodium intake to 1,500 mg a day.  Work with your health care provider to maintain a healthy body weight or to lose weight. Ask what an ideal weight is for you.  Get at least 30 minutes of exercise that causes your heart to beat faster (aerobic exercise) most days of the week. Activities may include walking, swimming, or biking.  Work with your health care provider or dietitian to adjust your eating plan to your individual calorie needs.  What foods should I eat?  Fruits  All fresh, dried, or frozen fruit. Canned fruit in natural juice (without added sugar).  Vegetables  Fresh or frozen vegetables (raw, steamed, roasted, or grilled). Low-sodium or reduced-sodium tomato and vegetable juice. Low-sodium or reduced-sodium tomato sauce and tomato paste. Low-sodium or reduced-sodium canned vegetables.  Grains  Whole-grain or whole-wheat bread. Whole-grain or whole-wheat pasta. Brown rice. Oatmeal. Quinoa. Bulgur. Whole-grain and low-sodium cereals. Maria D bread. Low-fat, low-sodium crackers. Whole-wheat flour tortillas.  Meats and other proteins  Skinless chicken or turkey. Ground chicken or turkey. Pork with fat trimmed off. Fish and seafood. Egg whites. Dried beans, peas, or lentils. Unsalted nuts, nut butters, and seeds. Unsalted canned beans. Lean cuts of beef with fat trimmed off. Low-sodium, lean precooked or cured meat, such as sausages or meat loaves.  Dairy  Low-fat (1%) or fat-free (skim) milk. Reduced-fat, low-fat, or fat-free cheeses. Nonfat, low-sodium ricotta or cottage cheese. Low-fat or nonfat yogurt. Low-fat, low-sodium cheese.  Fats and oils  Soft margarine without trans fats. Vegetable oil. Reduced-fat, low-fat, or light mayonnaise and salad dressings (reduced-sodium). Canola, safflower, olive, avocado, soybean, and sunflower oils. Avocado.  Seasonings and condiments  Herbs. Spices. Seasoning mixes without salt.  Other  foods  Unsalted popcorn and pretzels. Fat-free sweets.  The items listed above may not be a complete list of foods and beverages you can eat. Contact a dietitian for more information.  What foods should I avoid?  Fruits  Canned fruit in a light or heavy syrup. Fried fruit. Fruit in cream or butter sauce.  Vegetables  Creamed or fried vegetables. Vegetables in a cheese sauce. Regular canned vegetables (not low-sodium or reduced-sodium). Regular canned tomato sauce and paste (not low-sodium or reduced-sodium). Regular tomato and vegetable juice (not low-sodium or reduced-sodium). Pickles. Olives.  Grains  Baked goods made with fat, such as croissants, muffins, or some breads. Dry pasta or rice meal packs.  Meats and other proteins  Fatty cuts of meat. Ribs. Fried meat. [place]augh. Bologna, salami, and other precooked or cured meats, such as sausages or meat loaves. Fat from the back of a pig (fatback). Bratwurst. Salted nuts and seeds. Canned beans with added salt. Canned or smoked fish. Whole eggs or egg yolks. Chicken or turkey with skin.  Dairy  Whole or 2% milk, cream, and half-and-half. Whole or full-fat cream cheese. Whole-fat or sweetened yogurt. Full-fat cheese. Nondairy creamers. Whipped toppings. Processed cheese and cheese spreads.  Fats and oils  Butter. Stick margarine. Lard. Shortening. Ghee. [place]augh fat. Tropical oils, such as coconut, palm kernel, or palm oil.  Seasonings and condiments  Onion salt, garlic salt, seasoned salt, table salt, and sea salt. Worcestershire sauce. Tartar sauce. Barbecue sauce. Teriyaki sauce. Soy sauce, including reduced-sodium. Steak sauce. Canned and packaged gravies. Fish sauce. Oyster sauce. Cocktail sauce. Store-bought horseradish. Ketchup. Mustard. Meat flavorings and tenderizers. Bouillon cubes. Hot sauces. Pre-made or packaged marinades. Pre-made or packaged taco seasonings. Relishes. Regular salad dressings.  Other foods  Salted popcorn and pretzels.  The items listed above  may not be a complete list of foods and beverages you should avoid. Contact a dietitian for more information.  Where to find more information  National Heart, Lung, and Blood [place]: www.nhlbi.nih.gov  American Heart Association: www.heart.org  Academy of Nutrition and Dietetics: www.eatright.org  National Kidney Foundation: www.kidney.org  Summary  The DASH eating plan is a healthy eating plan that has been shown to reduce high blood pressure (hypertension). It may also reduce your risk for type 2 diabetes, heart disease, and stroke.  When on the DASH eating plan, aim to eat more fresh fruits and vegetables, whole grains, lean proteins, low-fat dairy, and heart-healthy fats.  With the DASH eating plan, you should limit salt (sodium) intake to 2,300 mg a day. If you have hypertension, you may need to reduce your sodium intake to 1,500 mg a day.  Work with your health care provider or dietitian to adjust your eating plan to your individual calorie needs.  This information is not intended to replace advice given to you by your health care provider. Make sure you discuss any questions you have with your health care provider.  Document Revised: 11/20/2020 Document Reviewed: 11/20/2020  [employer] Patient Education © 2021 [employer].       Heart-Healthy Eating Plan  Heart-healthy meal planning includes:  Eating less unhealthy fats.  Eating more healthy fats.  Making other changes in your diet.  Talk with your doctor or a diet specialist (dietitian) to create an eating plan that is right for you.  What is my plan?  Your doctor may recommend an eating plan that includes:  Total fat: ______% or less of total calories a day.  Saturated fat: ______% or less of total calories a day.  Cholesterol: less than _________mg a day.  What are tips for following this plan?  Cooking  Avoid frying your food. Try to bake, boil, grill, or broil it instead. You can also reduce fat by:  Removing the skin from poultry.  Removing all  visible fats from meats.  Steaming vegetables in water or broth.  Meal planning       At meals, divide your plate into four equal parts:  Fill one-half of your plate with vegetables and green salads.  Fill one-fourth of your plate with whole grains.  Fill one-fourth of your plate with lean protein foods.  Eat 4-5 servings of vegetables per day. A serving of vegetables is:  1 cup of raw or cooked vegetables.  2 cups of raw leafy greens.  Eat 4-5 servings of fruit per day. A serving of fruit is:  1 medium whole fruit.  ¼ cup of dried fruit.  ½ cup of fresh, frozen, or canned fruit.  ½ cup of 100% fruit juice.  Eat more foods that have soluble fiber. These are apples, broccoli, carrots, beans, peas, and barley. Try to get 20-30 g of fiber per day.  Eat 4-5 servings of nuts, legumes, and seeds per week:  1 serving of dried beans or legumes equals ½ cup after being cooked.  1 serving of nuts is ¼ cup.  1 serving of seeds equals 1 tablespoon.     General information  Eat more home-cooked food. Eat less restaurant, buffet, and fast food.  Limit or avoid alcohol.  Limit foods that are high in starch and sugar.  Avoid fried foods.  Lose weight if you are overweight.  Keep track of how much salt (sodium) you eat. This is important if you have high blood pressure. Ask your doctor to tell you more about this.  Try to add vegetarian meals each week.  Fats  Choose healthy fats. These include olive oil and canola oil, flaxseeds, walnuts, almonds, and seeds.  Eat more omega-3 fats. These include salmon, mackerel, sardines, tuna, flaxseed oil, and ground flaxseeds. Try to eat fish at least 2 times each week.  Check food labels. Avoid foods with trans fats or high amounts of saturated fat.  Limit saturated fats.  These are often found in animal products, such as meats, butter, and cream.  These are also found in plant foods, such as palm oil, palm kernel oil, and coconut oil.  Avoid foods with partially hydrogenated oils in them.  These have trans fats. Examples are stick margarine, some tub margarines, cookies, crackers, and other baked goods.  What foods can I eat?  Fruits  All fresh, canned (in natural juice), or frozen fruits.  Vegetables  Fresh or frozen vegetables (raw, steamed, roasted, or grilled). Green salads.  Grains  Most grains. Choose whole wheat and whole grains most of the time. Rice and pasta, including brown rice and pastas made with whole wheat.  Meats and other proteins  Lean, well-trimmed beef, veal, pork, and lamb. Chicken and turkey without skin. All fish and shellfish. Wild duck, rabbit, pheasant, and venison. Egg whites or low-cholesterol egg substitutes. Dried beans, peas, lentils, and tofu. Seeds and most nuts.  Dairy  Low-fat or nonfat cheeses, including ricotta and mozzarella. Skim or 1% milk that is liquid, powdered, or evaporated. Buttermilk that is made with low-fat milk. Nonfat or low-fat yogurt.  Fats and oils  Non-hydrogenated (trans-free) margarines. Vegetable oils, including soybean, sesame, sunflower, olive, peanut, safflower, corn, canola, and cottonseed. Salad dressings or mayonnaise made with a vegetable oil.  Beverages  Mineral water. Coffee and tea. Diet carbonated beverages.  Sweets and desserts  Sherbet, gelatin, and fruit ice. Small amounts of dark chocolate.  Limit all sweets and desserts.  Seasonings and condiments  All seasonings and condiments.  The items listed above may not be a complete list of foods and drinks you can eat. Contact a dietitian for more options.  What foods should I avoid?  Fruits  Canned fruit in heavy syrup. Fruit in cream or butter sauce. Fried fruit. Limit coconut.  Vegetables  Vegetables cooked in cheese, cream, or butter sauce. Fried vegetables.  Grains  Breads that are made with saturated or trans fats, oils, or whole milk. Croissants. Sweet rolls. Donuts. High-fat crackers, such as cheese crackers.  Meats and other proteins  Fatty meats, such as hot dogs, ribs,  sausage, pérez, rib-eye roast or steak. High-fat deli meats, such as salami and bologna. Caviar. Domestic duck and goose. Organ meats, such as liver.  Dairy  Cream, sour cream, cream cheese, and creamed cottage cheese. Whole-milk cheeses. Whole or 2% milk that is liquid, evaporated, or condensed. Whole buttermilk. Cream sauce or high-fat cheese sauce. Yogurt that is made from whole milk.  Fats and oils  Meat fat, or shortening. Cocoa butter, hydrogenated oils, palm oil, coconut oil, palm kernel oil. Solid fats and shortenings, including pérez fat, salt pork, lard, and butter. Nondairy cream substitutes. Salad dressings with cheese or sour cream.  Beverages  Regular sodas and juice drinks with added sugar.  Sweets and desserts  Frosting. Pudding. Cookies. Cakes. Pies. Milk chocolate or white chocolate. Buttered syrups. Full-fat ice cream or ice cream drinks.  The items listed above may not be a complete list of foods and drinks to avoid. Contact a dietitian for more information.  Summary  Heart-healthy meal planning includes eating less unhealthy fats, eating more healthy fats, and making other changes in your diet.  Eat a balanced diet. This includes fruits and vegetables, low-fat or nonfat dairy, lean protein, nuts and legumes, whole grains, and heart-healthy oils and fats.  This information is not intended to replace advice given to you by your health care provider. Make sure you discuss any questions you have with your health care provider.  Document Revised: 02/21/2019 Document Reviewed: 01/25/2019  [employer]vier Patient Education © 2021 [employer].       Mediterranean Diet  A Mediterranean diet refers to food and lifestyle choices that are based on the traditions of countries located on the [place]. This way of eating has been shown to help prevent certain conditions and improve outcomes for people who have chronic diseases, like kidney disease and heart disease.  What are tips for following this  plan?  Lifestyle  Cook and eat meals together with your family, when possible.  Drink enough fluid to keep your urine clear or pale yellow.  Be physically active every day. This includes:  Aerobic exercise like running or swimming.  Leisure activities like gardening, walking, or housework.  Get 7-8 hours of sleep each night.  If recommended by your health care provider, drink red wine in moderation. This means 1 glass a day for nonpregnant women and 2 glasses a day for men. A glass of wine equals 5 oz (150 mL).  Reading food labels       Check the serving size of packaged foods. For foods such as rice and pasta, the serving size refers to the amount of cooked product, not dry.  Check the total fat in packaged foods. Avoid foods that have saturated fat or trans fats.  Check the ingredients list for added sugars, such as corn syrup.     Shopping  At the grocery store, buy most of your food from the areas near the walls of the store. This includes:  Fresh fruits and vegetables (produce).  Grains, beans, nuts, and seeds. Some of these may be available in unpackaged forms or large amounts (in bulk).  Fresh seafood.  Poultry and eggs.  Low-fat dairy products.  Buy whole ingredients instead of prepackaged foods.  Buy fresh fruits and vegetables in-season from local farmers markets.  Buy frozen fruits and vegetables in resealable bags.  If you do not have access to quality fresh seafood, buy precooked frozen shrimp or canned fish, such as tuna, salmon, or sardines.  Buy small amounts of raw or cooked vegetables, salads, or olives from the deli or salad bar at your store.  Stock your pantry so you always have certain foods on hand, such as olive oil, canned tuna, canned tomatoes, rice, pasta, and beans.  Cooking  Cook foods with extra-virgin olive oil instead of using butter or other vegetable oils.  Have meat as a side dish, and have vegetables or grains as your main dish. This means having meat in small portions or adding  small amounts of meat to foods like pasta or stew.  Use beans or vegetables instead of meat in common dishes like chili or lasagna.  [place] with different cooking methods. Try roasting or broiling vegetables instead of steaming or sautéeing them.  Add frozen vegetables to soups, stews, pasta, or rice.  Add nuts or seeds for added healthy fat at each meal. You can add these to yogurt, salads, or vegetable dishes.  Marinate fish or vegetables using olive oil, lemon juice, garlic, and fresh herbs.  Meal planning       Plan to eat 1 vegetarian meal one day each week. Try to work up to 2 vegetarian meals, if possible.  Eat seafood 2 or more times a week.  Have healthy snacks readily available, such as:  Vegetable sticks with hummus.  Greek yogurt.  Fruit and nut trail mix.  Eat balanced meals throughout the week. This includes:  Fruit: 2-3 servings a day  Vegetables: 4-5 servings a day  Low-fat dairy: 2 servings a day  Fish, poultry, or lean meat: 1 serving a day  Beans and legumes: 2 or more servings a week  Nuts and seeds: 1-2 servings a day  Whole grains: 6-8 servings a day  Extra-virgin olive oil: 3-4 servings a day  Limit red meat and sweets to only a few servings a month     What are my food choices?  Mediterranean diet  Recommended  Grains: Whole-grain pasta. Brown rice. Bulgar wheat. Polenta. Couscous. Whole-wheat bread. Oatmeal. Quinoa.  Vegetables: Artichokes. Beets. Broccoli. Cabbage. Carrots. Eggplant. Green beans. Chard. Kale. Spinach. Onions. Leeks. Peas. Squash. Tomatoes. Peppers. Radishes.  Fruits: Apples. Apricots. Avocado. Berries. Bananas. Cherries. Dates. Figs. Grapes. Malick. Melon. Oranges. Peaches. Plums. Pomegranate.  Meats and other protein foods: Beans. Almonds. Sunflower seeds. Pine nuts. Peanuts. Cod. Wild Rose. Scallops. Shrimp. Tuna. Tilapia. Clams. Oysters. Eggs.  Dairy: Low-fat milk. Cheese. Greek yogurt.  Beverages: Water. Red wine. Herbal tea.  Fats and oils: Extra virgin olive oil.  Avocado oil. Grape seed oil.  Sweets and desserts: Greek yogurt with honey. Baked apples. Poached pears. Trail mix.  Seasoning and other foods: Basil. Cilantro. Coriander. Cumin. Mint. Parsley. Espinoza. Rosemary. Tarragon. Garlic. Oregano. Thyme. Pepper. Balsalmic vinegar. Tahini. Hummus. Tomato sauce. Olives. Mushrooms.  Limit these  Grains: Prepackaged pasta or rice dishes. Prepackaged cereal with added sugar.  Vegetables: Deep fried potatoes (french fries).  Fruits: Fruit canned in syrup.  Meats and other protein foods: Beef. Pork. Lamb. Poultry with skin. Hot dogs. Baugh.  Dairy: Ice cream. Sour cream. Whole milk.  Beverages: Juice. Sugar-sweetened soft drinks. Beer. Liquor and spirits.  Fats and oils: Butter. Canola oil. Vegetable oil. Beef fat (tallow). Lard.  Sweets and desserts: Cookies. Cakes. Pies. Candy.  Seasoning and other foods: Mayonnaise. Premade sauces and marinades.  The items listed may not be a complete list. Talk with your dietitian about what dietary choices are right for you.  Summary  The Mediterranean diet includes both food and lifestyle choices.  Eat a variety of fresh fruits and vegetables, beans, nuts, seeds, and whole grains.  Limit the amount of red meat and sweets that you eat.  Talk with your health care provider about whether it is safe for you to drink red wine in moderation. This means 1 glass a day for nonpregnant women and 2 glasses a day for men. A glass of wine equals 5 oz (150 mL).  This information is not intended to replace advice given to you by your health care provider. Make sure you discuss any questions you have with your health care provider.  Document Revised: 08/17/2017 Document Reviewed: 08/10/2017  Elsevier Patient Education © 2020 [employer]         Exercising to Stay Healthy  To become healthy and stay healthy, it is recommended that you do moderate-intensity and vigorous-intensity exercise. You can tell that you are exercising at a moderate intensity if your  heart starts beating faster and you start breathing faster but can still hold a conversation. You can tell that you are exercising at a vigorous intensity if you are breathing much harder and faster and cannot hold a conversation while exercising.  Exercising regularly is important. It has many health benefits, such as:  Improving overall fitness, flexibility, and endurance.  Increasing bone density.  Helping with weight control.  Decreasing body fat.  Increasing muscle strength.  Reducing stress and tension.  Improving overall health.  How often should I exercise?  Choose an activity that you enjoy, and set realistic goals. Your health care provider can help you make an activity plan that works for you.  Exercise regularly as told by your health care provider. This may include:  Doing strength training two times a week, such as:  Lifting weights.  Using resistance bands.  Push-ups.  Sit-ups.  Yoga.  Doing a certain intensity of exercise for a given amount of time. Choose from these options:  A total of 150 minutes of moderate-intensity exercise every week.  A total of 75 minutes of vigorous-intensity exercise every week.  A mix of moderate-intensity and vigorous-intensity exercise every week.  Children, pregnant women, people who have not exercised regularly, people who are overweight, and older adults may need to talk with a health care provider about what activities are safe to do. If you have a medical condition, be sure to talk with your health care provider before you start a new exercise program.  What are some exercise ideas?    Moderate-intensity exercise ideas include:  Walking 1 mile (1.6 km) in about 15 minutes.  Biking.  Hiking.  Golfing.  Dancing.  Water aerobics.  Vigorous-intensity exercise ideas include:  Walking 4.5 miles (7.2 km) or more in about 1 hour.  Jogging or running 5 miles (8 km) in about 1 hour.  Biking 10 miles (16.1 km) or more in about 1 hour.  Lap swimming.  Roller-skating or in-line  skating.  Cross-country skiing.  Vigorous competitive sports, such as football, basketball, and soccer.  Jumping rope.  Aerobic dancing.  What are some everyday activities that can help me to get exercise?  Yard work, such as:  Pushing a .  Raking and bagging leaves.  Washing your car.  Pushing a stroller.  Shoveling snow.  Gardening.  Washing windows or floors.  How can I be more active in my day-to-day activities?  Use stairs instead of an elevator.  Take a walk during your lunch break.  If you drive, park your car farther away from your work or school.  If you take public transportation, get off one stop early and walk the rest of the way.  Stand up or walk around during all of your indoor phone calls.  Get up, stretch, and walk around every 30 minutes throughout the day.  Enjoy exercise with a friend. Support to continue exercising will help you keep a regular routine of activity.  What guidelines can I follow while exercising?  Before you start a new exercise program, talk with your health care provider.  Do not exercise so much that you hurt yourself, feel dizzy, or get very short of breath.  Wear comfortable clothes and wear shoes with good support.  Drink plenty of water while you exercise to prevent dehydration or heat stroke.  Work out until your breathing and your heartbeat get faster.  Where to find more information  [place] Department of Health and Human Services: www.hhs.gov  Centers for Disease Control and Prevention (CDC): www.cdc.gov  Summary  Exercising regularly is important. It will improve your overall fitness, flexibility, and endurance.  Regular exercise also will improve your overall health. It can help you control your weight, reduce stress, and improve your bone density.  Do not exercise so much that you hurt yourself, feel dizzy, or get very short of breath.  Before you start a new exercise program, talk with your health care provider.  This information is not intended to replace  advice given to you by your health care provider. Make sure you discuss any questions you have with your health care provider.  Document Revised: 11/30/2018 Document Reviewed: 11/08/2018  Elsevier Patient Education © 2021 [employer]           Mindfulness-Based Stress Reduction  Mindfulness-based stress reduction (MBSR) is a program that helps people learn to practice mindfulness. Mindfulness is the practice of intentionally paying attention to the present moment. It can be learned and practiced through techniques such as education, breathing exercises, meditation, and yoga. MBSR includes several mindfulness techniques in one program.  MBSR works best when you understand the treatment, are willing to try new things, and can commit to spending time practicing what you learn. MBSR training may include learning about:  How your emotions, thoughts, and reactions affect your body.  New ways to respond to things that cause negative thoughts to start (triggers).  How to notice your thoughts and let go of them.  Practicing awareness of everyday things that you normally do without thinking.  The techniques and goals of different types of meditation.  What are the benefits of MBSR?  MBSR can have many benefits, which include helping you to:  Develop self-awareness. This refers to knowing and understanding yourself.  Learn skills and attitudes that help you to participate in your own health care.  Learn new ways to care for yourself.  Be more accepting about how things are, and let things go.  Be less judgmental and approach things with an open mind.  Be patient with yourself and trust yourself more.  MBSR has also been shown to:  Reduce negative emotions, such as depression and anxiety.  Improve memory and focus.  Change how you sense and approach pain.  Boost your body's ability to fight infections.  Help you connect better with other people.  Improve your sense of well-being.  Follow these instructions at home:       Find  a local in-person or online MBSR program.  Set aside some time regularly for mindfulness practice.  Find a mindfulness practice that works best for you. This may include one or more of the following:  Meditation. Meditation involves focusing your mind on a certain thought or activity.  Breathing awareness exercises. These help you to stay present by focusing on your breath.  Body scan. For this practice, you lie down and pay attention to each part of your body from head to toe. You can identify tension and soreness and intentionally relax parts of your body.  Yoga. Yoga involves stretching and breathing, and it can improve your ability to move and be flexible. It can also provide an experience of testing your body's limits, which can help you release stress.  Mindful eating. This way of eating involves focusing on the taste, texture, color, and smell of each bite of food. Because this slows down eating and helps you feel full sooner, it can be an important part of a weight-loss plan.  Find a podcast or recording that provides guidance for breathing awareness, body scan, or meditation exercises. You can listen to these any time when you have a free moment to rest without distractions.  Follow your treatment plan as told by your health care provider. This may include taking regular medicines and making changes to your diet or lifestyle as recommended.  How to practice mindfulness  To do a basic awareness exercise:  Find a comfortable place to sit.  Pay attention to the present moment. Observe your thoughts, feelings, and surroundings just as they are.  Avoid placing judgment on yourself, your feelings, or your surroundings. Make note of any judgment that comes up, and let it go.  Your mind may wander, and that is okay. Make note of when your thoughts drift, and return your attention to the present moment.  To do basic mindfulness meditation:  Find a comfortable place to sit. This may include a stable chair or a firm  floor cushion.  Sit upright with your back straight. Let your arms fall next to your side with your hands resting on your legs.  If sitting in a chair, rest your feet flat on the floor.  If sitting on a cushion, cross your legs in front of you.  Keep your head in a neutral position with your chin dropped slightly. Relax your jaw and rest the tip of your tongue on the roof of your mouth. Drop your gaze to the floor. You can close your eyes if you like.  Breathe normally and pay attention to your breath. Feel the air moving in and out of your nose. Feel your belly expanding and relaxing with each breath.  Your mind may wander, and that is okay. Make note of when your thoughts drift, and return your attention to your breath.  Avoid placing judgment on yourself, your feelings, or your surroundings. Make note of any judgment or feelings that come up, let them go, and bring your attention back to your breath.  When you are ready, lift your gaze or open your eyes. Pay attention to how your body feels after the meditation.  Where to find more information  You can find more information about MBSR from:  Your health care provider.  Community-based meditation centers or programs.  Programs offered near you.  Summary  Mindfulness-based stress reduction (MBSR) is a program that teaches you how to intentionally pay attention to the present moment. It is used with other treatments to help you cope better with daily stress, emotions, and pain.  MBSR focuses on developing self-awareness, which allows you to respond to life stress without judgment or negative emotions.  MBSR programs may involve learning different mindfulness practices, such as breathing exercises, meditation, yoga, body scan, or mindful eating. Find a mindfulness practice that works best for you, and set aside time for it on a regular basis.  This information is not intended to replace advice given to you by your health care provider. Make sure you discuss any  questions you have with your health care provider.  Document Revised: 02/22/2021 Document Reviewed: 04/26/2018  Elsevier Patient Education © 2021 Elsevier Inc.

## 2023-04-20 NOTE — PROGRESS NOTES
"Chief Complaint  Leg Pain (Bilateral. Duration of 2 weeks. ), Leg Swelling (Bilateral. Duration of 2 weeks. ), and Skin Discoloratin (Bilateral. Duration of 2 weeks. )    Subjective          Jose Luis Casas presents to [facility]  History of Present Illness    Here with wife.    Here with complaints of two weeks of bilateral lower extremity pain, redness and swelling.  Reports stabbing pain concentrated in the red areas located on both lower extremities.    Reports that when not in pain, BP at home has been running 117-127 systolic.    Current Outpatient Medications   Medication Instructions   • atorvastatin (LIPITOR) 40 mg, Oral, Daily   • carvedilol (COREG) 12.5 mg, Oral, 2 Times Daily With Meals   • Cholecalciferol 125 mcg, Oral, Daily   • doxycycline (VIBRAMYCIN) 100 mg, Oral, 2 Times Daily   • fluticasone (FLONASE) 50 MCG/ACT nasal spray 2 sprays, Nasal, Daily   • furosemide (LASIX) 40 mg, Oral, Daily   • tamsulosin (FLOMAX) 0.4 mg, Oral, Daily   • Vibegron 75 mg, Oral, Daily     The following portions of the patient's history were reviewed and updated as appropriate: allergies, current medications, past family history, past medical history, past social history, past surgical history, and problem list.    Objective   Vital Signs:   /94 (BP Location: Left arm, Patient Position: Sitting, Cuff Size: Adult)   Pulse 74   Temp 98.4 °F (36.9 °C) (Temporal)   Ht 175.3 cm (69\")   Wt 103 kg (227 lb)   SpO2 97%   BMI 33.52 kg/m²     Wt Readings from Last 3 Encounters:   04/20/23 103 kg (227 lb)   11/02/22 102 kg (225 lb 6.4 oz)   08/01/22 98 kg (216 lb)     BP Readings from Last 3 Encounters:   04/20/23 164/94   11/02/22 125/84   08/01/22 144/85     Physical Exam  Vitals reviewed.   Constitutional:       General: He is not in acute distress.     Appearance: Normal appearance. He is not ill-appearing, toxic-appearing or diaphoretic.   HENT:      Head: Normocephalic " and atraumatic.      Right Ear: External ear normal.      Left Ear: External ear normal.   Eyes:      Conjunctiva/sclera: Conjunctivae normal.   Cardiovascular:      Rate and Rhythm: Normal rate and regular rhythm.      Pulses: Normal pulses.      Heart sounds: Normal heart sounds. No murmur heard.    No friction rub. No gallop.   Pulmonary:      Effort: Pulmonary effort is normal. No respiratory distress.      Breath sounds: Normal breath sounds. No stridor. No wheezing, rhonchi or rales.   Chest:      Chest wall: No tenderness.   Abdominal:      General: Abdomen is flat.      Palpations: Abdomen is soft. There is no mass.      Tenderness: There is no abdominal tenderness.   Musculoskeletal:      Right lower leg: Edema present.      Left lower leg: Edema present.      Comments: 2+ LE edema bilaterally.  Erythematous patches on bilateral ankles.  No warmth.   Skin:     General: Skin is warm and dry.   Neurological:      General: No focal deficit present.      Mental Status: He is alert. Mental status is at baseline.   Psychiatric:         Mood and Affect: Mood normal.         Behavior: Behavior normal.         Thought Content: Thought content normal.         Judgment: Judgment normal.            Result Review :   The following data was reviewed by: Sid Arango MD on 04/20/2023:  Common labs        12/13/2022    07:51 3/7/2023    10:44 3/27/2023    12:19 3/27/2023    12:23   Common Labs   Glucose 86   89   111      BUN 10   14   11      Creatinine 0.76   0.90   0.80      Sodium 131   131   137      Potassium 4.5   4.7   4.7      Chloride 96   96   101      Calcium 9.4   10.2   9.9      Albumin   4.6      Total Bilirubin   0.4      Alkaline Phosphatase   61      AST (SGOT)   23      ALT (SGPT)   26      WBC   6.53      Hemoglobin   14.7      Hematocrit   43.2      Platelets   302      Microalbumin, Urine    1.5              Lab Results   Component Value Date    COVID19 NOT DETECTED 12/05/2020    INR 0.93 (L)  03/23/2021    BILIRUBINUR Negative 03/27/2023       Procedures        Assessment and Plan    Diagnoses and all orders for this visit:    1. Cellulitis of lower extremity, unspecified laterality (Primary)  -     doxycycline (VIBRAMYCIN) 100 MG capsule; Take 1 capsule by mouth 2 (Two) Times a Day for 5 days.  Dispense: 10 capsule; Refill: 0    2. Lower extremity edema  -     furosemide (Lasix) 40 MG tablet; Take 1 tablet by mouth Daily for 7 days.  Dispense: 7 tablet; Refill: 0    3. Essential hypertension    4. Hyponatremia      Cellulitis, LE Edema:  -overall, I do suspect stasis dermatitis rather than cellulitis  -however, given acuity of pain I am agreeable to a trial of antibiotic  -in addition, I will trial a short course of lasix for his swelling - he will see me again on 5/8/23    HTN:  -elevated today, which I suspect is 2/2 to LE pain  -medicines were adjusted by nephrology in the interim    Hyponatremia:  -March labs show sodium WNL    Medications Discontinued During This Encounter   Medication Reason   • eplerenone (INSPRA) 50 MG tablet    • hydrALAZINE (APRESOLINE) 10 MG tablet    • losartan (COZAAR) 50 MG tablet    • omeprazole (priLOSEC) 20 MG capsule           Follow Up   Return if symptoms worsen or fail to improve.  Patient was given instructions and counseling regarding his condition or for health maintenance advice. Please see specific information pulled into the AVS if appropriate.       Sid Arango MD  04/20/23  12:04 EDT

## 2023-04-26 DIAGNOSIS — R60.0 LOWER EXTREMITY EDEMA: Primary | ICD-10-CM

## 2023-04-28 ENCOUNTER — HOSPITAL ENCOUNTER (OUTPATIENT)
Dept: CARDIOLOGY | Facility: HOSPITAL | Age: 58
Discharge: HOME | End: 2023-04-28
Payer: COMMERCIAL

## 2023-04-28 DIAGNOSIS — R60.0 LOWER EXTREMITY EDEMA: ICD-10-CM

## 2023-04-28 LAB
BH CV LOWER VASCULAR LEFT COMMON FEMORAL AUGMENT: NORMAL
BH CV LOWER VASCULAR LEFT COMMON FEMORAL COMPETENT: NORMAL
BH CV LOWER VASCULAR LEFT COMMON FEMORAL COMPRESS: NORMAL
BH CV LOWER VASCULAR LEFT COMMON FEMORAL PHASIC: NORMAL
BH CV LOWER VASCULAR LEFT COMMON FEMORAL SPONT: NORMAL
BH CV LOWER VASCULAR RIGHT COMMON FEMORAL AUGMENT: NORMAL
BH CV LOWER VASCULAR RIGHT COMMON FEMORAL COMPETENT: NORMAL
BH CV LOWER VASCULAR RIGHT COMMON FEMORAL COMPRESS: NORMAL
BH CV LOWER VASCULAR RIGHT COMMON FEMORAL PHASIC: NORMAL
BH CV LOWER VASCULAR RIGHT COMMON FEMORAL SPONT: NORMAL
BH CV LOWER VASCULAR RIGHT DISTAL FEMORAL COMPRESS: NORMAL
BH CV LOWER VASCULAR RIGHT GASTRONEMIUS COMPRESS: NORMAL
BH CV LOWER VASCULAR RIGHT GREATER SAPH AK COMPRESS: NORMAL
BH CV LOWER VASCULAR RIGHT GREATER SAPH BK COMPRESS: NORMAL
BH CV LOWER VASCULAR RIGHT LESSER SAPH COMPRESS: NORMAL
BH CV LOWER VASCULAR RIGHT MID FEMORAL AUGMENT: NORMAL
BH CV LOWER VASCULAR RIGHT MID FEMORAL COMPETENT: NORMAL
BH CV LOWER VASCULAR RIGHT MID FEMORAL COMPRESS: NORMAL
BH CV LOWER VASCULAR RIGHT MID FEMORAL PHASIC: NORMAL
BH CV LOWER VASCULAR RIGHT MID FEMORAL SPONT: NORMAL
BH CV LOWER VASCULAR RIGHT PERONEAL COMPRESS: NORMAL
BH CV LOWER VASCULAR RIGHT POPLITEAL AUGMENT: NORMAL
BH CV LOWER VASCULAR RIGHT POPLITEAL COMPETENT: NORMAL
BH CV LOWER VASCULAR RIGHT POPLITEAL COMPRESS: NORMAL
BH CV LOWER VASCULAR RIGHT POPLITEAL PHASIC: NORMAL
BH CV LOWER VASCULAR RIGHT POPLITEAL SPONT: NORMAL
BH CV LOWER VASCULAR RIGHT POSTERIOR TIBIAL COMPRESS: NORMAL
BH CV LOWER VASCULAR RIGHT PROXIMAL FEMORAL COMPRESS: NORMAL
BH CV LOWER VASCULAR RIGHT SAPHENOFEMORAL JUNCTION COMPRESS: NORMAL
MAXIMAL PREDICTED HEART RATE: 164 BPM
STRESS TARGET HR: 139 BPM

## 2023-04-28 PROCEDURE — 93971 EXTREMITY STUDY: CPT

## 2023-05-01 ENCOUNTER — HOSPITAL ENCOUNTER (EMERGENCY)
Facility: HOSPITAL | Age: 58
Discharge: HOME | End: 2023-05-01
Attending: EMERGENCY MEDICINE | Admitting: EMERGENCY MEDICINE
Payer: COMMERCIAL

## 2023-05-01 ENCOUNTER — APPOINTMENT (OUTPATIENT)
Dept: GENERAL RADIOLOGY | Facility: HOSPITAL | Age: 58
End: 2023-05-01
Payer: COMMERCIAL

## 2023-05-01 VITALS
HEIGHT: 70 IN | RESPIRATION RATE: 18 BRPM | OXYGEN SATURATION: 98 % | SYSTOLIC BLOOD PRESSURE: 168 MMHG | BODY MASS INDEX: 32.19 KG/M2 | WEIGHT: 224.87 LBS | TEMPERATURE: 97.2 F | DIASTOLIC BLOOD PRESSURE: 93 MMHG | HEART RATE: 68 BPM

## 2023-05-01 DIAGNOSIS — L03.115 BILATERAL LOWER LEG CELLULITIS: ICD-10-CM

## 2023-05-01 DIAGNOSIS — R60.9 PERIPHERAL EDEMA: ICD-10-CM

## 2023-05-01 DIAGNOSIS — R60.0 LOWER EXTREMITY EDEMA: ICD-10-CM

## 2023-05-01 DIAGNOSIS — R06.09 DYSPNEA ON EXERTION: Primary | ICD-10-CM

## 2023-05-01 DIAGNOSIS — L03.116 BILATERAL LOWER LEG CELLULITIS: ICD-10-CM

## 2023-05-01 DIAGNOSIS — I10 HYPERTENSION, UNSPECIFIED TYPE: Primary | ICD-10-CM

## 2023-05-01 LAB
ALBUMIN SERPL-MCNC: 4.4 G/DL (ref 3.5–5.2)
ALBUMIN/GLOB SERPL: 1.8 G/DL
ALP SERPL-CCNC: 62 U/L (ref 39–117)
ALT SERPL W P-5'-P-CCNC: 23 U/L (ref 1–41)
ANION GAP SERPL CALCULATED.3IONS-SCNC: 10 MMOL/L (ref 5–15)
APTT PPP: 29.4 SECONDS (ref 22.7–35.4)
AST SERPL-CCNC: 19 U/L (ref 1–40)
BASOPHILS # BLD AUTO: 0.03 10*3/MM3 (ref 0–0.2)
BASOPHILS NFR BLD AUTO: 0.4 % (ref 0–1.5)
BILIRUB SERPL-MCNC: 0.6 MG/DL (ref 0–1.2)
BUN SERPL-MCNC: 11 MG/DL (ref 6–20)
BUN/CREAT SERPL: 12.8 (ref 7–25)
CALCIUM SPEC-SCNC: 9.9 MG/DL (ref 8.6–10.5)
CHLORIDE SERPL-SCNC: 99 MMOL/L (ref 98–107)
CO2 SERPL-SCNC: 24 MMOL/L (ref 22–29)
CREAT SERPL-MCNC: 0.86 MG/DL (ref 0.76–1.27)
DEPRECATED RDW RBC AUTO: 42.5 FL (ref 37–54)
EGFRCR SERPLBLD CKD-EPI 2021: 101.6 ML/MIN/1.73
EOSINOPHIL # BLD AUTO: 0.27 10*3/MM3 (ref 0–0.4)
EOSINOPHIL NFR BLD AUTO: 3.5 % (ref 0.3–6.2)
ERYTHROCYTE [DISTWIDTH] IN BLOOD BY AUTOMATED COUNT: 12.4 % (ref 12.3–15.4)
GLOBULIN UR ELPH-MCNC: 2.5 GM/DL
GLUCOSE SERPL-MCNC: 96 MG/DL (ref 65–99)
HCT VFR BLD AUTO: 43.1 % (ref 37.5–51)
HGB BLD-MCNC: 14.3 G/DL (ref 13–17.7)
IMM GRANULOCYTES # BLD AUTO: 0.02 10*3/MM3 (ref 0–0.05)
IMM GRANULOCYTES NFR BLD AUTO: 0.3 % (ref 0–0.5)
INR PPP: 0.97 (ref 0.9–1.1)
LYMPHOCYTES # BLD AUTO: 2.3 10*3/MM3 (ref 0.7–3.1)
LYMPHOCYTES NFR BLD AUTO: 30 % (ref 19.6–45.3)
MCH RBC QN AUTO: 31 PG (ref 26.6–33)
MCHC RBC AUTO-ENTMCNC: 33.2 G/DL (ref 31.5–35.7)
MCV RBC AUTO: 93.5 FL (ref 79–97)
MONOCYTES # BLD AUTO: 0.76 10*3/MM3 (ref 0.1–0.9)
MONOCYTES NFR BLD AUTO: 9.9 % (ref 5–12)
NEUTROPHILS NFR BLD AUTO: 4.29 10*3/MM3 (ref 1.7–7)
NEUTROPHILS NFR BLD AUTO: 55.9 % (ref 42.7–76)
NRBC BLD AUTO-RTO: 0 /100 WBC (ref 0–0.2)
NT-PROBNP SERPL-MCNC: 24.9 PG/ML (ref 0–900)
PLATELET # BLD AUTO: 274 10*3/MM3 (ref 140–450)
PMV BLD AUTO: 9 FL (ref 6–12)
POTASSIUM SERPL-SCNC: 4.4 MMOL/L (ref 3.5–5.2)
PROT SERPL-MCNC: 6.9 G/DL (ref 6–8.5)
PROTHROMBIN TIME: 13 SECONDS (ref 11.7–14.2)
RBC # BLD AUTO: 4.61 10*6/MM3 (ref 4.14–5.8)
SODIUM SERPL-SCNC: 133 MMOL/L (ref 136–145)
TROPONIN T SERPL HS-MCNC: 8 NG/L
WBC NRBC COR # BLD: 7.67 10*3/MM3 (ref 3.4–10.8)

## 2023-05-01 PROCEDURE — 85730 THROMBOPLASTIN TIME PARTIAL: CPT | Performed by: EMERGENCY MEDICINE

## 2023-05-01 PROCEDURE — 93010 ELECTROCARDIOGRAM REPORT: CPT | Performed by: INTERNAL MEDICINE

## 2023-05-01 PROCEDURE — 71045 X-RAY EXAM CHEST 1 VIEW: CPT

## 2023-05-01 PROCEDURE — 85025 COMPLETE CBC W/AUTO DIFF WBC: CPT | Performed by: EMERGENCY MEDICINE

## 2023-05-01 PROCEDURE — 83880 ASSAY OF NATRIURETIC PEPTIDE: CPT | Performed by: EMERGENCY MEDICINE

## 2023-05-01 PROCEDURE — 93005 ELECTROCARDIOGRAM TRACING: CPT | Performed by: EMERGENCY MEDICINE

## 2023-05-01 PROCEDURE — 99284 EMERGENCY DEPT VISIT MOD MDM: CPT

## 2023-05-01 PROCEDURE — 84484 ASSAY OF TROPONIN QUANT: CPT | Performed by: EMERGENCY MEDICINE

## 2023-05-01 PROCEDURE — 80053 COMPREHEN METABOLIC PANEL: CPT | Performed by: EMERGENCY MEDICINE

## 2023-05-01 PROCEDURE — 85610 PROTHROMBIN TIME: CPT | Performed by: EMERGENCY MEDICINE

## 2023-05-01 PROCEDURE — 93005 ELECTROCARDIOGRAM TRACING: CPT

## 2023-05-01 RX ORDER — FUROSEMIDE 20 MG/1
20 TABLET ORAL DAILY
Qty: 14 TABLET | Refills: 0 | Status: SHIPPED | OUTPATIENT
Start: 2023-05-01 | End: 2023-05-15

## 2023-05-01 RX ORDER — CARVEDILOL 25 MG/1
25 TABLET ORAL 2 TIMES DAILY WITH MEALS
Qty: 60 TABLET | Refills: 0 | Status: SHIPPED | OUTPATIENT
Start: 2023-05-01 | End: 2023-05-08 | Stop reason: SDUPTHER

## 2023-05-01 RX ORDER — SODIUM CHLORIDE 0.9 % (FLUSH) 0.9 %
10 SYRINGE (ML) INJECTION AS NEEDED
Status: DISCONTINUED | OUTPATIENT
Start: 2023-05-01 | End: 2023-05-01 | Stop reason: HOSPADM

## 2023-05-01 RX ADMIN — Medication 10 ML: at 11:27

## 2023-05-01 NOTE — DISCHARGE INSTRUCTIONS
Keep legs elevated whenever possible.  Please return to the emergency room for any worsening symptoms or concerns

## 2023-05-01 NOTE — ED PROVIDER NOTES
EMERGENCY DEPARTMENT ENCOUNTER    Room Number:  19/19  Date seen:  5/1/2023  PCP: Sid Arango MD  Historian(s): Patient and his wife      HPI:  Chief Complaint: Leg swelling, high blood pressure, dyspnea  A complete HPI/ROS/PMH/PSH/SH/FH are unobtainable / limited due to: N/A  Context: Jose Luis Casas is a 57 y.o. male who presents to the ED directly from vascular surgery office for evaluation of elevated blood pressure.  Patient has been dealing with some persistent swelling in his legs and some concerns for cellulitis in his legs that has been going on for several weeks now.  His PCP is in [place].  Patient does not have a cardiologist.  He was referred to a vascular specialist here in [place] today.  When he went there this morning for evaluation, they noted his blood pressure was rather elevated in the 170s range systolic.  Patient reported some feeling of shortness of breath as well.  He does not have any chest pain at this time.  He did have some chest tightness earlier this morning, though.  The staff at the vascular surgery clinic was concerned about these symptoms in the context of his elevated blood pressure and therefore insisted he should come here for further evaluation today        PAST MEDICAL HISTORY  Active Ambulatory Problems     Diagnosis Date Noted   • Essential hypertension 07/07/2021   • Hyperlipidemia 07/07/2021   • Hiatal hernia 07/07/2021   • Class 1 obesity due to excess calories without serious comorbidity with body mass index (BMI) of 32.0 to 32.9 in adult 07/07/2021   • Fatigue 07/07/2021   • Tinnitus 04/06/2022   • Esophageal reflux 04/06/2022   • Edema 04/06/2022   • Degenerative disc disease, lumbar 03/16/2016   • Hydrocele 04/06/2022   • Paresis of single lower extremity 03/16/2016   • Gynecomastia, male 08/01/2022   • Benign prostatic hyperplasia without lower urinary tract symptoms 08/01/2022     Resolved Ambulatory Problems     Diagnosis Date Noted   • No Resolved  Ambulatory Problems     Past Medical History:   Diagnosis Date   • Edema, lower extremity    • GERD (gastroesophageal reflux disease)    • High cholesterol 2014   • History of COVID-19 2022   • Screening PSA (prostate specific antigen) 10/28/2016   • Tinnitus, bilateral    • Weakness of left lower extremity 2016         PAST SURGICAL HISTORY  Past Surgical History:   Procedure Laterality Date   • COLONOSCOPY  2017   • COLONOSCOPY N/A 2021    Procedure: COLONOSCOPY;  Surgeon: Antonette Browning MD;  Location: [facility] ENDOSCOPY;  Service: Gastroenterology;  Laterality: N/A;  COLON POLYPS   • HYDROCELECTOMY  2015    x2         FAMILY HISTORY  Family History   Problem Relation Age of Onset   • Dementia Mother    • Epilepsy Mother    • Hypertension Mother    • Hyperlipidemia Mother    • Heart disease Father    • Diabetes Father    • Hypertension Father    • Hyperlipidemia Father    • Breast cancer Maternal Grandmother    • Breast cancer Paternal Grandmother    • Hypertension Paternal Grandmother    • Hyperlipidemia Paternal Grandmother    • Other Paternal Grandmother         Colon cancer   • Stroke Other         Paternal grandparent   • Malig Hyperthermia Neg Hx          SOCIAL HISTORY  Social History     Socioeconomic History   • Marital status:    Tobacco Use   • Smoking status: Former     Packs/day: 0.25     Years: 30.00     Pack years: 7.50     Types: Cigarettes     Quit date:      Years since quittin.3   • Smokeless tobacco: Never   • Tobacco comments:     Was never heavy (so 0/packs per day) but rather a social smoker.   Vaping Use   • Vaping Use: Former   • Substances: Nicotine   • Devices: Disposable, Pre-filled pod   Substance and Sexual Activity   • Alcohol use: Yes     Alcohol/week: 0.0 standard drinks     Comment: When out for a dinner with friends, wine or cocktail.   • Drug use: Never   • Sexual activity: Yes     Partners: Female     Birth control/protection:  None         ALLERGIES  Patient has no known allergies.        REVIEW OF SYSTEMS  Review of Systems   Constitutional: Negative for activity change and fever.   HENT: Negative.    Eyes: Negative for pain and visual disturbance.   Respiratory: Positive for chest tightness and shortness of breath. Negative for cough.    Cardiovascular: Positive for leg swelling. Negative for chest pain.   Gastrointestinal: Negative for abdominal pain and vomiting.   Skin: Negative for color change.   Neurological: Negative for syncope and headaches.   All other systems reviewed and are negative.           PHYSICAL EXAM  ED Triage Vitals [05/01/23 1039]   Temp Heart Rate Resp BP SpO2   97.2 °F (36.2 °C) 87 18 -- 98 %      Temp src Heart Rate Source Patient Position BP Location FiO2 (%)   -- -- -- -- --       Physical Exam      GENERAL: Pleasant gentleman, calm, no diaphoresis, no acute distress  HENT: nares patent, normocephalic and atraumatic  EYES: no scleral icterus, EOMI, normal conjunctiva  CV: regular rhythm, normal rate, normal distal pulses  RESPIRATORY: normal effort, no stridor, lungs clear to auscultation bilaterally  ABDOMEN: soft, nontender  MUSCULOSKELETAL: no deformity, moderate soft tissue edema noted to the bilateral lower extremities symmetrically.  Minimal erythematous change to the distal anterior aspect of the bilateral lower tibias.  No fluctuance.  Not hot to touch.  NEURO: alert, moves all extremities, follows commands  PSYCH:  calm, cooperative  SKIN: warm, dry    Vital signs and nursing notes reviewed.        LAB RESULTS  Recent Results (from the past 24 hour(s))   ECG 12 Lead Chest Pain    Collection Time: 05/01/23 10:46 AM   Result Value Ref Range    QT Interval 387 ms   Comprehensive Metabolic Panel    Collection Time: 05/01/23 11:26 AM    Specimen: Blood   Result Value Ref Range    Glucose 96 65 - 99 mg/dL    BUN 11 6 - 20 mg/dL    Creatinine 0.86 0.76 - 1.27 mg/dL    Sodium 133 (L) 136 - 145 mmol/L     Potassium 4.4 3.5 - 5.2 mmol/L    Chloride 99 98 - 107 mmol/L    CO2 24.0 22.0 - 29.0 mmol/L    Calcium 9.9 8.6 - 10.5 mg/dL    Total Protein 6.9 6.0 - 8.5 g/dL    Albumin 4.4 3.5 - 5.2 g/dL    ALT (SGPT) 23 1 - 41 U/L    AST (SGOT) 19 1 - 40 U/L    Alkaline Phosphatase 62 39 - 117 U/L    Total Bilirubin 0.6 0.0 - 1.2 mg/dL    Globulin 2.5 gm/dL    A/G Ratio 1.8 g/dL    BUN/Creatinine Ratio 12.8 7.0 - 25.0    Anion Gap 10.0 5.0 - 15.0 mmol/L    eGFR 101.6 >60.0 mL/min/1.73   Protime-INR    Collection Time: 05/01/23 11:26 AM    Specimen: Blood   Result Value Ref Range    Protime 13.0 11.7 - 14.2 Seconds    INR 0.97 0.90 - 1.10   aPTT    Collection Time: 05/01/23 11:26 AM    Specimen: Blood   Result Value Ref Range    PTT 29.4 22.7 - 35.4 seconds   BNP    Collection Time: 05/01/23 11:26 AM    Specimen: Blood   Result Value Ref Range    proBNP 24.9 0.0 - 900.0 pg/mL   Single High Sensitivity Troponin T    Collection Time: 05/01/23 11:26 AM    Specimen: Blood   Result Value Ref Range    HS Troponin T 8 <15 ng/L   CBC Auto Differential    Collection Time: 05/01/23 11:26 AM    Specimen: Blood   Result Value Ref Range    WBC 7.67 3.40 - 10.80 10*3/mm3    RBC 4.61 4.14 - 5.80 10*6/mm3    Hemoglobin 14.3 13.0 - 17.7 g/dL    Hematocrit 43.1 37.5 - 51.0 %    MCV 93.5 79.0 - 97.0 fL    MCH 31.0 26.6 - 33.0 pg    MCHC 33.2 31.5 - 35.7 g/dL    RDW 12.4 12.3 - 15.4 %    RDW-SD 42.5 37.0 - 54.0 fl    MPV 9.0 6.0 - 12.0 fL    Platelets 274 140 - 450 10*3/mm3    Neutrophil % 55.9 42.7 - 76.0 %    Lymphocyte % 30.0 19.6 - 45.3 %    Monocyte % 9.9 5.0 - 12.0 %    Eosinophil % 3.5 0.3 - 6.2 %    Basophil % 0.4 0.0 - 1.5 %    Immature Grans % 0.3 0.0 - 0.5 %    Neutrophils, Absolute 4.29 1.70 - 7.00 10*3/mm3    Lymphocytes, Absolute 2.30 0.70 - 3.10 10*3/mm3    Monocytes, Absolute 0.76 0.10 - 0.90 10*3/mm3    Eosinophils, Absolute 0.27 0.00 - 0.40 10*3/mm3    Basophils, Absolute 0.03 0.00 - 0.20 10*3/mm3    Immature Grans, Absolute 0.02  0.00 - 0.05 10*3/mm3    nRBC 0.0 0.0 - 0.2 /100 WBC       Ordered the above labs and reviewed the results.        RADIOLOGY  XR Chest 1 View    Result Date: 5/1/2023  XR CHEST 1 VW-clinical: Dyspnea  FINDINGS: Cardiac size within normal limits. No mediastinal or hilar abnormality. Lungs clear. No effusion or edema.  CONCLUSION: No active disease of the chest  This report was finalized on 5/1/2023 11:41 AM by Dr. Claudio Tam M.D.        Ordered the above noted radiological studies. Reviewed by me in PACS.        PROCEDURES  Procedures    EKG           EKG time/Interp time: 1046/1049  Rhythm/Rate: Sinus rhythm, 66 bpm  P waves and AR: Present, 164 ms  QRS, axis: 85 ms, normal axis  ST and T waves: No ST segment elevations are notable.    Independently interpreted by me contemporaneously with treatment      MEDICATIONS GIVEN IN ER  Medications   sodium chloride 0.9 % flush 10 mL (10 mL Intravenous Given 5/1/23 1127)           MEDICAL DECISION MAKING, PROGRESS, and CONSULTS    All labs have been independently reviewed by me.  All radiology studies have been reviewed by me and I have also reviewed the radiology report.   EKG's independently viewed and interpreted by me.  Discussion below represents my analysis of pertinent findings related to patient's condition, differential diagnosis, treatment plan and final disposition.      Additional sources:  - Discussed/ obtained information from independent historians: None    - External (non-ED) record review: I reviewed the most recent PCP office progress note from April 20, 2023 when patient was evaluated for 2 weeks of bilateral lower extremity pain and redness and swelling at that time.  He was diagnosed with cellulitis and prescribed doxycycline x5 days.  Clinician indicates that he suspect stasis dermatitis rather than cellulitis but given patient's acuity of pain he elected to do a trial of antibiotic he was also prescribed Lasix 40 mg x 7 days.  Essential  hypertension is listed on problem list at that time as well as hyponatremia    - Chronic or social conditions impacting care: None        Orders placed during this visit:  Orders Placed This Encounter   Procedures   • XR Chest 1 View   • Comprehensive Metabolic Panel   • Protime-INR   • aPTT   • BNP   • Single High Sensitivity Troponin T   • CBC Auto Differential   • Monitor Blood Pressure   • Cardiac Monitoring   • Pulse Oximetry, Continuous   • Cardiology (on-call MD unless specified)   • ECG 12 Lead Chest Pain   • Insert Peripheral IV   • CBC & Differential           Differential diagnosis includes but is not limited to:    Cellulitis, DVT, hypertension, hypertensive urgency      Independent interpretation of labs, radiology studies, and discussions with consultants:  ED Course as of 05/01/23 1314   Mon May 01, 2023   1118 Blood pressure is indeed elevated at arrival here.  However, patient does not have any chest pain at this time and his work of breathing is totally normal with normal O2 saturations on room air I will conduct a typical cardiac and metabolic work-up along with chest x-ray imaging.  We will watch his blood pressure carefully before deciding on any additional treatment regimen right now.  I do not have any criteria suggesting hypertensive emergency problem right now so we will just take our time with his work-up while watching the blood pressure pattern here [GELY]   1202 I independently interpreted the chest x-ray and my findings are: No pneumothorax, no infiltrate, no effusion. [GELY]   1233 I have reviewed all the labs from today's visit and satisfied that there is no evidence for acute end organ ischemia or hypertensive emergency problem right now.  I am paging cardiology service to request recommendation for continued hypertension management with any medication changes that might be appropriate. [GELY]   1304 I just discussed with Dr. Gonsales from cardiology about this patient.  He reviewed the  patient's medication list and current labs from today's visit.  He recommends that patient begin taking Coreg 25 mg twice daily and he agrees with plan to have patient take low-dose Lasix daily for the next couple of weeks.  He wants patient to follow-up with his PCP for outpatient lab testing in 1 week to check renal function and potassium level.  He also wants to see the patient in the cardiology office within the next 2 to 4 weeks for further consultation and medical management of his hypertension.  He told me he is going to order an echocardiogram for the patient to be conducted as well [GELY]      ED Course User Index  [GELY] Chang Augustine MD         DIAGNOSIS  Final diagnoses:   Hypertension, unspecified type   Peripheral edema   Bilateral lower leg cellulitis         DISPOSITION  Discharge home        Latest Documented Vital Signs:  As of 13:14 EDT  BP- (!) 178/118 HR- 71 Temp- 97.2 °F (36.2 °C) O2 sat- 100%              --    Please note that portions of this were completed with a voice recognition program.       Note Disclaimer: At [facility], we believe that sharing information builds trust and better relationships. You are receiving this note because you are receiving care at [facility] or recently visited. It is possible you will see health information before a provider has talked with you about it. This kind of information can be easy to misunderstand. To help you fully understand what it means for your health, we urge you to discuss this note with your provider.           Chang Augustine MD  05/01/23 3961

## 2023-05-01 NOTE — ED NOTES
"Pt sent here from vascualr office for eval of SOB, chest tightness, states they noticed her was \"breathing more than he should be at my visit\". Then during eval noted he has had some chest tightness when \"bending over to pick something up in the past. Encouraged to come here for eval. Pt also has been on abx in past for cellulitis, states was not getting much better. Mild pain present to posterior L lower leg. No open wounds noted at this time. Denies \"calf\" pain   "

## 2023-05-01 NOTE — ED NOTES
Patient sent from vascular doctor for shortness of breath, HTN and bilateral leg swelling. Symptoms started a while ago, seen at vascular doctor today and referred to ER.

## 2023-05-02 LAB — QT INTERVAL: 387 MS

## 2023-05-07 NOTE — PATIENT INSTRUCTIONS
Cooking With Less Salt  Cooking with less salt is one way to reduce the amount of sodium you get from food. Sodium is one of the elements that make up salt. It is found naturally in foods and is also added to certain foods. Depending on your condition and overall health, your health care provider or dietitian may recommend that you reduce your sodium intake. Most people should have less than 2,300 milligrams (mg) of sodium each day. If you have high blood pressure (hypertension), you may need to limit your sodium to 1,500 mg each day. Follow the tips below to help reduce your sodium intake.  What are tips for eating less sodium?  Reading food labels       Check the food label before buying or using packaged ingredients. Always check the label for the serving size and sodium content.  Look for products with no more than 140 mg of sodium in one serving.  Check the % Daily Value column to see what percent of the daily recommended amount of sodium is provided in one serving of the product. Foods with 5% or less in this column are considered low in sodium. Foods with 20% or higher are considered high in sodium.  Do not choose foods with salt as one of the first three ingredients on the ingredients list. If salt is one of the first three ingredients, it usually means the item is high in sodium.     Shopping  Buy sodium-free or low-sodium products. Look for the following words on food labels:  Low-sodium.  Sodium-free.  Reduced-sodium.  No salt added.  Unsalted.  Always check the sodium content even if foods are labeled as low-sodium or no salt added.  Buy fresh foods.  Cooking  Use herbs, seasonings without salt, and spices as substitutes for salt.  Use sodium-free baking soda when baking.  [place], braise, or roast foods to add flavor with less salt.  Avoid adding salt to pasta, rice, or hot cereals.  Drain and rinse canned vegetables, beans, and meat before use.  Avoid adding salt when cooking sweets and desserts.  Cook  "with low-sodium ingredients.  What foods are high in sodium?  Vegetables  Regular canned vegetables (not low-sodium or reduced-sodium). Sauerkraut, pickled vegetables, and relishes. Olives. French fries. Onion rings. Regular canned tomato sauce and paste. Regular tomato and vegetable juice. Frozen vegetables in sauces.  Grains  Instant hot cereals. Bread stuffing, pancake, and biscuit mixes. Croutons. Seasoned rice or pasta mixes. Noodle soup cups. Boxed or frozen macaroni and cheese. Regular salted crackers. Self-rising flour. Rolls. Bagels. Flour tortillas and wraps.  Meats and other proteins  Meat or fish that is salted, canned, smoked, cured, spiced, or pickled. This includes pérez, ham, sausages, hot dogs, corned beef, chipped beef, meat loaves, salt pork, jerky, pickled herring, anchovies, regular canned tuna, and sardines. Salted nuts.  Dairy  Processed cheese and cheese spreads. Cheese curds. Blue cheese. Feta cheese. String cheese. Regular cottage cheese. Buttermilk. Canned milk.  The items listed above may not be a complete list of foods high in sodium. Actual amounts of sodium may be different depending on processing. Contact a dietitian for more information.  What foods are low in sodium?  Fruits  Fresh, frozen, or canned fruit with no sauce added. Fruit juice.  Vegetables  Fresh or frozen vegetables with no sauce added. \"No salt added\" canned vegetables. \"No salt added\" tomato sauce and paste. Low-sodium or reduced-sodium tomato and vegetable juice.  Grains  Noodles, pasta, quinoa, rice. Shredded or puffed wheat or puffed rice. Regular or quick oats (not instant). Low-sodium crackers. Low-sodium bread. Whole-grain bread and whole-grain pasta. Unsalted popcorn.  Meats and other proteins  Fresh or frozen whole meats, poultry (not injected with sodium), and fish with no sauce added. Unsalted nuts. Dried peas, beans, and lentils without added salt. Unsalted canned beans. Eggs. Unsalted nut butters. " Low-sodium canned tuna or chicken.  Dairy  Milk. Soy milk. Yogurt. Low-sodium cheeses, such as Swiss, Comfort Mehrdad, mozzarella, and ricotta. Sherbet or ice cream (keep to ½ cup per serving). Cream cheese.  Fats and oils  Unsalted butter or margarine.  Other foods  Homemade pudding. Sodium-free baking soda and baking powder. Herbs and spices. Low-sodium seasoning mixes.  Beverages  Coffee and tea. Carbonated beverages.  The items listed above may not be a complete list of foods low in sodium. Actual amounts of sodium may be different depending on processing. Contact a dietitian for more information.  What are some salt alternatives when cooking?  The following are herbs, seasonings, and spices that can be used instead of salt to flavor your food. Herbs should be fresh or dried. Do not choose packaged mixes. Next to the name of the herb, spice, or seasoning are some examples of foods you can pair it with.  Herbs  Bay leaves - Soups, meat and vegetable dishes, and spaghetti sauce.  Basil - Italian dishes, soups, pasta, and fish dishes.  Cilantro - Meat, poultry, and vegetable dishes.  Chili powder - Marinades and Mexican dishes.  Chives - Salad dressings and potato dishes.  Cumin - Mexican dishes, couscous, and meat dishes.  Dill - Fish dishes, sauces, and salads.  Fennel - Meat and vegetable dishes, breads, and cookies.  Garlic (do not use garlic salt) - Italian dishes, meat dishes, salad dressings, and sauces.  Marjoram - Soups, potato dishes, and meat dishes.  Oregano - Pizza and spaghetti sauce.  Parsley - Salads, soups, pasta, and meat dishes.  Lynda - Italian dishes, salad dressings, soups, and red meats.  Saffron - Fish dishes, pasta, and some poultry dishes.  Espinoza - Stuffings and sauces.  Tarragon - Fish and poultry dishes.  Thyme - Stuffing, meat, and fish dishes.  Seasonings  Lemon juice - Fish dishes, poultry dishes, vegetables, and salads.  Vinegar - Salad dressings, vegetables, and fish  "dishes.  Spices  Cinnamon - Sweet dishes, such as cakes, cookies, and puddings.  Cloves - Gingerbread, puddings, and marinades for meats.  Stewart - Vegetable dishes, fish and poultry dishes, and stir-patton dishes.  Mira - Vegetable dishes, fish dishes, and stir-patton dishes.  Nutmeg - Pasta, vegetables, poultry, fish dishes, and custard.  Summary  Cooking with less salt is one way to reduce the amount of sodium that you get from food.  Buy sodium-free or low-sodium products.  Check the food label before using or buying packaged ingredients.  Use herbs, seasonings without salt, and spices as substitutes for salt in foods.  This information is not intended to replace advice given to you by your health care provider. Make sure you discuss any questions you have with your health care provider.  Document Revised: 12/09/2020 Document Reviewed: 12/09/2020  [employer] Patient Education © 2021 [employer]      https://www.nhlbi.nih.gov/files/docs/public/heart/dash_brief.pdf\">   DASH Eating Plan  DASH stands for Dietary Approaches to Stop Hypertension. The DASH eating plan is a healthy eating plan that has been shown to:  Reduce high blood pressure (hypertension).  Reduce your risk for type 2 diabetes, heart disease, and stroke.  Help with weight loss.  What are tips for following this plan?  Reading food labels  Check food labels for the amount of salt (sodium) per serving. Choose foods with less than 5 percent of the Daily Value of sodium. Generally, foods with less than 300 milligrams (mg) of sodium per serving fit into this eating plan.  To find whole grains, look for the word \"whole\" as the first word in the ingredient list.  Shopping  Buy products labeled as \"low-sodium\" or \"no salt added.\"  Buy fresh foods. Avoid canned foods and pre-made or frozen meals.  Cooking  Avoid adding salt when cooking. Use salt-free seasonings or herbs instead of table salt or sea salt. Check with your health care provider or pharmacist before " using salt substitutes.  Do not patton foods. Cook foods using healthy methods such as baking, boiling, grilling, roasting, and broiling instead.  Cook with heart-healthy oils, such as olive, canola, avocado, soybean, or sunflower oil.  Meal planning       Eat a balanced diet that includes:  4 or more servings of fruits and 4 or more servings of vegetables each day. Try to fill one-half of your plate with fruits and vegetables.  6-8 servings of whole grains each day.  Less than 6 oz (170 g) of lean meat, poultry, or fish each day. A 3-oz (85-g) serving of meat is about the same size as a deck of cards. One egg equals 1 oz (28 g).  2-3 servings of low-fat dairy each day. One serving is 1 cup (237 mL).  1 serving of nuts, seeds, or beans 5 times each week.  2-3 servings of heart-healthy fats. Healthy fats called omega-3 fatty acids are found in foods such as walnuts, flaxseeds, fortified milks, and eggs. These fats are also found in cold-water fish, such as sardines, salmon, and mackerel.  Limit how much you eat of:  Canned or prepackaged foods.  Food that is high in trans fat, such as some fried foods.  Food that is high in saturated fat, such as fatty meat.  Desserts and other sweets, sugary drinks, and other foods with added sugar.  Full-fat dairy products.  Do not salt foods before eating.  Do not eat more than 4 egg yolks a week.  Try to eat at least 2 vegetarian meals a week.  Eat more home-cooked food and less restaurant, buffet, and fast food.     Lifestyle  When eating at a restaurant, ask that your food be prepared with less salt or no salt, if possible.  If you drink alcohol:  Limit how much you use to:  0-1 drink a day for women who are not pregnant.  0-2 drinks a day for men.  Be aware of how much alcohol is in your drink. In the [place] one drink equals one 12 oz bottle of beer (355 mL), one 5 oz glass of wine (148 mL), or one 1½ oz glass of hard liquor (44 mL).  General information  Avoid eating more than  2,300 mg of salt a day. If you have hypertension, you may need to reduce your sodium intake to 1,500 mg a day.  Work with your health care provider to maintain a healthy body weight or to lose weight. Ask what an ideal weight is for you.  Get at least 30 minutes of exercise that causes your heart to beat faster (aerobic exercise) most days of the week. Activities may include walking, swimming, or biking.  Work with your health care provider or dietitian to adjust your eating plan to your individual calorie needs.  What foods should I eat?  Fruits  All fresh, dried, or frozen fruit. Canned fruit in natural juice (without added sugar).  Vegetables  Fresh or frozen vegetables (raw, steamed, roasted, or grilled). Low-sodium or reduced-sodium tomato and vegetable juice. Low-sodium or reduced-sodium tomato sauce and tomato paste. Low-sodium or reduced-sodium canned vegetables.  Grains  Whole-grain or whole-wheat bread. Whole-grain or whole-wheat pasta. Brown rice. Oatmeal. Quinoa. Bulgur. Whole-grain and low-sodium cereals. Maria D bread. Low-fat, low-sodium crackers. Whole-wheat flour tortillas.  Meats and other proteins  Skinless chicken or turkey. Ground chicken or turkey. Pork with fat trimmed off. Fish and seafood. Egg whites. Dried beans, peas, or lentils. Unsalted nuts, nut butters, and seeds. Unsalted canned beans. Lean cuts of beef with fat trimmed off. Low-sodium, lean precooked or cured meat, such as sausages or meat loaves.  Dairy  Low-fat (1%) or fat-free (skim) milk. Reduced-fat, low-fat, or fat-free cheeses. Nonfat, low-sodium ricotta or cottage cheese. Low-fat or nonfat yogurt. Low-fat, low-sodium cheese.  Fats and oils  Soft margarine without trans fats. Vegetable oil. Reduced-fat, low-fat, or light mayonnaise and salad dressings (reduced-sodium). Canola, safflower, olive, avocado, soybean, and sunflower oils. Avocado.  Seasonings and condiments  Herbs. Spices. Seasoning mixes without salt.  Other  foods  Unsalted popcorn and pretzels. Fat-free sweets.  The items listed above may not be a complete list of foods and beverages you can eat. Contact a dietitian for more information.  What foods should I avoid?  Fruits  Canned fruit in a light or heavy syrup. Fried fruit. Fruit in cream or butter sauce.  Vegetables  Creamed or fried vegetables. Vegetables in a cheese sauce. Regular canned vegetables (not low-sodium or reduced-sodium). Regular canned tomato sauce and paste (not low-sodium or reduced-sodium). Regular tomato and vegetable juice (not low-sodium or reduced-sodium). Pickles. Olives.  Grains  Baked goods made with fat, such as croissants, muffins, or some breads. Dry pasta or rice meal packs.  Meats and other proteins  Fatty cuts of meat. Ribs. Fried meat. [place]augh. Bologna, salami, and other precooked or cured meats, such as sausages or meat loaves. Fat from the back of a pig (fatback). Bratwurst. Salted nuts and seeds. Canned beans with added salt. Canned or smoked fish. Whole eggs or egg yolks. Chicken or turkey with skin.  Dairy  Whole or 2% milk, cream, and half-and-half. Whole or full-fat cream cheese. Whole-fat or sweetened yogurt. Full-fat cheese. Nondairy creamers. Whipped toppings. Processed cheese and cheese spreads.  Fats and oils  Butter. Stick margarine. Lard. Shortening. Ghee. [place]augh fat. Tropical oils, such as coconut, palm kernel, or palm oil.  Seasonings and condiments  Onion salt, garlic salt, seasoned salt, table salt, and sea salt. Worcestershire sauce. Tartar sauce. Barbecue sauce. Teriyaki sauce. Soy sauce, including reduced-sodium. Steak sauce. Canned and packaged gravies. Fish sauce. Oyster sauce. Cocktail sauce. Store-bought horseradish. Ketchup. Mustard. Meat flavorings and tenderizers. Bouillon cubes. Hot sauces. Pre-made or packaged marinades. Pre-made or packaged taco seasonings. Relishes. Regular salad dressings.  Other foods  Salted popcorn and pretzels.  The items listed above  may not be a complete list of foods and beverages you should avoid. Contact a dietitian for more information.  Where to find more information  National Heart, Lung, and Blood [place]: www.nhlbi.nih.gov  American Heart Association: www.heart.org  Academy of Nutrition and Dietetics: www.eatright.org  National Kidney Foundation: www.kidney.org  Summary  The DASH eating plan is a healthy eating plan that has been shown to reduce high blood pressure (hypertension). It may also reduce your risk for type 2 diabetes, heart disease, and stroke.  When on the DASH eating plan, aim to eat more fresh fruits and vegetables, whole grains, lean proteins, low-fat dairy, and heart-healthy fats.  With the DASH eating plan, you should limit salt (sodium) intake to 2,300 mg a day. If you have hypertension, you may need to reduce your sodium intake to 1,500 mg a day.  Work with your health care provider or dietitian to adjust your eating plan to your individual calorie needs.  This information is not intended to replace advice given to you by your health care provider. Make sure you discuss any questions you have with your health care provider.  Document Revised: 11/20/2020 Document Reviewed: 11/20/2020  [employer] Patient Education © 2021 [employer].       Heart-Healthy Eating Plan  Heart-healthy meal planning includes:  Eating less unhealthy fats.  Eating more healthy fats.  Making other changes in your diet.  Talk with your doctor or a diet specialist (dietitian) to create an eating plan that is right for you.  What is my plan?  Your doctor may recommend an eating plan that includes:  Total fat: ______% or less of total calories a day.  Saturated fat: ______% or less of total calories a day.  Cholesterol: less than _________mg a day.  What are tips for following this plan?  Cooking  Avoid frying your food. Try to bake, boil, grill, or broil it instead. You can also reduce fat by:  Removing the skin from poultry.  Removing all  visible fats from meats.  Steaming vegetables in water or broth.  Meal planning       At meals, divide your plate into four equal parts:  Fill one-half of your plate with vegetables and green salads.  Fill one-fourth of your plate with whole grains.  Fill one-fourth of your plate with lean protein foods.  Eat 4-5 servings of vegetables per day. A serving of vegetables is:  1 cup of raw or cooked vegetables.  2 cups of raw leafy greens.  Eat 4-5 servings of fruit per day. A serving of fruit is:  1 medium whole fruit.  ¼ cup of dried fruit.  ½ cup of fresh, frozen, or canned fruit.  ½ cup of 100% fruit juice.  Eat more foods that have soluble fiber. These are apples, broccoli, carrots, beans, peas, and barley. Try to get 20-30 g of fiber per day.  Eat 4-5 servings of nuts, legumes, and seeds per week:  1 serving of dried beans or legumes equals ½ cup after being cooked.  1 serving of nuts is ¼ cup.  1 serving of seeds equals 1 tablespoon.     General information  Eat more home-cooked food. Eat less restaurant, buffet, and fast food.  Limit or avoid alcohol.  Limit foods that are high in starch and sugar.  Avoid fried foods.  Lose weight if you are overweight.  Keep track of how much salt (sodium) you eat. This is important if you have high blood pressure. Ask your doctor to tell you more about this.  Try to add vegetarian meals each week.  Fats  Choose healthy fats. These include olive oil and canola oil, flaxseeds, walnuts, almonds, and seeds.  Eat more omega-3 fats. These include salmon, mackerel, sardines, tuna, flaxseed oil, and ground flaxseeds. Try to eat fish at least 2 times each week.  Check food labels. Avoid foods with trans fats or high amounts of saturated fat.  Limit saturated fats.  These are often found in animal products, such as meats, butter, and cream.  These are also found in plant foods, such as palm oil, palm kernel oil, and coconut oil.  Avoid foods with partially hydrogenated oils in them.  These have trans fats. Examples are stick margarine, some tub margarines, cookies, crackers, and other baked goods.  What foods can I eat?  Fruits  All fresh, canned (in natural juice), or frozen fruits.  Vegetables  Fresh or frozen vegetables (raw, steamed, roasted, or grilled). Green salads.  Grains  Most grains. Choose whole wheat and whole grains most of the time. Rice and pasta, including brown rice and pastas made with whole wheat.  Meats and other proteins  Lean, well-trimmed beef, veal, pork, and lamb. Chicken and turkey without skin. All fish and shellfish. Wild duck, rabbit, pheasant, and venison. Egg whites or low-cholesterol egg substitutes. Dried beans, peas, lentils, and tofu. Seeds and most nuts.  Dairy  Low-fat or nonfat cheeses, including ricotta and mozzarella. Skim or 1% milk that is liquid, powdered, or evaporated. Buttermilk that is made with low-fat milk. Nonfat or low-fat yogurt.  Fats and oils  Non-hydrogenated (trans-free) margarines. Vegetable oils, including soybean, sesame, sunflower, olive, peanut, safflower, corn, canola, and cottonseed. Salad dressings or mayonnaise made with a vegetable oil.  Beverages  Mineral water. Coffee and tea. Diet carbonated beverages.  Sweets and desserts  Sherbet, gelatin, and fruit ice. Small amounts of dark chocolate.  Limit all sweets and desserts.  Seasonings and condiments  All seasonings and condiments.  The items listed above may not be a complete list of foods and drinks you can eat. Contact a dietitian for more options.  What foods should I avoid?  Fruits  Canned fruit in heavy syrup. Fruit in cream or butter sauce. Fried fruit. Limit coconut.  Vegetables  Vegetables cooked in cheese, cream, or butter sauce. Fried vegetables.  Grains  Breads that are made with saturated or trans fats, oils, or whole milk. Croissants. Sweet rolls. Donuts. High-fat crackers, such as cheese crackers.  Meats and other proteins  Fatty meats, such as hot dogs, ribs,  sausage, pérez, rib-eye roast or steak. High-fat deli meats, such as salami and bologna. Caviar. Domestic duck and goose. Organ meats, such as liver.  Dairy  Cream, sour cream, cream cheese, and creamed cottage cheese. Whole-milk cheeses. Whole or 2% milk that is liquid, evaporated, or condensed. Whole buttermilk. Cream sauce or high-fat cheese sauce. Yogurt that is made from whole milk.  Fats and oils  Meat fat, or shortening. Cocoa butter, hydrogenated oils, palm oil, coconut oil, palm kernel oil. Solid fats and shortenings, including pérez fat, salt pork, lard, and butter. Nondairy cream substitutes. Salad dressings with cheese or sour cream.  Beverages  Regular sodas and juice drinks with added sugar.  Sweets and desserts  Frosting. Pudding. Cookies. Cakes. Pies. Milk chocolate or white chocolate. Buttered syrups. Full-fat ice cream or ice cream drinks.  The items listed above may not be a complete list of foods and drinks to avoid. Contact a dietitian for more information.  Summary  Heart-healthy meal planning includes eating less unhealthy fats, eating more healthy fats, and making other changes in your diet.  Eat a balanced diet. This includes fruits and vegetables, low-fat or nonfat dairy, lean protein, nuts and legumes, whole grains, and heart-healthy oils and fats.  This information is not intended to replace advice given to you by your health care provider. Make sure you discuss any questions you have with your health care provider.  Document Revised: 02/21/2019 Document Reviewed: 01/25/2019  [employer] Patient Education © 2021 [employer].       Mediterranean Diet  A Mediterranean diet refers to food and lifestyle choices that are based on the traditions of countries located on the [place]. This way of eating has been shown to help prevent certain conditions and improve outcomes for people who have chronic diseases, like kidney disease and heart disease.  What are tips for following this  plan?  Lifestyle  Cook and eat meals together with your family, when possible.  Drink enough fluid to keep your urine clear or pale yellow.  Be physically active every day. This includes:  Aerobic exercise like running or swimming.  Leisure activities like gardening, walking, or housework.  Get 7-8 hours of sleep each night.  If recommended by your health care provider, drink red wine in moderation. This means 1 glass a day for nonpregnant women and 2 glasses a day for men. A glass of wine equals 5 oz (150 mL).  Reading food labels       Check the serving size of packaged foods. For foods such as rice and pasta, the serving size refers to the amount of cooked product, not dry.  Check the total fat in packaged foods. Avoid foods that have saturated fat or trans fats.  Check the ingredients list for added sugars, such as corn syrup.     Shopping  At the grocery store, buy most of your food from the areas near the walls of the store. This includes:  Fresh fruits and vegetables (produce).  Grains, beans, nuts, and seeds. Some of these may be available in unpackaged forms or large amounts (in bulk).  Fresh seafood.  Poultry and eggs.  Low-fat dairy products.  Buy whole ingredients instead of prepackaged foods.  Buy fresh fruits and vegetables in-season from local farmers markets.  Buy frozen fruits and vegetables in resealable bags.  If you do not have access to quality fresh seafood, buy precooked frozen shrimp or canned fish, such as tuna, salmon, or sardines.  Buy small amounts of raw or cooked vegetables, salads, or olives from the deli or salad bar at your store.  Stock your pantry so you always have certain foods on hand, such as olive oil, canned tuna, canned tomatoes, rice, pasta, and beans.  Cooking  Cook foods with extra-virgin olive oil instead of using butter or other vegetable oils.  Have meat as a side dish, and have vegetables or grains as your main dish. This means having meat in small portions or adding  small amounts of meat to foods like pasta or stew.  Use beans or vegetables instead of meat in common dishes like chili or lasagna.  [place] with different cooking methods. Try roasting or broiling vegetables instead of steaming or sautéeing them.  Add frozen vegetables to soups, stews, pasta, or rice.  Add nuts or seeds for added healthy fat at each meal. You can add these to yogurt, salads, or vegetable dishes.  Marinate fish or vegetables using olive oil, lemon juice, garlic, and fresh herbs.  Meal planning       Plan to eat 1 vegetarian meal one day each week. Try to work up to 2 vegetarian meals, if possible.  Eat seafood 2 or more times a week.  Have healthy snacks readily available, such as:  Vegetable sticks with hummus.  Greek yogurt.  Fruit and nut trail mix.  Eat balanced meals throughout the week. This includes:  Fruit: 2-3 servings a day  Vegetables: 4-5 servings a day  Low-fat dairy: 2 servings a day  Fish, poultry, or lean meat: 1 serving a day  Beans and legumes: 2 or more servings a week  Nuts and seeds: 1-2 servings a day  Whole grains: 6-8 servings a day  Extra-virgin olive oil: 3-4 servings a day  Limit red meat and sweets to only a few servings a month     What are my food choices?  Mediterranean diet  Recommended  Grains: Whole-grain pasta. Brown rice. Bulgar wheat. Polenta. Couscous. Whole-wheat bread. Oatmeal. Quinoa.  Vegetables: Artichokes. Beets. Broccoli. Cabbage. Carrots. Eggplant. Green beans. Chard. Kale. Spinach. Onions. Leeks. Peas. Squash. Tomatoes. Peppers. Radishes.  Fruits: Apples. Apricots. Avocado. Berries. Bananas. Cherries. Dates. Figs. Grapes. [place]. Melon. Oranges. Peaches. Plums. Pomegranate.  Meats and other protein foods: Beans. Almonds. Sunflower seeds. Pine nuts. Peanuts. Cod. [place]. Scallops. Shrimp. Tuna. Tilapia. Clams. Oysters. Eggs.  Dairy: Low-fat milk. Cheese. Greek yogurt.  Beverages: Water. Red wine. Herbal tea.  Fats and oils: Extra virgin olive oil.  Avocado oil. Grape seed oil.  Sweets and desserts: Greek yogurt with honey. Baked apples. Poached pears. Trail mix.  Seasoning and other foods: Basil. Cilantro. Coriander. Cumin. Mint. Parsley. [place]ino[place]. Rosemary. Tarragon. Garlic. Oregano. Thyme. Pepper. Balsalmic vinegar. Tahini. Hummus. Tomato sauce. Olives. Mushrooms.  Limit these  Grains: Prepackaged pasta or rice dishes. Prepackaged cereal with added sugar.  Vegetables: Deep fried potatoes (french fries).  Fruits: Fruit canned in syrup.  Meats and other protein foods: Beef. Pork. Lamb. Poultry with skin. Hot dogs. Baugh.  Dairy: Ice cream. Sour cream. Whole milk.  Beverages: Juice. Sugar-sweetened soft drinks. Beer. Liquor and spirits.  Fats and oils: Butter. Canola oil. Vegetable oil. Beef fat (tallow). Lard.  Sweets and desserts: Cookies. Cakes. Pies. Candy.  Seasoning and other foods: Mayonnaise. Premade sauces and marinades.  The items listed may not be a complete list. Talk with your dietitian about what dietary choices are right for you.  Summary  The Mediterranean diet includes both food and lifestyle choices.  Eat a variety of fresh fruits and vegetables, beans, nuts, seeds, and whole grains.  Limit the amount of red meat and sweets that you eat.  Talk with your health care provider about whether it is safe for you to drink red wine in moderation. This means 1 glass a day for nonpregnant women and 2 glasses a day for men. A glass of wine equals 5 oz (150 mL).  This information is not intended to replace advice given to you by your health care provider. Make sure you discuss any questions you have with your health care provider.  Document Revised: 08/17/2017 Document Reviewed: 08/10/2017  Elsevier Patient Education © 2020 [employer]         Exercising to Stay Healthy  To become healthy and stay healthy, it is recommended that you do moderate-intensity and vigorous-intensity exercise. You can tell that you are exercising at a moderate intensity if your  heart starts beating faster and you start breathing faster but can still hold a conversation. You can tell that you are exercising at a vigorous intensity if you are breathing much harder and faster and cannot hold a conversation while exercising.  Exercising regularly is important. It has many health benefits, such as:  Improving overall fitness, flexibility, and endurance.  Increasing bone density.  Helping with weight control.  Decreasing body fat.  Increasing muscle strength.  Reducing stress and tension.  Improving overall health.  How often should I exercise?  Choose an activity that you enjoy, and set realistic goals. Your health care provider can help you make an activity plan that works for you.  Exercise regularly as told by your health care provider. This may include:  Doing strength training two times a week, such as:  Lifting weights.  Using resistance bands.  Push-ups.  Sit-ups.  Yoga.  Doing a certain intensity of exercise for a given amount of time. Choose from these options:  A total of 150 minutes of moderate-intensity exercise every week.  A total of 75 minutes of vigorous-intensity exercise every week.  A mix of moderate-intensity and vigorous-intensity exercise every week.  Children, pregnant women, people who have not exercised regularly, people who are overweight, and older adults may need to talk with a health care provider about what activities are safe to do. If you have a medical condition, be sure to talk with your health care provider before you start a new exercise program.  What are some exercise ideas?    Moderate-intensity exercise ideas include:  Walking 1 mile (1.6 km) in about 15 minutes.  Biking.  Hiking.  Golfing.  Dancing.  Water aerobics.  Vigorous-intensity exercise ideas include:  Walking 4.5 miles (7.2 km) or more in about 1 hour.  Jogging or running 5 miles (8 km) in about 1 hour.  Biking 10 miles (16.1 km) or more in about 1 hour.  Lap swimming.  Roller-skating or in-line  skating.  Cross-country skiing.  Vigorous competitive sports, such as football, basketball, and soccer.  Jumping rope.  Aerobic dancing.  What are some everyday activities that can help me to get exercise?  Yard work, such as:  Pushing a .  Raking and bagging leaves.  Washing your car.  Pushing a stroller.  Shoveling snow.  Gardening.  Washing windows or floors.  How can I be more active in my day-to-day activities?  Use stairs instead of an elevator.  Take a walk during your lunch break.  If you drive, park your car farther away from your work or school.  If you take public transportation, get off one stop early and walk the rest of the way.  Stand up or walk around during all of your indoor phone calls.  Get up, stretch, and walk around every 30 minutes throughout the day.  Enjoy exercise with a friend. Support to continue exercising will help you keep a regular routine of activity.  What guidelines can I follow while exercising?  Before you start a new exercise program, talk with your health care provider.  Do not exercise so much that you hurt yourself, feel dizzy, or get very short of breath.  Wear comfortable clothes and wear shoes with good support.  Drink plenty of water while you exercise to prevent dehydration or heat stroke.  Work out until your breathing and your heartbeat get faster.  Where to find more information  [place] Department of Health and Human Services: www.hhs.gov  Centers for Disease Control and Prevention (CDC): www.cdc.gov  Summary  Exercising regularly is important. It will improve your overall fitness, flexibility, and endurance.  Regular exercise also will improve your overall health. It can help you control your weight, reduce stress, and improve your bone density.  Do not exercise so much that you hurt yourself, feel dizzy, or get very short of breath.  Before you start a new exercise program, talk with your health care provider.  This information is not intended to replace  advice given to you by your health care provider. Make sure you discuss any questions you have with your health care provider.  Document Revised: 11/30/2018 Document Reviewed: 11/08/2018  Elsevier Patient Education © 2021 [employer]           Mindfulness-Based Stress Reduction  Mindfulness-based stress reduction (MBSR) is a program that helps people learn to practice mindfulness. Mindfulness is the practice of intentionally paying attention to the present moment. It can be learned and practiced through techniques such as education, breathing exercises, meditation, and yoga. MBSR includes several mindfulness techniques in one program.  MBSR works best when you understand the treatment, are willing to try new things, and can commit to spending time practicing what you learn. MBSR training may include learning about:  How your emotions, thoughts, and reactions affect your body.  New ways to respond to things that cause negative thoughts to start (triggers).  How to notice your thoughts and let go of them.  Practicing awareness of everyday things that you normally do without thinking.  The techniques and goals of different types of meditation.  What are the benefits of MBSR?  MBSR can have many benefits, which include helping you to:  Develop self-awareness. This refers to knowing and understanding yourself.  Learn skills and attitudes that help you to participate in your own health care.  Learn new ways to care for yourself.  Be more accepting about how things are, and let things go.  Be less judgmental and approach things with an open mind.  Be patient with yourself and trust yourself more.  MBSR has also been shown to:  Reduce negative emotions, such as depression and anxiety.  Improve memory and focus.  Change how you sense and approach pain.  Boost your body's ability to fight infections.  Help you connect better with other people.  Improve your sense of well-being.  Follow these instructions at home:       Find  a local in-person or online MBSR program.  Set aside some time regularly for mindfulness practice.  Find a mindfulness practice that works best for you. This may include one or more of the following:  Meditation. Meditation involves focusing your mind on a certain thought or activity.  Breathing awareness exercises. These help you to stay present by focusing on your breath.  Body scan. For this practice, you lie down and pay attention to each part of your body from head to toe. You can identify tension and soreness and intentionally relax parts of your body.  Yoga. Yoga involves stretching and breathing, and it can improve your ability to move and be flexible. It can also provide an experience of testing your body's limits, which can help you release stress.  Mindful eating. This way of eating involves focusing on the taste, texture, color, and smell of each bite of food. Because this slows down eating and helps you feel full sooner, it can be an important part of a weight-loss plan.  Find a podcast or recording that provides guidance for breathing awareness, body scan, or meditation exercises. You can listen to these any time when you have a free moment to rest without distractions.  Follow your treatment plan as told by your health care provider. This may include taking regular medicines and making changes to your diet or lifestyle as recommended.  How to practice mindfulness  To do a basic awareness exercise:  Find a comfortable place to sit.  Pay attention to the present moment. Observe your thoughts, feelings, and surroundings just as they are.  Avoid placing judgment on yourself, your feelings, or your surroundings. Make note of any judgment that comes up, and let it go.  Your mind may wander, and that is okay. Make note of when your thoughts drift, and return your attention to the present moment.  To do basic mindfulness meditation:  Find a comfortable place to sit. This may include a stable chair or a firm  floor cushion.  Sit upright with your back straight. Let your arms fall next to your side with your hands resting on your legs.  If sitting in a chair, rest your feet flat on the floor.  If sitting on a cushion, cross your legs in front of you.  Keep your head in a neutral position with your chin dropped slightly. Relax your jaw and rest the tip of your tongue on the roof of your mouth. Drop your gaze to the floor. You can close your eyes if you like.  Breathe normally and pay attention to your breath. Feel the air moving in and out of your nose. Feel your belly expanding and relaxing with each breath.  Your mind may wander, and that is okay. Make note of when your thoughts drift, and return your attention to your breath.  Avoid placing judgment on yourself, your feelings, or your surroundings. Make note of any judgment or feelings that come up, let them go, and bring your attention back to your breath.  When you are ready, lift your gaze or open your eyes. Pay attention to how your body feels after the meditation.  Where to find more information  You can find more information about MBSR from:  Your health care provider.  Community-based meditation centers or programs.  Programs offered near you.  Summary  Mindfulness-based stress reduction (MBSR) is a program that teaches you how to intentionally pay attention to the present moment. It is used with other treatments to help you cope better with daily stress, emotions, and pain.  MBSR focuses on developing self-awareness, which allows you to respond to life stress without judgment or negative emotions.  MBSR programs may involve learning different mindfulness practices, such as breathing exercises, meditation, yoga, body scan, or mindful eating. Find a mindfulness practice that works best for you, and set aside time for it on a regular basis.  This information is not intended to replace advice given to you by your health care provider. Make sure you discuss any  questions you have with your health care provider.  Document Revised: 02/22/2021 Document Reviewed: 04/26/2018  Elsevier Patient Education © 2021 Elsevier Inc.

## 2023-05-08 ENCOUNTER — OFFICE VISIT (OUTPATIENT)
Dept: INTERNAL MEDICINE | Facility: CLINIC | Age: 58
End: 2023-05-08
Payer: COMMERCIAL

## 2023-05-08 VITALS
OXYGEN SATURATION: 98 % | WEIGHT: 223.3 LBS | TEMPERATURE: 97.8 F | DIASTOLIC BLOOD PRESSURE: 92 MMHG | HEIGHT: 70 IN | HEART RATE: 60 BPM | BODY MASS INDEX: 31.97 KG/M2 | SYSTOLIC BLOOD PRESSURE: 151 MMHG

## 2023-05-08 DIAGNOSIS — N40.0 BENIGN PROSTATIC HYPERPLASIA WITHOUT LOWER URINARY TRACT SYMPTOMS: ICD-10-CM

## 2023-05-08 DIAGNOSIS — E78.00 PURE HYPERCHOLESTEROLEMIA: ICD-10-CM

## 2023-05-08 DIAGNOSIS — R07.89 CHEST TIGHTNESS: ICD-10-CM

## 2023-05-08 DIAGNOSIS — J30.89 ALLERGIC RHINITIS DUE TO OTHER ALLERGIC TRIGGER, UNSPECIFIED SEASONALITY: ICD-10-CM

## 2023-05-08 DIAGNOSIS — E87.1 HYPONATREMIA: ICD-10-CM

## 2023-05-08 DIAGNOSIS — Z00.00 ANNUAL PHYSICAL EXAM: Primary | ICD-10-CM

## 2023-05-08 DIAGNOSIS — I10 ESSENTIAL HYPERTENSION: ICD-10-CM

## 2023-05-08 PROBLEM — J30.9 ALLERGIC RHINITIS: Status: ACTIVE | Noted: 2023-05-08

## 2023-05-08 LAB
BASOPHILS # BLD AUTO: 0.04 10*3/MM3 (ref 0–0.2)
BASOPHILS NFR BLD AUTO: 0.6 % (ref 0–1.5)
CHOLEST SERPL-MCNC: 167 MG/DL (ref 0–200)
DEPRECATED RDW RBC AUTO: 41 FL (ref 37–54)
EOSINOPHIL # BLD AUTO: 0.2 10*3/MM3 (ref 0–0.4)
EOSINOPHIL NFR BLD AUTO: 2.9 % (ref 0.3–6.2)
ERYTHROCYTE [DISTWIDTH] IN BLOOD BY AUTOMATED COUNT: 11.8 % (ref 12.3–15.4)
HCT VFR BLD AUTO: 44.2 % (ref 37.5–51)
HDLC SERPL-MCNC: 47 MG/DL (ref 40–60)
HGB BLD-MCNC: 15.1 G/DL (ref 13–17.7)
IMM GRANULOCYTES # BLD AUTO: 0.02 10*3/MM3 (ref 0–0.05)
IMM GRANULOCYTES NFR BLD AUTO: 0.3 % (ref 0–0.5)
LDLC SERPL CALC-MCNC: 99 MG/DL (ref 0–100)
LDLC/HDLC SERPL: 2.07 {RATIO}
LYMPHOCYTES # BLD AUTO: 2.49 10*3/MM3 (ref 0.7–3.1)
LYMPHOCYTES NFR BLD AUTO: 35.7 % (ref 19.6–45.3)
MCH RBC QN AUTO: 31.8 PG (ref 26.6–33)
MCHC RBC AUTO-ENTMCNC: 34.2 G/DL (ref 31.5–35.7)
MCV RBC AUTO: 93.1 FL (ref 79–97)
MONOCYTES # BLD AUTO: 0.73 10*3/MM3 (ref 0.1–0.9)
MONOCYTES NFR BLD AUTO: 10.5 % (ref 5–12)
NEUTROPHILS NFR BLD AUTO: 3.49 10*3/MM3 (ref 1.7–7)
NEUTROPHILS NFR BLD AUTO: 50 % (ref 42.7–76)
NRBC BLD AUTO-RTO: 0 /100 WBC (ref 0–0.2)
PLATELET # BLD AUTO: 280 10*3/MM3 (ref 140–450)
PMV BLD AUTO: 11.4 FL (ref 6–12)
RBC # BLD AUTO: 4.75 10*6/MM3 (ref 4.14–5.8)
TRIGL SERPL-MCNC: 114 MG/DL (ref 0–150)
TSH SERPL DL<=0.05 MIU/L-ACNC: 3.06 UIU/ML (ref 0.27–4.2)
VLDLC SERPL-MCNC: 21 MG/DL (ref 5–40)
WBC NRBC COR # BLD: 6.97 10*3/MM3 (ref 3.4–10.8)

## 2023-05-08 PROCEDURE — 80061 LIPID PANEL: CPT | Performed by: STUDENT IN AN ORGANIZED HEALTH CARE EDUCATION/TRAINING PROGRAM

## 2023-05-08 PROCEDURE — 80050 GENERAL HEALTH PANEL: CPT | Performed by: STUDENT IN AN ORGANIZED HEALTH CARE EDUCATION/TRAINING PROGRAM

## 2023-05-08 RX ORDER — FLUTICASONE PROPIONATE 50 MCG
2 SPRAY, SUSPENSION (ML) NASAL DAILY
Qty: 18.2 ML | Refills: 2 | Status: SHIPPED | OUTPATIENT
Start: 2023-05-08

## 2023-05-08 RX ORDER — CARVEDILOL 25 MG/1
25 TABLET ORAL 2 TIMES DAILY WITH MEALS
Qty: 180 TABLET | Refills: 2 | Status: SHIPPED | OUTPATIENT
Start: 2023-05-08 | End: 2023-06-07

## 2023-05-08 RX ORDER — TAMSULOSIN HYDROCHLORIDE 0.4 MG/1
1 CAPSULE ORAL DAILY
Qty: 90 CAPSULE | Refills: 3 | Status: SHIPPED | OUTPATIENT
Start: 2023-05-08

## 2023-05-08 RX ORDER — LISINOPRIL 20 MG/1
20 TABLET ORAL DAILY
Qty: 90 TABLET | Refills: 2 | Status: SHIPPED | OUTPATIENT
Start: 2023-05-08

## 2023-05-08 RX ORDER — ATORVASTATIN CALCIUM 40 MG/1
40 TABLET, FILM COATED ORAL DAILY
Qty: 90 TABLET | Refills: 3 | Status: SHIPPED | OUTPATIENT
Start: 2023-05-08

## 2023-05-08 RX ORDER — ASPIRIN 81 MG/1
81 TABLET ORAL DAILY
Qty: 90 TABLET | Refills: 2 | Status: SHIPPED | OUTPATIENT
Start: 2023-05-08

## 2023-05-08 RX ORDER — CEPHALEXIN 500 MG/1
CAPSULE ORAL
COMMUNITY
Start: 2023-05-01

## 2023-05-08 RX ORDER — ASPIRIN 81 MG/1
81 TABLET ORAL DAILY
COMMUNITY
End: 2023-05-08 | Stop reason: SDUPTHER

## 2023-05-08 NOTE — PROGRESS NOTES
"Chief Complaint  Annual Exam    Subjective          Jose Luis Casas presents to [facility]  History of Present Illness    Here with wife    Seen in ER 5/1/23 for hypertensive urgency plus chest tightness.  Had reassuring workup including one negative troponin and reassuring EKG.  Increased coreg and arranged cardiology follow up for outpatient workup.  Also placed on cephalexin and lasix for cellulitis and swelling of lower extremities.    No interim chest pain.  Legs are improved.  Will see cardiology next week.      Current Outpatient Medications   Medication Instructions   • aspirin 81 mg, Oral, Daily   • atorvastatin (LIPITOR) 40 mg, Oral, Daily   • carvedilol (COREG) 25 mg, Oral, 2 Times Daily With Meals   • cephalexin (KEFLEX) 500 MG capsule TAKE 1 CAPSULE BY MOUTH FOUR TIMES DAILY FOR 10 DAYS   • Cholecalciferol 125 mcg, Oral, Daily   • fluticasone (FLONASE) 50 MCG/ACT nasal spray 2 sprays, Nasal, Daily   • furosemide (LASIX) 20 mg, Oral, Daily   • lisinopril (PRINIVIL,ZESTRIL) 20 mg, Oral, Daily   • tamsulosin (FLOMAX) 0.4 mg, Oral, Daily   • Vibegron 75 mg, Oral, Daily       The following portions of the patient's history were reviewed and updated as appropriate: allergies, current medications, past family history, past medical history, past social history, past surgical history, and problem list.    Objective   Vital Signs:   /92 (BP Location: Right arm, Patient Position: Sitting)   Pulse 60   Temp 97.8 °F (36.6 °C) (Temporal)   Ht 177.8 cm (70\")   Wt 101 kg (223 lb 4.8 oz)   SpO2 98%   BMI 32.04 kg/m²     Wt Readings from Last 3 Encounters:   05/08/23 101 kg (223 lb 4.8 oz)   05/01/23 102 kg (224 lb 13.9 oz)   04/20/23 103 kg (227 lb)     BP Readings from Last 3 Encounters:   05/08/23 151/92   05/01/23 168/93   04/20/23 164/94     Physical Exam  Vitals reviewed.   Constitutional:       General: He is not in acute distress.     Appearance: Normal " appearance. He is not ill-appearing, toxic-appearing or diaphoretic.   HENT:      Head: Normocephalic and atraumatic.      Right Ear: External ear normal.      Left Ear: External ear normal.   Eyes:      Conjunctiva/sclera: Conjunctivae normal.   Cardiovascular:      Rate and Rhythm: Normal rate and regular rhythm.      Pulses: Normal pulses.      Heart sounds: Normal heart sounds. No murmur heard.    No friction rub. No gallop.   Pulmonary:      Effort: Pulmonary effort is normal. No respiratory distress.      Breath sounds: Normal breath sounds. No stridor. No wheezing, rhonchi or rales.   Chest:      Chest wall: No tenderness.   Abdominal:      General: Abdomen is flat.      Palpations: Abdomen is soft. There is no mass.      Tenderness: There is no abdominal tenderness.   Musculoskeletal:      Right lower leg: Edema present.      Left lower leg: Edema present.      Comments: Erythematous patches on bilateral shins.  1+ edema bilateral LE's.   Skin:     General: Skin is warm and dry.   Neurological:      General: No focal deficit present.      Mental Status: He is alert. Mental status is at baseline.   Psychiatric:         Mood and Affect: Mood normal.         Behavior: Behavior normal.         Thought Content: Thought content normal.         Judgment: Judgment normal.       Result Review :   The following data was reviewed by: Sid Arango MD on 05/08/2023:  Common labs        3/7/2023    10:44 3/27/2023    12:19 3/27/2023    12:23 5/1/2023    11:26   Common Labs   Glucose 89   111    96     BUN 14   11    11     Creatinine 0.90   0.80    0.86     Sodium 131   137    133     Potassium 4.7   4.7    4.4     Chloride 96   101    99     Calcium 10.2   9.9    9.9     Albumin  4.6    4.4     Total Bilirubin  0.4    0.6     Alkaline Phosphatase  61    62     AST (SGOT)  23    19     ALT (SGPT)  26    23     WBC  6.53    7.67     Hemoglobin  14.7    14.3     Hematocrit  43.2    43.1     Platelets  302    274      Microalbumin, Urine   1.5               EKG from 5/1/23 ER visit reviewed.  No EKG for comparison.  Notable findings include left axis deviation and non-pathological q wave in Lead I.      Lab Results   Component Value Date    COVID19 NOT DETECTED 12/05/2020    INR 0.97 05/01/2023    BILIRUBINUR Negative 03/27/2023       Procedures        Assessment and Plan    Diagnoses and all orders for this visit:    1. Annual physical exam (Primary)  -     CBC & Differential  -     Comprehensive Metabolic Panel  -     TSH  -     Lipid Panel    2. Essential hypertension  -     Comprehensive Metabolic Panel  -     lisinopril (PRINIVIL,ZESTRIL) 20 MG tablet; Take 1 tablet by mouth Daily.  Dispense: 90 tablet; Refill: 2  -     carvedilol (Coreg) 25 MG tablet; Take 1 tablet by mouth 2 (Two) Times a Day With Meals for 30 days.  Dispense: 180 tablet; Refill: 2    3. Hyponatremia  -     Comprehensive Metabolic Panel    4. Pure hypercholesterolemia  -     Lipid Panel  -     atorvastatin (LIPITOR) 40 MG tablet; Take 1 tablet by mouth Daily.  Dispense: 90 tablet; Refill: 3    5. Benign prostatic hyperplasia without lower urinary tract symptoms  -     tamsulosin (FLOMAX) 0.4 MG capsule 24 hr capsule; Take 1 capsule by mouth Daily.  Dispense: 90 capsule; Refill: 3  -     Vibegron 75 MG tablet; Take 1 tablet by mouth Daily.  Dispense: 90 tablet; Refill: 2    6. Chest tightness    7. Allergic rhinitis due to other allergic trigger, unspecified seasonality  -     fluticasone (FLONASE) 50 MCG/ACT nasal spray; 2 sprays into the nostril(s) as directed by provider Daily.  Dispense: 18.2 mL; Refill: 2    Other orders  -     aspirin 81 MG EC tablet; Take 1 tablet by mouth Daily.  Dispense: 90 tablet; Refill: 2  -     Cholecalciferol 125 MCG (5000 UT) tablet; Take 1 tablet by mouth Daily.  Dispense: 90 tablet; Refill: 2      HTN:  -above goal  -will start lisinopril    Chest pressure:  -no incidents since ER visit  -EKG reassuring, troponin  negative in ER  -seeing cardiology next week, with plans for echo    Health Maintenance:  -nutritional counseling on the components of a heart healthy diet  -exercise counseling, recommending at least 2.5 hours of moderate exercise weekly      Medications Discontinued During This Encounter   Medication Reason   • fluticasone (FLONASE) 50 MCG/ACT nasal spray Reorder   • atorvastatin (LIPITOR) 40 MG tablet Reorder   • Cholecalciferol 125 MCG (5000 UT) tablet Reorder   • tamsulosin (FLOMAX) 0.4 MG capsule 24 hr capsule Reorder   • Vibegron 75 MG tablet Reorder   • carvedilol (Coreg) 25 MG tablet Reorder   • aspirin 81 MG EC tablet Reorder          Follow Up   Return in about 3 months (around 8/8/2023) for HTN.  Patient was given instructions and counseling regarding his condition or for health maintenance advice. Please see specific information pulled into the AVS if appropriate.       Sid Arango MD  05/08/23  11:27 EDT

## 2023-05-09 LAB
ALBUMIN SERPL-MCNC: 5 G/DL (ref 3.5–5.2)
ALBUMIN/GLOB SERPL: 1.9 G/DL
ALP SERPL-CCNC: 60 U/L (ref 39–117)
ALT SERPL W P-5'-P-CCNC: 26 U/L (ref 1–41)
ANION GAP SERPL CALCULATED.3IONS-SCNC: 8 MMOL/L (ref 5–15)
AST SERPL-CCNC: 24 U/L (ref 1–40)
BILIRUB SERPL-MCNC: 0.5 MG/DL (ref 0–1.2)
BUN SERPL-MCNC: 10 MG/DL (ref 6–20)
BUN/CREAT SERPL: 12.3 (ref 7–25)
CALCIUM SPEC-SCNC: 9.8 MG/DL (ref 8.6–10.5)
CHLORIDE SERPL-SCNC: 97 MMOL/L (ref 98–107)
CO2 SERPL-SCNC: 29 MMOL/L (ref 22–29)
CREAT SERPL-MCNC: 0.81 MG/DL (ref 0.76–1.27)
EGFRCR SERPLBLD CKD-EPI 2021: 103.5 ML/MIN/1.73
GLOBULIN UR ELPH-MCNC: 2.6 GM/DL
GLUCOSE SERPL-MCNC: 98 MG/DL (ref 65–99)
POTASSIUM SERPL-SCNC: 4.9 MMOL/L (ref 3.5–5.2)
PROT SERPL-MCNC: 7.6 G/DL (ref 6–8.5)
SODIUM SERPL-SCNC: 134 MMOL/L (ref 136–145)

## 2023-05-12 ENCOUNTER — PRIOR AUTHORIZATION (OUTPATIENT)
Dept: INTERNAL MEDICINE | Facility: CLINIC | Age: 58
End: 2023-05-12
Payer: COMMERCIAL

## 2023-05-12 NOTE — TELEPHONE ENCOUNTER
PA REQUIRED FOR FOLLOWING MEDICATION:    Vibegron 75 MG tablet (05/08/2023)    INDEXED VIA [employer]

## 2023-05-15 ENCOUNTER — HOSPITAL ENCOUNTER (OUTPATIENT)
Dept: CARDIOLOGY | Facility: HOSPITAL | Age: 58
Discharge: HOME | End: 2023-05-15
Admitting: STUDENT IN AN ORGANIZED HEALTH CARE EDUCATION/TRAINING PROGRAM
Payer: COMMERCIAL

## 2023-05-15 VITALS
DIASTOLIC BLOOD PRESSURE: 96 MMHG | BODY MASS INDEX: 31.92 KG/M2 | HEART RATE: 74 BPM | SYSTOLIC BLOOD PRESSURE: 162 MMHG | WEIGHT: 223 LBS | OXYGEN SATURATION: 98 % | HEIGHT: 70 IN

## 2023-05-15 DIAGNOSIS — R60.0 LOWER EXTREMITY EDEMA: ICD-10-CM

## 2023-05-15 DIAGNOSIS — R06.09 DYSPNEA ON EXERTION: ICD-10-CM

## 2023-05-15 LAB
AORTIC ARCH: 3.4 CM
AORTIC DIMENSIONLESS INDEX: 0.7 (DI)
ASCENDING AORTA: 3.5 CM
BH CV ECHO MEAS - ACS: 1.84 CM
BH CV ECHO MEAS - AO MAX PG: 5.7 MMHG
BH CV ECHO MEAS - AO MEAN PG: 2.7 MMHG
BH CV ECHO MEAS - AO ROOT DIAM: 2.9 CM
BH CV ECHO MEAS - AO V2 MAX: 119.4 CM/SEC
BH CV ECHO MEAS - AO V2 VTI: 27 CM
BH CV ECHO MEAS - AVA(I,D): 2.36 CM2
BH CV ECHO MEAS - EDV(CUBED): 140.9 ML
BH CV ECHO MEAS - EDV(MOD-SP2): 118 ML
BH CV ECHO MEAS - EDV(MOD-SP4): 135 ML
BH CV ECHO MEAS - EF(MOD-BP): 53.2 %
BH CV ECHO MEAS - EF(MOD-SP2): 53.4 %
BH CV ECHO MEAS - EF(MOD-SP4): 54.1 %
BH CV ECHO MEAS - ESV(CUBED): 52.9 ML
BH CV ECHO MEAS - ESV(MOD-SP2): 55 ML
BH CV ECHO MEAS - ESV(MOD-SP4): 62 ML
BH CV ECHO MEAS - FS: 27.9 %
BH CV ECHO MEAS - IVS/LVPW: 1.28 CM
BH CV ECHO MEAS - IVSD: 1.28 CM
BH CV ECHO MEAS - LAT PEAK E' VEL: 8.7 CM/SEC
BH CV ECHO MEAS - LV DIASTOLIC VOL/BSA (35-75): 61.8 CM2
BH CV ECHO MEAS - LV MASS(C)D: 232.8 GRAMS
BH CV ECHO MEAS - LV MAX PG: 2.7 MMHG
BH CV ECHO MEAS - LV MEAN PG: 1.39 MMHG
BH CV ECHO MEAS - LV SYSTOLIC VOL/BSA (12-30): 28.4 CM2
BH CV ECHO MEAS - LV V1 MAX: 82.7 CM/SEC
BH CV ECHO MEAS - LV V1 VTI: 18.7 CM
BH CV ECHO MEAS - LVIDD: 5.2 CM
BH CV ECHO MEAS - LVIDS: 3.8 CM
BH CV ECHO MEAS - LVOT AREA: 3.4 CM2
BH CV ECHO MEAS - LVOT DIAM: 2.08 CM
BH CV ECHO MEAS - LVPWD: 1 CM
BH CV ECHO MEAS - MED PEAK E' VEL: 10.7 CM/SEC
BH CV ECHO MEAS - MV A DUR: 0.12 SEC
BH CV ECHO MEAS - MV A MAX VEL: 90 CM/SEC
BH CV ECHO MEAS - MV DEC SLOPE: 573.7 CM/SEC2
BH CV ECHO MEAS - MV DEC TIME: 187 MSEC
BH CV ECHO MEAS - MV E MAX VEL: 87.8 CM/SEC
BH CV ECHO MEAS - MV E/A: 0.98
BH CV ECHO MEAS - MV MAX PG: 3.6 MMHG
BH CV ECHO MEAS - MV MEAN PG: 1.34 MMHG
BH CV ECHO MEAS - MV P1/2T: 48.5 MSEC
BH CV ECHO MEAS - MV V2 VTI: 23.1 CM
BH CV ECHO MEAS - MVA(P1/2T): 4.5 CM2
BH CV ECHO MEAS - MVA(VTI): 2.8 CM2
BH CV ECHO MEAS - PA ACC TIME: 0.09 SEC
BH CV ECHO MEAS - PA PR(ACCEL): 38.6 MMHG
BH CV ECHO MEAS - PA V2 MAX: 97.7 CM/SEC
BH CV ECHO MEAS - PULM A REVS DUR: 0.12 SEC
BH CV ECHO MEAS - PULM A REVS VEL: 28.3 CM/SEC
BH CV ECHO MEAS - PULM DIAS VEL: 46.2 CM/SEC
BH CV ECHO MEAS - PULM S/D: 1.02
BH CV ECHO MEAS - PULM SYS VEL: 47.1 CM/SEC
BH CV ECHO MEAS - QP/QS: 1.59
BH CV ECHO MEAS - RAP SYSTOLE: 3 MMHG
BH CV ECHO MEAS - RV MAX PG: 2.43 MMHG
BH CV ECHO MEAS - RV V1 MAX: 78 CM/SEC
BH CV ECHO MEAS - RV V1 VTI: 20.5 CM
BH CV ECHO MEAS - RVOT DIAM: 2.5 CM
BH CV ECHO MEAS - RVSP: 20 MMHG
BH CV ECHO MEAS - SI(MOD-SP2): 28.8 ML/M2
BH CV ECHO MEAS - SI(MOD-SP4): 33.4 ML/M2
BH CV ECHO MEAS - SV(LVOT): 63.7 ML
BH CV ECHO MEAS - SV(MOD-SP2): 63 ML
BH CV ECHO MEAS - SV(MOD-SP4): 73 ML
BH CV ECHO MEAS - SV(RVOT): 101.4 ML
BH CV ECHO MEAS - TAPSE (>1.6): 2.6 CM
BH CV ECHO MEAS - TR MAX PG: 16.8 MMHG
BH CV ECHO MEAS - TR MAX VEL: 205 CM/SEC
BH CV ECHO MEASUREMENTS AVERAGE E/E' RATIO: 9.05
BH CV XLRA - RV BASE: 3.2 CM
BH CV XLRA - RV LENGTH: 7.7 CM
BH CV XLRA - RV MID: 2.28 CM
BH CV XLRA - TDI S': 13.5 CM/SEC
LEFT ATRIUM VOLUME INDEX: 24.7 ML/M2
MAXIMAL PREDICTED HEART RATE: 164 BPM
SINUS: 3.4 CM
STJ: 2.8 CM
STRESS TARGET HR: 139 BPM

## 2023-05-15 PROCEDURE — 93306 TTE W/DOPPLER COMPLETE: CPT

## 2023-05-15 PROCEDURE — 93306 TTE W/DOPPLER COMPLETE: CPT | Performed by: INTERNAL MEDICINE

## 2023-05-15 PROCEDURE — 25510000001 PERFLUTREN (DEFINITY) 8.476 MG IN SODIUM CHLORIDE (PF) 0.9 % 10 ML INJECTION: Performed by: STUDENT IN AN ORGANIZED HEALTH CARE EDUCATION/TRAINING PROGRAM

## 2023-05-15 RX ADMIN — PERFLUTREN 2 ML: 6.52 INJECTION, SUSPENSION INTRAVENOUS at 08:22

## 2023-05-15 NOTE — PROGRESS NOTES
Mr. Casas, your heart ultrasound looks good.  There is no evidence of any significant heart muscle dysfunction to suggest heart failure or a heart reason for you to have lower extremity swelling.  Overall, this is very reassuring, we can further discuss any additional steps in management at heart clinic appointment on the 18th.  Look forward to meeting you.

## 2023-05-15 NOTE — TELEPHONE ENCOUNTER
PA STARTED VIA [employer] 05/15/2023    AWAITING RESPONSE FROM INSURANCE     Key: [id number]    Rx #: [id number]

## 2023-05-18 ENCOUNTER — OFFICE VISIT (OUTPATIENT)
Dept: CARDIOLOGY | Facility: CLINIC | Age: 58
End: 2023-05-18
Payer: COMMERCIAL

## 2023-05-18 VITALS
SYSTOLIC BLOOD PRESSURE: 116 MMHG | HEART RATE: 74 BPM | BODY MASS INDEX: 32.3 KG/M2 | DIASTOLIC BLOOD PRESSURE: 86 MMHG | WEIGHT: 225.6 LBS | HEIGHT: 70 IN | OXYGEN SATURATION: 99 %

## 2023-05-18 DIAGNOSIS — E78.5 HYPERLIPIDEMIA, UNSPECIFIED HYPERLIPIDEMIA TYPE: Primary | ICD-10-CM

## 2023-05-18 DIAGNOSIS — R07.2 PRECORDIAL PAIN: ICD-10-CM

## 2023-05-18 DIAGNOSIS — G47.10 HYPERSOMNOLENCE: ICD-10-CM

## 2023-05-18 DIAGNOSIS — I10 ESSENTIAL HYPERTENSION: ICD-10-CM

## 2023-05-18 DIAGNOSIS — R00.2 PALPITATIONS: ICD-10-CM

## 2023-05-18 DIAGNOSIS — I87.2 VENOUS INSUFFICIENCY: ICD-10-CM

## 2023-05-18 RX ORDER — FUROSEMIDE 20 MG/1
20 TABLET ORAL DAILY
Qty: 14 TABLET | Refills: 0 | Status: SHIPPED | OUTPATIENT
Start: 2023-05-18 | End: 2023-06-01

## 2023-05-18 RX ORDER — OMEPRAZOLE 20 MG/1
20 CAPSULE, DELAYED RELEASE ORAL DAILY
COMMUNITY

## 2023-05-18 NOTE — PROGRESS NOTES
[facility]    Subjective:     Encounter Date:05/18/23      Patient ID: Jose Luis Casas is a 57 y.o. male.    Chief Complaint:   Chief Complaint   Patient presents with   • Hospital Follow Up Visit     [facility] ed 5/1/23.      History of Present Illness    Mr. Casas is a 57-year-old gentleman past medical history hypertension, hyperlipidemia on atorvastatin who presents for further evaluation of dyspnea on exertion and lower extremity edema.  He has had ongoing hypertension for many years that has been difficult to control.  Worse in the point where he has some dyspnea on exertion, and he presented to the vascular surgery clinic to address his chronic lower extremity edema but his symptoms are concerning as systolics in the 170s, for which she was told to present to the ED for further evaluation.  His work-up in the ED was benign.  His proBNP was completely normal, his high-sensitivity troponin was8 his blood counts were normal and his CMP showed chronic hyponatremia but otherwise nothing acute.    He sees Dr. Davis in nephrology for chronic hyponatremia.  It was thought due to excessive fluid intake.  He previously was on a antihypertensive regimen including eplerenone, hydralazine, low-dose carvedilol, and all these were stopped.  He is now on carvedilol and lisinopril.    He is try different diuretic therapies in the past including Lasix, spironolactone, and this had worsened his hyponatremia.  He was initially presenting to the vascular surgery clinic to address the lower extremity swelling, but there was sent to the ER due to hypertension and shortness of breath.    Currently, he reports that his symptoms are stable.  He still reports intermittent episodes of chest pressure that occurs sporadically, at rest, accompanied with shortness of breath.  He states sometimes he gets lightheaded when he takes multiple deep breaths he denies any radiation of any of the pain or pressure, it is never  "exertional.  He also reports intermittent nocturnal palpitations    Cardiac history:  Interpretation Summary       •  Left ventricular systolic function is normal. Calculated left ventricular EF = 53.2%  •  Left ventricular diastolic function was normal.  •  There is mild calcification of the aortic valve without any significant stenosis or regurgitation present.      The following portions of the patient's history were reviewed and updated as appropriate: allergies, current medications, past family history, past medical history, past social history, past surgical history and problem list.    Past Medical History:   Diagnosis Date   • Colitis    • Degenerative disc disease, lumbar 03/16/2016   • Diverticulosis    • Edema, lower extremity    • Essential hypertension 03/16/2016   • GERD (gastroesophageal reflux disease)    • Hiatal hernia    • High cholesterol 02/05/2014   • History of COVID-19 02/2022   • Hydrocele    • Screening PSA (prostate specific antigen) 10/28/2016   • Tinnitus, bilateral    • Weakness of left lower extremity 03/16/2016       Past Surgical History:   Procedure Laterality Date   • COLONOSCOPY  01/13/2017   • COLONOSCOPY N/A 06/28/2021    Procedure: COLONOSCOPY;  Surgeon: Antonette Browning MD;  Location: [facility] ENDOSCOPY;  Service: Gastroenterology;  Laterality: N/A;  COLON POLYPS   • HYDROCELECTOMY  2015    x2   • TONSILLECTOMY             ECG 12 Lead    Date/Time: 5/18/2023 8:27 AM  Performed by: Scott Gonsales MD  Authorized by: Scott Gonsales MD   Comparison: compared with previous ECG from 5/1/2023  Rhythm: sinus rhythm  Rate: normal  Conduction: conduction normal  ST Segments: ST segments normal  T Waves: T waves normal  QRS axis: normal  Other: no other findings    Clinical impression: normal ECG               Objective:     Vitals:    05/18/23 0818   BP: 116/86   Pulse: 74   SpO2: 99%   Weight: 102 kg (225 lb 9.6 oz)   Height: 177.8 cm (70\")         Constitutional:       " Appearance: Healthy appearance. Not in distress.   Neck:      Vascular: JVD normal.   Pulmonary:      Effort: Pulmonary effort is normal.      Breath sounds: Normal breath sounds.   Cardiovascular:      PMI at left midclavicular line. Normal rate. Regular rhythm. Normal S2.      Murmurs: There is no murmur.      Comments: Hyperpigmentation of the medial aspects of bilateral lower extremities consistent with venous stasis dermatitis.  No JVD.  Normal lung exam.  Pulses:     Intact distal pulses.   Edema:     Pretibial: bilateral 2+ edema of the pretibial area.     Ankle: bilateral 3+ edema of the ankle.  Skin:     General: Skin is warm and dry.   Neurological:      General: No focal deficit present.      Mental Status: Alert, oriented to person, place, and time and oriented to person, place and time.   Psychiatric:         Mood and Affect: Mood and affect normal.         Lab Review:     Lipid Panel        11/2/2022    10:23 5/8/2023    11:11   Lipid Panel   Total Cholesterol 176   167     Triglycerides 118   114     HDL Cholesterol 52   47     VLDL Cholesterol 21   21     LDL Cholesterol  103   99     LDL/HDL Ratio 1.93   2.07       BUN   Date Value Ref Range Status   05/08/2023 10 6 - 20 mg/dL Final     Creatinine   Date Value Ref Range Status   05/08/2023 0.81 0.76 - 1.27 mg/dL Final     Potassium   Date Value Ref Range Status   05/08/2023 4.9 3.5 - 5.2 mmol/L Final     ALT (SGPT)   Date Value Ref Range Status   05/08/2023 26 1 - 41 U/L Final     AST (SGOT)   Date Value Ref Range Status   05/08/2023 24 1 - 40 U/L Final         Performed        Assessment:          Diagnosis Plan   1. Hyperlipidemia, unspecified hyperlipidemia type        2. Essential hypertension  ECG 12 Lead    Home Sleep Study    Stress Test With Myocardial Perfusion One Day    Holter Monitor - 72 Hour Up To 15 Days    Compression Stockings      3. Hypersomnolence  Home Sleep Study      4. Precordial pain  Stress Test With Myocardial Perfusion  One Day    Holter Monitor - 72 Hour Up To 15 Days      5. Venous insufficiency        6. Palpitations  Holter Monitor - 72 Hour Up To 15 Days             Plan:         1. Precordial pain: Intermittent chest pressure symptoms that occur sporadically, at rest, nonexertional.  Unlikely related to volume.  His BNP was normal, his ultrasound shows normal LVEF and an RVSP in the 20s  2. Hypertension  1. We will arrange for treadmill Cardiolite stress test  2. His hypertension is decently controlled today on carvedilol 25 twice daily and lisinopril 20.  Continue this regimen.  3. I am hesitant to add a diuretic like chlorthalidone due to his chronic hyponatremia, hopefully with lisinopril we can increase it to 40 if he continues to have hypertension.  3. Hyponatremia: He follows with nephrology.  I am hesitant to add any diuretics due to his chronic hyponatremia that they are following.  His blood pressure is currently stable and this is not needed for now.  4. Lower extremity edema, bilateral: His exam is characteristic of venous stasis changes.  1. I encouraged him to follow-up with vascular surgery for this.  I do not think diuretics are a great idea, his BNP was normal, his echo does not show signs of heart failure or increased pressures to suggest that diuretics are a good idea, and diuretics were met with hyponatremia in the past which suggests intravascular euvolemia  5. Palpitations, hypersomnolence  1. We will check home sleep test to evaluate for sleep apnea  2. Check 2-week Zio patch to investigate for arrhythmias    Thank you for allowing me to participate in the care of Jose Luis Casas. Feel free to contact me directly with any further questions or concerns.    RTC 3 months for symptom recheck.  Giving him a 14-day trial of oral Lasix as he states that helped his edema in the past, and hopefully this can get his swelling down a little bit from the fitted with compression stockings, for which I gave him a  prescription for today.  We will fit with Zio patch, await home sleep test, and arrange for stress test in interim.  I encourage patient to make appointment with vascular surgery    Scott Gonsales MD  [facility]  05/18/23  08:20 EDT       Current Outpatient Medications:   •  aspirin 81 MG EC tablet, Take 1 tablet by mouth Daily., Disp: 90 tablet, Rfl: 2  •  atorvastatin (LIPITOR) 40 MG tablet, Take 1 tablet by mouth Daily., Disp: 90 tablet, Rfl: 3  •  carvedilol (Coreg) 25 MG tablet, Take 1 tablet by mouth 2 (Two) Times a Day With Meals for 30 days., Disp: 180 tablet, Rfl: 2  •  Cholecalciferol 125 MCG (5000 UT) tablet, Take 1 tablet by mouth Daily., Disp: 90 tablet, Rfl: 2  •  fluticasone (FLONASE) 50 MCG/ACT nasal spray, 2 sprays into the nostril(s) as directed by provider Daily., Disp: 18.2 mL, Rfl: 2  •  furosemide (LASIX) 20 MG tablet, Take 1 tablet by mouth Daily for 14 days., Disp: 14 tablet, Rfl: 0  •  lisinopril (PRINIVIL,ZESTRIL) 20 MG tablet, Take 1 tablet by mouth Daily., Disp: 90 tablet, Rfl: 2  •  omeprazole (priLOSEC) 20 MG capsule, Take 1 capsule by mouth Daily., Disp: , Rfl:   •  tamsulosin (FLOMAX) 0.4 MG capsule 24 hr capsule, Take 1 capsule by mouth Daily., Disp: 90 capsule, Rfl: 3  •  Vibegron 75 MG tablet, Take 1 tablet by mouth Daily., Disp: 90 tablet, Rfl: 2         Return in about 3 months (around 8/18/2023).      Part of this note may be an electronic transcription/translation of spoken language to printed text using the Dragon Dictation System.

## 2023-05-18 NOTE — PATIENT INSTRUCTIONS
Your lower extremity swelling is consistent with something called venous insufficiency.  Is due to leaky veins.  It is best treated with trying to raise her legs at night.  I have refilled 14 days of the Lasix medicine to see if we get the swelling down, but this is not a great long-term solution as it can lead to low sodium levels.    To investigate your palpitation episodes, we would like to arrange for the 2-week patch monitor that we will monitor your heart rate and rhythm to make sure that there are no abnormal heart rhythms to explain your symptoms.    To look into the pressure-like sensation, we are going to obtain a stress test to look for any heart vessel blockages that would explain your symptoms.    I would highly recommend that you follow-up with the vascular surgeons to discuss the venous insufficiency, given the fact that your ultrasound of your heart did not show any significant heart failure to explain lower extremity swelling.

## 2023-05-31 ENCOUNTER — HOSPITAL ENCOUNTER (OUTPATIENT)
Dept: CARDIOLOGY | Facility: HOSPITAL | Age: 58
Discharge: HOME | End: 2023-05-31
Admitting: STUDENT IN AN ORGANIZED HEALTH CARE EDUCATION/TRAINING PROGRAM

## 2023-05-31 VITALS — HEIGHT: 70 IN | WEIGHT: 224.87 LBS | BODY MASS INDEX: 32.19 KG/M2

## 2023-05-31 DIAGNOSIS — I10 ESSENTIAL HYPERTENSION: ICD-10-CM

## 2023-05-31 DIAGNOSIS — R07.2 PRECORDIAL PAIN: ICD-10-CM

## 2023-05-31 LAB
BH CV NUCLEAR PRIOR STUDY: 2
BH CV REST NUCLEAR ISOTOPE DOSE: 10.8 MCI
BH CV STRESS BP STAGE 1: NORMAL
BH CV STRESS BP STAGE 2: NORMAL
BH CV STRESS DURATION MIN STAGE 1: 3
BH CV STRESS DURATION MIN STAGE 2: 3
BH CV STRESS DURATION MIN STAGE 3: 1
BH CV STRESS DURATION SEC STAGE 1: 0
BH CV STRESS DURATION SEC STAGE 2: 0
BH CV STRESS DURATION SEC STAGE 3: 14
BH CV STRESS GRADE STAGE 1: 10
BH CV STRESS GRADE STAGE 2: 12
BH CV STRESS GRADE STAGE 3: 14
BH CV STRESS HR STAGE 1: 102
BH CV STRESS HR STAGE 2: 113
BH CV STRESS HR STAGE 3: 120
BH CV STRESS METS STAGE 1: 5
BH CV STRESS METS STAGE 2: 7.5
BH CV STRESS METS STAGE 3: 8
BH CV STRESS NUCLEAR ISOTOPE DOSE: 35.9 MCI
BH CV STRESS PROTOCOL 1: NORMAL
BH CV STRESS PROTOCOL 2 BP STAGE 1: NORMAL
BH CV STRESS PROTOCOL 2 COMMENTS STAGE 1: NORMAL
BH CV STRESS PROTOCOL 2 DOSE REGADENOSON STAGE 1: 0.4
BH CV STRESS PROTOCOL 2 DURATION MIN STAGE 1: 0
BH CV STRESS PROTOCOL 2 DURATION SEC STAGE 1: 10
BH CV STRESS PROTOCOL 2 HR STAGE 1: 120
BH CV STRESS PROTOCOL 2 STAGE 1: 1
BH CV STRESS PROTOCOL 2: NORMAL
BH CV STRESS RECOVERY BP: NORMAL MMHG
BH CV STRESS RECOVERY HR: 94 BPM
BH CV STRESS SPEED STAGE 1: 1.7
BH CV STRESS SPEED STAGE 2: 2.5
BH CV STRESS SPEED STAGE 3: 3.4
BH CV STRESS STAGE 1: 1
BH CV STRESS STAGE 2: 2
BH CV STRESS STAGE 3: 3
LV EF NUC BP: 58 %
MAXIMAL PREDICTED HEART RATE: 164 BPM
PERCENT MAX PREDICTED HR: 73.17 %
STRESS BASELINE BP: NORMAL MMHG
STRESS BASELINE HR: 67 BPM
STRESS PERCENT HR: 86 %
STRESS POST EXERCISE DUR SEC: 10 SEC
STRESS POST PEAK BP: NORMAL MMHG
STRESS POST PEAK HR: 120 BPM
STRESS TARGET HR: 139 BPM

## 2023-05-31 PROCEDURE — A9502 TC99M TETROFOSMIN: HCPCS | Performed by: STUDENT IN AN ORGANIZED HEALTH CARE EDUCATION/TRAINING PROGRAM

## 2023-05-31 PROCEDURE — 25010000002 REGADENOSON 0.4 MG/5ML SOLUTION: Performed by: STUDENT IN AN ORGANIZED HEALTH CARE EDUCATION/TRAINING PROGRAM

## 2023-05-31 PROCEDURE — 0 TECHNETIUM TETROFOSMIN KIT: Performed by: STUDENT IN AN ORGANIZED HEALTH CARE EDUCATION/TRAINING PROGRAM

## 2023-05-31 PROCEDURE — 93017 CV STRESS TEST TRACING ONLY: CPT

## 2023-05-31 PROCEDURE — 78452 HT MUSCLE IMAGE SPECT MULT: CPT

## 2023-05-31 RX ORDER — REGADENOSON 0.08 MG/ML
0.4 INJECTION, SOLUTION INTRAVENOUS
Status: COMPLETED | OUTPATIENT
Start: 2023-05-31 | End: 2023-05-31

## 2023-05-31 RX ADMIN — TETROFOSMIN 1 DOSE: 1.38 INJECTION, POWDER, LYOPHILIZED, FOR SOLUTION INTRAVENOUS at 09:40

## 2023-05-31 RX ADMIN — TETROFOSMIN 1 DOSE: 1.38 INJECTION, POWDER, LYOPHILIZED, FOR SOLUTION INTRAVENOUS at 08:35

## 2023-05-31 RX ADMIN — REGADENOSON 0.4 MG: 0.08 INJECTION, SOLUTION INTRAVENOUS at 09:40

## 2023-06-02 ENCOUNTER — TELEPHONE (OUTPATIENT)
Dept: CARDIOLOGY | Facility: CLINIC | Age: 58
End: 2023-06-02

## 2023-06-08 ENCOUNTER — HOSPITAL ENCOUNTER (OUTPATIENT)
Dept: SLEEP MEDICINE | Facility: HOSPITAL | Age: 58
Discharge: HOME | End: 2023-06-08
Admitting: STUDENT IN AN ORGANIZED HEALTH CARE EDUCATION/TRAINING PROGRAM
Payer: COMMERCIAL

## 2023-06-08 DIAGNOSIS — G47.10 HYPERSOMNOLENCE: ICD-10-CM

## 2023-06-08 DIAGNOSIS — I10 ESSENTIAL HYPERTENSION: ICD-10-CM

## 2023-06-08 PROCEDURE — 95806 SLEEP STUDY UNATT&RESP EFFT: CPT

## 2023-06-08 PROCEDURE — 95806 SLEEP STUDY UNATT&RESP EFFT: CPT | Performed by: INTERNAL MEDICINE

## 2023-06-12 DIAGNOSIS — R06.83 SNORING: ICD-10-CM

## 2023-06-12 DIAGNOSIS — G47.33 OSA (OBSTRUCTIVE SLEEP APNEA): Primary | ICD-10-CM

## 2023-06-14 ENCOUNTER — TELEPHONE (OUTPATIENT)
Dept: SLEEP MEDICINE | Facility: HOSPITAL | Age: 58
End: 2023-06-14
Payer: COMMERCIAL

## 2023-06-22 PROBLEM — I87.2 VENOUS INSUFFICIENCY OF BOTH LOWER EXTREMITIES: Status: ACTIVE | Noted: 2023-06-22

## 2023-06-22 PROBLEM — Z12.5 SCREENING PSA (PROSTATE SPECIFIC ANTIGEN): Status: ACTIVE | Noted: 2023-06-22

## 2023-08-09 ENCOUNTER — OFFICE VISIT (OUTPATIENT)
Dept: INTERNAL MEDICINE | Facility: CLINIC | Age: 58
End: 2023-08-09
Payer: COMMERCIAL

## 2023-08-09 VITALS
TEMPERATURE: 97.3 F | WEIGHT: 231.8 LBS | BODY MASS INDEX: 33.18 KG/M2 | SYSTOLIC BLOOD PRESSURE: 135 MMHG | HEART RATE: 77 BPM | HEIGHT: 70 IN | DIASTOLIC BLOOD PRESSURE: 85 MMHG | OXYGEN SATURATION: 98 %

## 2023-08-09 DIAGNOSIS — K21.9 GASTROESOPHAGEAL REFLUX DISEASE WITHOUT ESOPHAGITIS: ICD-10-CM

## 2023-08-09 DIAGNOSIS — E66.09 CLASS 1 OBESITY DUE TO EXCESS CALORIES WITHOUT SERIOUS COMORBIDITY WITH BODY MASS INDEX (BMI) OF 32.0 TO 32.9 IN ADULT: ICD-10-CM

## 2023-08-09 DIAGNOSIS — R60.9 EDEMA, UNSPECIFIED TYPE: Primary | ICD-10-CM

## 2023-08-09 DIAGNOSIS — Z12.5 SCREENING PSA (PROSTATE SPECIFIC ANTIGEN): ICD-10-CM

## 2023-08-09 DIAGNOSIS — I10 ESSENTIAL HYPERTENSION: ICD-10-CM

## 2023-08-09 DIAGNOSIS — I87.2 VENOUS INSUFFICIENCY OF BOTH LOWER EXTREMITIES: ICD-10-CM

## 2023-08-09 DIAGNOSIS — E78.5 HYPERLIPIDEMIA, UNSPECIFIED HYPERLIPIDEMIA TYPE: ICD-10-CM

## 2023-08-09 PROCEDURE — 99214 OFFICE O/P EST MOD 30 MIN: CPT | Performed by: INTERNAL MEDICINE

## 2023-08-09 RX ORDER — CARVEDILOL PHOSPHATE 40 MG/1
40 CAPSULE, EXTENDED RELEASE ORAL DAILY
Qty: 90 CAPSULE | Refills: 3 | Status: SHIPPED | OUTPATIENT
Start: 2023-08-09

## 2023-08-09 RX ORDER — BENAZEPRIL HYDROCHLORIDE AND HYDROCHLOROTHIAZIDE 20; 12.5 MG/1; MG/1
1 TABLET ORAL DAILY
Qty: 90 TABLET | Refills: 3 | Status: SHIPPED | OUTPATIENT
Start: 2023-08-09

## 2023-09-05 ENCOUNTER — TELEPHONE (OUTPATIENT)
Dept: INTERNAL MEDICINE | Facility: CLINIC | Age: 58
End: 2023-09-05
Payer: COMMERCIAL

## 2023-09-05 ENCOUNTER — HOSPITAL ENCOUNTER (OUTPATIENT)
Dept: CT IMAGING | Facility: HOSPITAL | Age: 58
Discharge: HOME | End: 2023-09-05
Admitting: INTERNAL MEDICINE
Payer: COMMERCIAL

## 2023-09-05 DIAGNOSIS — R60.9 EDEMA, UNSPECIFIED TYPE: ICD-10-CM

## 2023-09-05 DIAGNOSIS — I10 ESSENTIAL HYPERTENSION: ICD-10-CM

## 2023-09-05 DIAGNOSIS — E66.09 CLASS 1 OBESITY DUE TO EXCESS CALORIES WITHOUT SERIOUS COMORBIDITY WITH BODY MASS INDEX (BMI) OF 32.0 TO 32.9 IN ADULT: ICD-10-CM

## 2023-09-05 DIAGNOSIS — E78.5 HYPERLIPIDEMIA, UNSPECIFIED HYPERLIPIDEMIA TYPE: ICD-10-CM

## 2023-09-05 DIAGNOSIS — K21.9 GASTROESOPHAGEAL REFLUX DISEASE WITHOUT ESOPHAGITIS: ICD-10-CM

## 2023-09-05 DIAGNOSIS — I87.2 VENOUS INSUFFICIENCY OF BOTH LOWER EXTREMITIES: ICD-10-CM

## 2023-09-05 DIAGNOSIS — Z12.5 SCREENING PSA (PROSTATE SPECIFIC ANTIGEN): ICD-10-CM

## 2023-09-05 PROCEDURE — 71271 CT THORAX LUNG CANCER SCR C-: CPT

## 2023-09-05 NOTE — TELEPHONE ENCOUNTER
Call patient make him an appointment to see me through telehealth or in the office briefly to go over test results on the CAT scan, that can be done this week or next

## 2023-09-06 ENCOUNTER — TELEPHONE (OUTPATIENT)
Dept: INTERNAL MEDICINE | Facility: CLINIC | Age: 58
End: 2023-09-06
Payer: COMMERCIAL

## 2023-09-06 DIAGNOSIS — R60.9 EDEMA, UNSPECIFIED TYPE: Primary | ICD-10-CM

## 2023-09-06 NOTE — TELEPHONE ENCOUNTER
The clinic called stating the referral that we sent they cannot use and to send a new one that they can use. I have pended it to be signed.

## 2023-09-06 NOTE — TELEPHONE ENCOUNTER
Call pt lv to call us back     Patient needs to be scheduled as telehealth with Gautam Mendez for hub to scheduled.

## 2023-09-11 ENCOUNTER — TELEMEDICINE (OUTPATIENT)
Dept: INTERNAL MEDICINE | Facility: CLINIC | Age: 58
End: 2023-09-11
Payer: COMMERCIAL

## 2023-09-11 DIAGNOSIS — J98.59 MEDIASTINAL MASS: Primary | ICD-10-CM

## 2023-09-11 DIAGNOSIS — I25.10 CORONARY ARTERY DISEASE INVOLVING NATIVE CORONARY ARTERY OF NATIVE HEART WITHOUT ANGINA PECTORIS: ICD-10-CM

## 2023-09-11 DIAGNOSIS — I87.2 VENOUS INSUFFICIENCY OF BOTH LOWER EXTREMITIES: ICD-10-CM

## 2023-09-11 DIAGNOSIS — E78.5 HYPERLIPIDEMIA, UNSPECIFIED HYPERLIPIDEMIA TYPE: ICD-10-CM

## 2023-09-11 DIAGNOSIS — I10 ESSENTIAL HYPERTENSION: ICD-10-CM

## 2023-09-11 PROCEDURE — 99213 OFFICE O/P EST LOW 20 MIN: CPT | Performed by: INTERNAL MEDICINE

## 2023-09-11 NOTE — PROGRESS NOTES
Patient is here for telehealth visit, consents to visit, patient is being seen by audio and video, patient is being seen at home and I am performing the visit at my office.    CHIEF COMPLAINT/ HPI: Patient is here to follow-up with an abnormal CT scan finding showing a mediastinal mass that could be thymic tissue versus other potential lesions, he has a few small nodules, on the CT scan and this is being reviewed by telehealth today,  No chief complaint on file.              Objective   Vital Signs, no fever,   There were no vitals filed for this visit.   There is no height or weight on file to calculate BMI.  Review of Systems no change in breathing status no shortness of breath no chest pains, no hemoptysis no weight loss or night sweats,  Physical Exam patient is alert and oriented x3 very talkative pleasant  Result Review :   Lab Results   Component Value Date    PROBNP 24.9 05/01/2023     CMP          3/27/2023    12:19 5/1/2023    11:26 5/8/2023    11:11   CMP   Glucose 111  96  98    BUN 11  11  10    Creatinine 0.80  0.86  0.81    EGFR 103.9  101.6  103.5    Sodium 137  133  134    Potassium 4.7  4.4  4.9    Chloride 101  99  97    Calcium 9.9  9.9  9.8    Total Protein 7.4  6.9  7.6    Albumin 4.6  4.4  5.0    Globulin 2.8  2.5  2.6    Total Bilirubin 0.4  0.6  0.5    Alkaline Phosphatase 61  62  60    AST (SGOT) 23  19  24    ALT (SGPT) 26  23  26    Albumin/Globulin Ratio 1.6  1.8  1.9    BUN/Creatinine Ratio 13.8  12.8  12.3    Anion Gap 9.0  10.0  8.0      CBC w/diff          3/27/2023    12:19 5/1/2023    11:26 5/8/2023    11:11   CBC w/Diff   WBC 6.53  7.67  6.97    RBC 4.65  4.61  4.75    Hemoglobin 14.7  14.3  15.1    Hematocrit 43.2  43.1  44.2    MCV 92.9  93.5  93.1    MCH 31.6  31.0  31.8    MCHC 34.0  33.2  34.2    RDW 12.0  12.4  11.8    Platelets 302  274  280    Neutrophil Rel % 54.2  55.9  50.0    Immature Granulocyte Rel % 0.3  0.3  0.3    Lymphocyte Rel % 33.5  30.0  35.7    Monocyte Rel  % 8.1  9.9  10.5    Eosinophil Rel % 3.1  3.5  2.9    Basophil Rel % 0.8  0.4  0.6       Lipid Panel          11/2/2022    10:23 5/8/2023    11:11   Lipid Panel   Total Cholesterol 176  167    Triglycerides 118  114    HDL Cholesterol 52  47    VLDL Cholesterol 21  21    LDL Cholesterol  103  99    LDL/HDL Ratio 1.93  2.07       Lab Results   Component Value Date    TSH 3.060 05/08/2023    TSH 2.840 04/06/2022    TSH 3.250 04/02/2021      Lab Results   Component Value Date    FREET4 1.0 06/26/2020                          Visit Diagnoses:    ICD-10-CM ICD-9-CM   1. Mediastinal mass  J98.59 786.6   2. Coronary artery disease involving native coronary artery of native heart without angina pectoris  I25.10 414.01   3. Essential hypertension  I10 401.9   4. Hyperlipidemia, unspecified hyperlipidemia type  E78.5 272.4   5. Venous insufficiency of both lower extremities  I87.2 459.81       Assessment and Plan   Diagnoses and all orders for this visit:    1. Mediastinal mass (Primary)  -     Ambulatory Referral to Thoracic Surgery    2. Coronary artery disease involving native coronary artery of native heart without angina pectoris    3. Essential hypertension    4. Hyperlipidemia, unspecified hyperlipidemia type    5. Venous insufficiency of both lower extremities        BMI is >= 30 and <35. (Class 1 Obesity). The following options were offered after discussion;: exercise counseling/recommendations     Htn------ April 2023, cont lisinopril 20 mg qd, carvedilol 25 mg twice a day--, we are going to switch his lisinopril to Lotensin HCTZ to take an hour before he takes his Lasix in the morning to help with diuresis, will get a switch to carvedilol to Coreg extended release 40 mg daily and he will monitor his blood pressures, discussed August 9, 2023    nuclear medicine stress test, May 31, 2023, -----normal ejection fraction normal perfusion study ----no evidence of ischemia ,  low risk study    Le edema , chronic venous  insufficiency---- cont lasix 20 mg qd ----venous insufficiency testing, April 28, 2023 shows no evidence of DVT in either leg,---discussed rx with ace and or compression and diuretics added Lotensin HCTZ to his medical regimen and stopped lisinopril    Overwgt , discussed wgt loss     Bph , cont flomax 0,4 mg qhs     Gerd , cont omeprazole 20 mg qd     Vitamin D deficiency, continues vitamin D replacement 5000 units daily    Hyperlipidemia continues atorvastatin 40 mg daily    Quit tobacco 2017--several cigarettes a day on average, low-dose CT scan for lung cancer screening, results discussed by telehealth today September 11, 2023,, this shows tiny likely benign lung nodules recommended continued annual screening there is biapical scarring without active pulmonary process there is an incidental rounded hypodense lesion in the anterior mediastinal area differential diagnosis includes a thymoma versus a thymic cyst no overly suspicious features to suggest cancer or malignancy and outside CT would be helpful and nonemergent cardiothoracic surgical consultation could be considered, he does have aortic and coronary atherosclerotic disease, discussed cardiac risks smoking cessation and treatment for high cholesterol as above September 11, 2023, I put a referral into Dr. Sterling cardiothoracic and [facility] in [place] for second opinion,    Aspirin usage discussed with patient August 9, 2023,---I will rec asa 81 mg qd for now     Social alcohol,    Follow Up   No follow-ups on file.  Patient was given instructions and counseling regarding his condition or for health maintenance advice. Please see specific information pulled into the AVS if appropriate.

## 2023-09-12 DIAGNOSIS — J30.89 ALLERGIC RHINITIS DUE TO OTHER ALLERGIC TRIGGER, UNSPECIFIED SEASONALITY: ICD-10-CM

## 2023-09-12 RX ORDER — FLUTICASONE PROPIONATE 50 MCG
SPRAY, SUSPENSION (ML) NASAL
Qty: 16 G | Refills: 2 | Status: SHIPPED | OUTPATIENT
Start: 2023-09-12

## 2023-09-27 ENCOUNTER — TELEPHONE (OUTPATIENT)
Dept: SURGERY | Facility: CLINIC | Age: 58
End: 2023-09-27
Payer: COMMERCIAL

## 2023-09-27 NOTE — TELEPHONE ENCOUNTER
PT SCHEDULED 10/11/23   CALLED PT REGARDING NEW PT APPOINTMENT NO ANSWER LEFT MESSAGE WITH APPOINTMENT DETAILS AND [facility] BACK NUMBER IF PT NEEDS TO RESCHEDULE

## 2023-10-04 ENCOUNTER — HOSPITAL ENCOUNTER (OUTPATIENT)
Dept: OCCUPATIONAL THERAPY | Facility: HOSPITAL | Age: 58
Setting detail: THERAPIES SERIES
Discharge: HOME | End: 2023-10-04
Payer: COMMERCIAL

## 2023-10-04 DIAGNOSIS — I89.0 LYMPHEDEMA: ICD-10-CM

## 2023-10-04 DIAGNOSIS — I87.2 CHRONIC VENOUS INSUFFICIENCY: Primary | ICD-10-CM

## 2023-10-04 DIAGNOSIS — I83.93 VARICOSE VEINS OF BOTH LOWER EXTREMITIES, UNSPECIFIED WHETHER COMPLICATED: ICD-10-CM

## 2023-10-04 PROCEDURE — 97165 OT EVAL LOW COMPLEX 30 MIN: CPT

## 2023-10-04 NOTE — THERAPY EVALUATION
Outpatient Occupational Therapy Lymphedema Initial Evaluation   [place]     Patient Name: Jose Luis Casas  : 1965  MRN: [id number]  Today's Date: 10/4/2023      Visit Date: 10/04/2023    Patient Active Problem List   Diagnosis    Essential hypertension    Hyperlipidemia    Hiatal hernia    Class 1 obesity due to excess calories without serious comorbidity with body mass index (BMI) of 32.0 to 32.9 in adult    Fatigue    Tinnitus    Esophageal reflux    Edema    Degenerative disc disease, lumbar    Hydrocele    Paresis of single lower extremity    Gynecomastia, male    Benign prostatic hyperplasia without lower urinary tract symptoms    Allergic rhinitis    Venous insufficiency of both lower extremities    Screening PSA (prostate specific antigen)    Mediastinal mass    Coronary artery disease involving native coronary artery of native heart without angina pectoris        Past Medical History:   Diagnosis Date    Colitis     Degenerative disc disease, lumbar 2016    Diverticulosis     Edema, lower extremity     Essential hypertension 2016    GERD (gastroesophageal reflux disease)     Hiatal hernia     High cholesterol 2014    History of COVID-19 2022    Hydrocele     Screening PSA (prostate specific antigen) 10/28/2016    Tinnitus, bilateral     Weakness of left lower extremity 2016        Past Surgical History:   Procedure Laterality Date    COLONOSCOPY  2017    COLONOSCOPY N/A 2021    Procedure: COLONOSCOPY;  Surgeon: Antonette Browning MD;  Location: [facility]COPY;  Service: Gastroenterology;  Laterality: N/A;  COLON POLYPS    HYDROCELECTOMY  2015    x2    TONSILLECTOMY           Visit Dx:     ICD-10-CM ICD-9-CM   1. Chronic venous insufficiency  I87.2 459.81   2. Lymphedema  I89.0 457.1   3. 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 No  -SF         Fall Risk Assessment    Any falls in the past year: No  -SF      Does patient have a fear of falling No  -SF         Services    Prior Rehab/Home Health Experiences No  -SF      Are you currently receiving Home Health services No  -SF      Do you plan to receive Home Health services in the near future No  -SF         Daily Activities    Primary Language English  -SF      Are you able to read Yes  -SF      Are you able to write Yes  -SF      How does patient learn best? Listening;Reading;Demonstration;Pictures/Video  -SF         Safety    Are you being hurt, hit, or frightened by anyone at home or in your life? No  -SF      Are you being neglected by a caregiver No  -SF      Have you had any of the following issues with N/A  -SF                User Key  (r) = Recorded By, (t) = Taken By, (c) = Cosigned By      Initials Name Provider Type    SF Maddie Razo OT Occupational Therapist                     Lymphedema       Row Name 10/04/23 1200             Subjective Pain    Able to rate subjective pain? yes  -SF      Pre-Treatment Pain Level 0  -SF      Post-Treatment Pain Level 0  -SF      Subjective Pain Comment Patient denies pain  -SF         Subjective    Subjective Comments Patient reports he has tried compression in the past but it caused him discomfort. Patient reports significant hx of swelling. Patient reports he walks for exercise and does see improvements in swelling. Patient also reports that elevation no longer improves swelling.  -SF         Lymphedema Assessment    Lymphedema Classification RLE:;LLE:;stage 2 (Spontaneously Irreversible)  -SF       "   LLIS - Physical Concerns    The amount of pain associated with my lymphedema is: 0  -SF      The amount of limb heaviness associated with my lymphedema is: 4  -SF      The amount of skin tightness associated with my lymphedema is: 0  -SF      The size of my swollen limb(s) seems: 2  -SF      Lymphedema affects the movement of my swollen limb(s): 0  -SF      The strength in my swollen limb(s) is: 2  -SF         LLIS - Psychosocial Concerns    Lymphedema affects my body image (i.e., \"how I think I look\"). 4  -SF      Lymphedema affects my socializing with others. 4  -SF      Lymphedema affects my intimate relations with spouse or partner (rate 0 if not applicable 0  -SF      Lymphedema \"gets me down\" (i.e., depression, frustration, or anger) 4  -SF      I must rely on others for help due to my lymphedema. 0  -SF      I know what to do to manage my lymphedema 4  -SF         LLIS - Functional Concerns    Lymphedema affects my ability to perform self-care activities (i.e. eating, dressing, hygiene) 0  -SF      Lymphedema affects my ability to perform routine home or work-related activities. 0  -SF      Lymphedema affects my performance of preferred leisure activities. 4  -SF      Lymphedema affects proper fit of clothing/shoes 3  -SF      Lymphedema affects my sleep 0  -SF         Posture/Observations    Posture- WNL Posture is WNL  -SF         General ROM    GENERAL ROM COMMENTS BLE WFL  -SF         MMT (Manual Muscle Testing)    General MMT Comments BLE WFL  -SF         Lymphedema Edema Assessment    Pitting Edema Moderate  -SF      Stemmer Sign right:;left:;positive  -SF      [place]un Hump right:;left:;negative  -SF         Skin Changes/Observations    Location/Assessment Lower Extremity  -SF      Lower Extremity Conditions bilateral:;normal;intact;clean  -SF      Lower Extremity Color/Pigment bilateral:;erythema  Hemosiderin staining  -SF      Skin Observations Comment Patient presents with varicose veins on " bilateral lower extremities  -SF         Lymphedema Measurements    Measurement Type(s) Circumferential  -SF      Circumferential Areas Lower extremities  -SF         Lymphedema Life Impact Scale Totals    A.  Total Q1 - Q17 (Do not include Q18) 31  -SF      B.  Total number of questions answered (Q1-Q17) 17  -SF      C. Divide A by B 1.82  -SF      D. Multiple C by 25 45.5  -SF                User Key  (r) = Recorded By, (t) = Taken By, (c) = Cosigned By      Initials Name Provider Type    Maddie Clements OT Occupational Therapist                  Circumferential Measurements:        Baseline: R: 2630.83 ml L: 2658.56 ml  % volume change: R: %   L:  %            Therapy Education  Education Details: Therapist educating pt on complete decongestive therapy and the process of OT therapy services. Therapist explaining to pt that once pt has reached a plateau in lymph volume reduction therapist work with a separate company to obtain permanent garments. Therapist discussing with pt regarding therapist requesting benefit checks from said company for garment coverage. Therapist recommending pt for at least 24 hours and don compression wrapping longer if possible. Therapist explaining to pt the longer he remains in compression wrapping the quicker pt can reach plateau and work towards obtaining permanent garments. Therapist explaining to bring compression back and will be reused. Pt will end up with a second set for wash and wear. Therapist providing pt with new HEP. Therapist providing written and visual demonstration of technique with safety cues to prevent injury. Handouts provided. Patient provided with education on a simple self-manual lymphatic drainage technique. Handouts provided. Pt educated on signs and symptoms of infection. Therapist urging pt to seek doctor evaluation if signs and symptoms occur and to remove any compression. Therapist educating pt regarding pt must be on antibiotic for 72 hours if there is an  infection prior to continuing compression to prevent the spread of infection.  Given: Symptoms/condition management, Edema management, HEP  Program: New  How Provided: Verbal, Demonstration, Written  Provided to: Patient  Level of Understanding: Verbalized         OT Goals       Row Name 10/04/23 1448          Time Calculation    OT Goal Re-Cert Due Date 11/03/23  -               User Key  (r) = Recorded By, (t) = Taken By, (c) = Cosigned By      Initials Name Provider Type    Maddie Clements OT Occupational Therapist                  GOALS:  1. Uncontrolled lymphedema of bilateral lower extremity/lower quadrant.  LTG1:  90 days:  Volumetric measurements of bilateral lower extremity/lower quadrant will be decreased by 4 % or until plateau in reduction is achieved to improve functional mobility.           STATUS:  New   STG1a:  30 days:  Volumetric measurements of bilateral lower extremity/lower quadrant will be decreased by 2 % bilaterally to improve functional mobility.    STATUS:  New  TREATMENT:  Manual Therapy, Therapeutic exercise, ADL/self-care therapy, Bioimpedence Fluid Analysis, Orthotic management and training, Therapeutic Activity, wound dressing as needed, Modalities: TENS, NMES, Ultrasound, Fluidotherapy, , and Patient and family/caregiver education.    2. Patient with decreased ADL/Self-Care routine for lymphedema management.   LTG 2:  90 days:  Patient/caregiver will independently verbalize/demonstrate ADL/Self-Care routine for lymphedema management.           STATUS:  New   STG 2a:  30 days:  Patient/caregiver will independently perform or verbally dictate compression wrapping technique of the lower extremity/lower quadrant.           STATUS:  New   STG2b:  30 days:  Patient will independently verbalize signs and symptoms of infection.                    STATUS:  New   STG2c:  30 days:  Patient will independently demonstrate/verbalize proper skin care routine to prevent infection and or wound  development.            STATUS:  New  STG2d:  30 days:  Patient will independently perform teach back of HEP routine.           STATUS: New  STG2e:  30 days:  Patient/caregiver will obtain properly fitting compression orthosis, will demonstrate don/doff skill of compression orthosis with modified independence, and independently verbalize wear schedule.          STATUS: New   STG2f: 30 days: Patient/caregiver will independently perform self-manual lymphatic drainage of the lower extremity/lower quadrant.          STATUS: New  TREATMENT:  Therapeutic Activity, Patient/Caregiver Education, ADL retraining, Manual Therapy, Orthotic Management and training, Modalities: TENS, NMES, Ultrasound, Fluidotherapy Wound dressing if necessary,  Therapeutic Exercise, Modalities     3. Self-Care Limitations     LTG 3: 90 days:  The patient will demonstrate improved quality of life by achieving a score of 30 on the Lymphedema Life Impact Scale.           STATUS:  New   STG 3a: 30 days:  The patient will demonstrate improved quality of life by achieving a score of 40 on the Lymphedema Life Impact Scale.           STATUS:  New  TREATMENT:  Manual therapy, therapeutic exercise, therapeutic activity, ADL retraining, Patient/caregiver education, Modalities: TENS, NMES, Ultrasound, Fluidotherapy Orthotic Management and Training, Wound dressing as needed.         OT Assessment/Plan       Row Name 10/04/23 1257          OT Assessment    Functional Limitations Limitations in functional capacity and performance;Performance in leisure activities  -SF     Impairments Impaired lymphatic circulation;Impaired venous circulation  -SF     Assessment Comments Patient is a 57 year old male presenting w/ BLE lymphedema. Patient's lymphedema is accompanied by limb heaviness and skin changes.  Patient does report history of wounds with occasional blisters with lymphorrhea exudate.  Patient has participated in exercise, elevation and compression for  greater than 30 days without significant improvement or reduction in his lymphedema.  Complete decongestive therapy is indicated for this patient to decrease lymph volume to prevent lymphedema from advancing to greater stage. Patient would benefit from skilled occupational therapy to facilitate a reduction in lymph volume until a plateau is met, patient has obtained permanent compression garments and patient is independent w/ complete decongestive therapy.  -SF     OT Diagnosis Lymphedema, chronic venous insufficiency  -SF     OT Rehab Potential Good  -SF     Patient would benefit from skilled therapy intervention Yes  -SF        OT Plan    OT Frequency 2x/week;3x/week  -SF     Predicted Duration of Therapy Intervention (OT) 12 weeks  -SF     Planned CPT's? OT EVAL LOW COMPLEXITY: 66792;OT EVAL MOD COMPLEXITY: 03593;OT EVAL HIGH COMPLEXITY: 23889;OT RE-EVAL: 75070;OT THER ACT EA 15 MIN: 11245WH;OT THER PROC EA 15 MIN: 40575TQ;OT SELF CARE/MGMT/TRAIN 15 MIN: 58340;OT ORTHOTIC MGMT/TRAIN EA 15 MIN: 12170;OT ORTHO/PROSTHET CHECKOUT EA 15 MIN: 59873;OT CARE PLAN EA 15 MIN;OT MANUAL THERAPY EA 15 MIN: 94710  -SF     Planned Therapy Interventions (Optional Details) home exercise program;manual therapy techniques;orthotic fitting/training;patient/family education;prosthetic fitting/training;ROM (Range of Motion);strengthening;stretching  -SF     OT Plan Comments Initiate POC  -SF               User Key  (r) = Recorded By, (t) = Taken By, (c) = Cosigned By      Initials Name Provider Type    Maddie Clements OT Occupational Therapist                          OT eval complexity:    Examination:    Performance Deficits include:   Health Management, Home Management, Leisure Participation, Job Performance   # deficits affecting performance:   4   Decision Making:       Treatment Options Considered : Health Promotion, Prevention, Remediation/Restoration   # Treatment options considered : Several (3)    Modification Made for: None    Level of Task Modification: None    Comorbidity Affect Performance: None    How is performance affected NA   Evaluation Level Determined:   Low       Time Calculation:   Untimed Charges  OT Eval/Re-eval Minutes: 75  Total Minutes  Untimed Charges Total Minutes: 75   Total Minutes: 75     Therapy Charges for Today       Code Description Service Date Service Provider Modifiers Qty    99592285640 HC OT EVAL LOW COMPLEXITY 4 10/4/2023 Maddie Razo OT GO 1                      Maddie Razo OT  10/4/2023

## 2023-10-11 ENCOUNTER — OFFICE VISIT (OUTPATIENT)
Dept: SURGERY | Facility: CLINIC | Age: 58
End: 2023-10-11
Payer: COMMERCIAL

## 2023-10-11 VITALS
BODY MASS INDEX: 31.5 KG/M2 | WEIGHT: 220 LBS | SYSTOLIC BLOOD PRESSURE: 128 MMHG | DIASTOLIC BLOOD PRESSURE: 78 MMHG | HEIGHT: 70 IN

## 2023-10-11 DIAGNOSIS — J98.59 MEDIASTINAL MASS: Primary | ICD-10-CM

## 2023-10-11 NOTE — PROGRESS NOTES
"Chief Complaint  Anterior mediastinal mass    Subjective        Jose Luis Casas presents to [facility] THORACIC SURGERY  History of Present Illness  Mr. Casas is a very pleasant 57-year-old gentleman I was asked to see for an incidentally found anterior mediastinal mass on CT scan of the chest.  He is an ex-smoker and he smoked approximately 35 years half a pack per day.  He stopped smoking back in 2017.  This anterior mediastinal mass was found on a low-dose CT scan of the chest due to his history of smoking.  He denies any current symptoms.  He denies any fevers, chills and/or cough.  He denies any chest pain.    Objective   Vital Signs:  /78 (BP Location: Left arm, Patient Position: Sitting, Cuff Size: Adult)   Ht 177.8 cm (70\")   Wt 99.8 kg (220 lb)   BMI 31.57 kg/mý   Estimated body mass index is 31.57 kg/mý as calculated from the following:    Height as of this encounter: 177.8 cm (70\").    Weight as of this encounter: 99.8 kg (220 lb).               Physical Exam  Vitals reviewed.   Constitutional:       Appearance: Normal appearance.   Cardiovascular:      Rate and Rhythm: Normal rate and regular rhythm.      Pulses: Normal pulses.      Heart sounds: Normal heart sounds.   Pulmonary:      Effort: Pulmonary effort is normal.      Breath sounds: Normal breath sounds.   Skin:     Capillary Refill: Capillary refill takes less than 2 seconds.   Neurological:      General: No focal deficit present.      Mental Status: He is alert and oriented to person, place, and time.   Psychiatric:         Mood and Affect: Mood normal.         Behavior: Behavior normal.         Thought Content: Thought content normal.         Judgment: Judgment normal.        Result Review :  CT scan of the chest was visualized and reviewed by myself.  He has a 2.2 x 1.  8 cm anterior mediastinal mass.           Assessment and Plan   Diagnoses and all orders for this visit:    1. Mediastinal mass (Primary)  -     hCG, " Serum, Qualitative; Future  -     AFP Tumor Marker; Future  -     Lactate Dehydrogenase; Future    Mr. Casas is a very pleasant 57-year-old gentleman who presents with an incidentally found anterior mediastinal mass on a low-dose screening CT scan due to his history of smoking.  He is currently asymptomatic.  Based on the imaging, this looks like a possible thymoma.  I would like to get a CT/PET scan to evaluate for possible metabolic activity.  In addition, I would like to order tumor markers consistent with seminomatous versus nonseminomatous germ cell tumors.  Usually with anterior mediastinal masses would like to get a beta-hCG, alpha-fetoprotein and LDH levels.    These will allow us to guide our treatment.    If his CT/PET scan does not show any areas of hypermetabolic activity and all of his tumor markers are appropriate levels, I would recommend surgical excision as this lesion does not appear to invade any surrounding structures.    Surgical excision will allow us to biopsy and treat this lesion at the same time.  Once his CT/PET scan is performed and his tumor markers have been evaluated we will see him back in clinic today to see if surgical excision versus biopsy would be warranted.  We will see him back in clinic in a few weeks once he is tested and performed.       I spent 60 minutes caring for Jose Luis on this date of service. This time includes time spent by me in the following activities:preparing for the visit, reviewing tests, obtaining and/or reviewing a separately obtained history, performing a medically appropriate examination and/or evaluation , counseling and educating the patient/family/caregiver, ordering medications, tests, or procedures, referring and communicating with other health care professionals , documenting information in the medical record, independently interpreting results and communicating that information with the patient/family/caregiver, and care coordination  Follow Up   No  follow-ups on file.  Patient was given instructions and counseling regarding his condition or for health maintenance advice. Please see specific information pulled into the AVS if appropriate.

## 2023-10-16 ENCOUNTER — HOSPITAL ENCOUNTER (OUTPATIENT)
Dept: PET IMAGING | Facility: HOSPITAL | Age: 58
Discharge: HOME | End: 2023-10-16
Payer: COMMERCIAL

## 2023-10-16 DIAGNOSIS — J98.59 MEDIASTINAL MASS: ICD-10-CM

## 2023-10-16 LAB — GLUCOSE BLDC GLUCOMTR-MCNC: 81 MG/DL (ref 70–130)

## 2023-10-16 PROCEDURE — A9552 F18 FDG: HCPCS | Performed by: STUDENT IN AN ORGANIZED HEALTH CARE EDUCATION/TRAINING PROGRAM

## 2023-10-16 PROCEDURE — 0 FLUDEOXYGLUCOSE F18 SOLUTION: Performed by: STUDENT IN AN ORGANIZED HEALTH CARE EDUCATION/TRAINING PROGRAM

## 2023-10-16 PROCEDURE — 82948 REAGENT STRIP/BLOOD GLUCOSE: CPT

## 2023-10-16 PROCEDURE — 78815 PET IMAGE W/CT SKULL-THIGH: CPT

## 2023-10-16 RX ADMIN — FLUDEOXYGLUCOSE F18 1 DOSE: 300 INJECTION INTRAVENOUS at 12:48

## 2023-10-17 ENCOUNTER — LAB (OUTPATIENT)
Dept: LAB | Facility: HOSPITAL | Age: 58
End: 2023-10-17
Payer: COMMERCIAL

## 2023-10-17 DIAGNOSIS — N40.0 BENIGN PROSTATIC HYPERPLASIA WITHOUT LOWER URINARY TRACT SYMPTOMS: ICD-10-CM

## 2023-10-17 DIAGNOSIS — I10 ESSENTIAL HYPERTENSION: ICD-10-CM

## 2023-10-17 DIAGNOSIS — Z12.5 SCREENING PSA (PROSTATE SPECIFIC ANTIGEN): ICD-10-CM

## 2023-10-17 DIAGNOSIS — E66.09 CLASS 1 OBESITY DUE TO EXCESS CALORIES WITHOUT SERIOUS COMORBIDITY WITH BODY MASS INDEX (BMI) OF 32.0 TO 32.9 IN ADULT: ICD-10-CM

## 2023-10-17 DIAGNOSIS — K21.9 GASTROESOPHAGEAL REFLUX DISEASE WITHOUT ESOPHAGITIS: ICD-10-CM

## 2023-10-17 DIAGNOSIS — R60.9 EDEMA, UNSPECIFIED TYPE: ICD-10-CM

## 2023-10-17 DIAGNOSIS — E78.5 HYPERLIPIDEMIA, UNSPECIFIED HYPERLIPIDEMIA TYPE: ICD-10-CM

## 2023-10-17 DIAGNOSIS — J30.89 ALLERGIC RHINITIS DUE TO OTHER ALLERGIC TRIGGER, UNSPECIFIED SEASONALITY: ICD-10-CM

## 2023-10-17 DIAGNOSIS — I87.2 VENOUS INSUFFICIENCY OF BOTH LOWER EXTREMITIES: ICD-10-CM

## 2023-10-17 LAB
25(OH)D3 SERPL-MCNC: 48.8 NG/ML (ref 30–100)
ALBUMIN SERPL-MCNC: 4.6 G/DL (ref 3.5–5.2)
ALBUMIN/GLOB SERPL: 1.8 G/DL
ALP SERPL-CCNC: 59 U/L (ref 39–117)
ALT SERPL W P-5'-P-CCNC: 27 U/L (ref 1–41)
ANION GAP SERPL CALCULATED.3IONS-SCNC: 9 MMOL/L (ref 5–15)
AST SERPL-CCNC: 21 U/L (ref 1–40)
BASOPHILS # BLD AUTO: 0.03 10*3/MM3 (ref 0–0.2)
BASOPHILS NFR BLD AUTO: 0.4 % (ref 0–1.5)
BILIRUB SERPL-MCNC: 0.4 MG/DL (ref 0–1.2)
BUN SERPL-MCNC: 11 MG/DL (ref 6–20)
BUN/CREAT SERPL: 12.6 (ref 7–25)
CALCIUM SPEC-SCNC: 10 MG/DL (ref 8.6–10.5)
CHLORIDE SERPL-SCNC: 93 MMOL/L (ref 98–107)
CHOLEST SERPL-MCNC: 165 MG/DL (ref 0–200)
CO2 SERPL-SCNC: 27 MMOL/L (ref 22–29)
CREAT SERPL-MCNC: 0.87 MG/DL (ref 0.76–1.27)
DEPRECATED RDW RBC AUTO: 39.7 FL (ref 37–54)
EGFRCR SERPLBLD CKD-EPI 2021: 101.3 ML/MIN/1.73
EOSINOPHIL # BLD AUTO: 0.22 10*3/MM3 (ref 0–0.4)
EOSINOPHIL NFR BLD AUTO: 3.3 % (ref 0.3–6.2)
ERYTHROCYTE [DISTWIDTH] IN BLOOD BY AUTOMATED COUNT: 12.2 % (ref 12.3–15.4)
FOLATE SERPL-MCNC: 6.14 NG/ML (ref 4.78–24.2)
GLOBULIN UR ELPH-MCNC: 2.6 GM/DL
GLUCOSE SERPL-MCNC: 94 MG/DL (ref 65–99)
HCT VFR BLD AUTO: 40.6 % (ref 37.5–51)
HDLC SERPL-MCNC: 44 MG/DL (ref 40–60)
HGB BLD-MCNC: 14.2 G/DL (ref 13–17.7)
IMM GRANULOCYTES # BLD AUTO: 0.02 10*3/MM3 (ref 0–0.05)
IMM GRANULOCYTES NFR BLD AUTO: 0.3 % (ref 0–0.5)
LDLC SERPL CALC-MCNC: 96 MG/DL (ref 0–100)
LDLC/HDLC SERPL: 2.1 {RATIO}
LYMPHOCYTES # BLD AUTO: 2.49 10*3/MM3 (ref 0.7–3.1)
LYMPHOCYTES NFR BLD AUTO: 37.2 % (ref 19.6–45.3)
MCH RBC QN AUTO: 31.8 PG (ref 26.6–33)
MCHC RBC AUTO-ENTMCNC: 35 G/DL (ref 31.5–35.7)
MCV RBC AUTO: 90.8 FL (ref 79–97)
MONOCYTES # BLD AUTO: 0.72 10*3/MM3 (ref 0.1–0.9)
MONOCYTES NFR BLD AUTO: 10.7 % (ref 5–12)
NEUTROPHILS NFR BLD AUTO: 3.22 10*3/MM3 (ref 1.7–7)
NEUTROPHILS NFR BLD AUTO: 48.1 % (ref 42.7–76)
NRBC BLD AUTO-RTO: 0 /100 WBC (ref 0–0.2)
PLATELET # BLD AUTO: 302 10*3/MM3 (ref 140–450)
PMV BLD AUTO: 9.3 FL (ref 6–12)
POTASSIUM SERPL-SCNC: 4.5 MMOL/L (ref 3.5–5.2)
PROT SERPL-MCNC: 7.2 G/DL (ref 6–8.5)
PSA SERPL-MCNC: 2.17 NG/ML (ref 0–4)
RBC # BLD AUTO: 4.47 10*6/MM3 (ref 4.14–5.8)
SODIUM SERPL-SCNC: 129 MMOL/L (ref 136–145)
T4 FREE SERPL-MCNC: 1.21 NG/DL (ref 0.93–1.7)
TRIGL SERPL-MCNC: 143 MG/DL (ref 0–150)
TSH SERPL DL<=0.05 MIU/L-ACNC: 3.03 UIU/ML (ref 0.27–4.2)
VIT B12 BLD-MCNC: 454 PG/ML (ref 211–946)
VLDLC SERPL-MCNC: 25 MG/DL (ref 5–40)
WBC NRBC COR # BLD: 6.7 10*3/MM3 (ref 3.4–10.8)

## 2023-10-17 PROCEDURE — 82105 ALPHA-FETOPROTEIN SERUM: CPT | Performed by: STUDENT IN AN ORGANIZED HEALTH CARE EDUCATION/TRAINING PROGRAM

## 2023-10-17 PROCEDURE — 80061 LIPID PANEL: CPT

## 2023-10-17 PROCEDURE — 82607 VITAMIN B-12: CPT

## 2023-10-17 PROCEDURE — 80050 GENERAL HEALTH PANEL: CPT

## 2023-10-17 PROCEDURE — 83615 LACTATE (LD) (LDH) ENZYME: CPT | Performed by: STUDENT IN AN ORGANIZED HEALTH CARE EDUCATION/TRAINING PROGRAM

## 2023-10-17 PROCEDURE — 82306 VITAMIN D 25 HYDROXY: CPT

## 2023-10-17 PROCEDURE — G0103 PSA SCREENING: HCPCS

## 2023-10-17 PROCEDURE — 84703 CHORIONIC GONADOTROPIN ASSAY: CPT | Performed by: STUDENT IN AN ORGANIZED HEALTH CARE EDUCATION/TRAINING PROGRAM

## 2023-10-17 PROCEDURE — 84439 ASSAY OF FREE THYROXINE: CPT

## 2023-10-17 PROCEDURE — 82746 ASSAY OF FOLIC ACID SERUM: CPT

## 2023-10-18 ENCOUNTER — OFFICE VISIT (OUTPATIENT)
Dept: SURGERY | Facility: CLINIC | Age: 58
End: 2023-10-18
Payer: COMMERCIAL

## 2023-10-18 VITALS
BODY MASS INDEX: 31.5 KG/M2 | DIASTOLIC BLOOD PRESSURE: 76 MMHG | HEIGHT: 70 IN | HEART RATE: 71 BPM | WEIGHT: 220 LBS | OXYGEN SATURATION: 98 % | SYSTOLIC BLOOD PRESSURE: 130 MMHG

## 2023-10-18 DIAGNOSIS — J98.59 MEDIASTINAL MASS: Primary | ICD-10-CM

## 2023-10-18 NOTE — PROGRESS NOTES
"Chief Complaint  Anterior mediastinal mass    Subjective        Jose Luis Casas presents to [facility] THORACIC SURGERY  History of Present Illness  Mr. Casas is a pleasant 57-year-old gentleman who presents today in follow-up after he underwent a CT/PET scan blood work for incidentally found anterior mediastinal mass.  He denies any current symptoms.  He denies any chest pain, fevers chills and/or cough.  He denies any night sweats.  He denies any change in vision throughout the day.  He denies any weakness and/or ongoing weakness throughout the day.    Objective   Vital Signs:  /76 (BP Location: Left arm, Patient Position: Sitting, Cuff Size: Adult)   Pulse 71   Ht 177.8 cm (70\")   Wt 99.8 kg (220 lb)   SpO2 98%   BMI 31.57 kg/m²   Estimated body mass index is 31.57 kg/m² as calculated from the following:    Height as of this encounter: 177.8 cm (70\").    Weight as of this encounter: 99.8 kg (220 lb).               Physical Exam  Vitals reviewed.   Constitutional:       Appearance: Normal appearance.   Cardiovascular:      Rate and Rhythm: Normal rate and regular rhythm.      Pulses: Normal pulses.      Heart sounds: Normal heart sounds.   Pulmonary:      Effort: Pulmonary effort is normal.      Breath sounds: Normal breath sounds.   Musculoskeletal:         General: Normal range of motion.      Cervical back: Normal range of motion.   Skin:     Capillary Refill: Capillary refill takes less than 2 seconds.   Neurological:      General: No focal deficit present.      Mental Status: He is alert and oriented to person, place, and time.   Psychiatric:         Mood and Affect: Mood normal.         Behavior: Behavior normal.         Thought Content: Thought content normal.         Judgment: Judgment normal.        Result Review :  Beta-hCG, LDH, and alpha-fetoprotein labs were visualized and reviewed all were within normal ranges.  CT/PET scan of the chest was visualized and reviewed by " myself.  The Hounsfield units are consistent with a cystic structure.  No metabolic activity was noted.           Assessment and Plan   Diagnoses and all orders for this visit:    1. Mediastinal mass (Primary)  -     CT Chest Without Contrast; Future    Mr. Casas is a pleasant 57-year-old gentleman who presents today for follow-up after undergoing blood work and CT/PET scan for an incidentally found anterior mediastinal mass.  Based on the CT/PET scan, this appears to be cystic in nature based on Hounsfield units.  All of his anterior mediastinal tumor markers have come back within the normal range.  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  Based on the CT/PET scan it leans more towards a thymic cyst in nature  Mr. Casas and his wife today we discussed 2 options, continue with short interval surveillance CT scan.  Versus surgical excision.  I think both of these options are safe and appropriate at this time point.  They would like to discuss the findings among each other and get back to us with an ultimate decision.  I would lean more towards surgical excision, for diagnosis and exclusion.  I also think a short-term interval CT scan is appropriate.  At this time, they are leaning more towards a interval CT scan in 3 months time.  This will allow them to move back into their house which they are currently out of due to renovations.  I think this is a safe option.  We will see them back in 3 months time with a noncontrasted CT scan of the chest.  They did stated that they wanted to undergo surgical excision sooner, I think that is fine and they will let the office know.       I spent 40 minutes caring for Jose Luis on this date of service. This time includes time spent by me in the following activities:preparing for the visit, reviewing tests, obtaining and/or reviewing a separately obtained history, performing a medically appropriate examination and/or evaluation , counseling and educating the  patient/family/caregiver, ordering medications, tests, or procedures, referring and communicating with other health care professionals , documenting information in the medical record, independently interpreting results and communicating that information with the patient/family/caregiver, and care coordination  Follow Up   No follow-ups on file.  Patient was given instructions and counseling regarding his condition or for health maintenance advice. Please see specific information pulled into the AVS if appropriate.

## 2023-10-25 ENCOUNTER — TELEPHONE (OUTPATIENT)
Dept: INTERNAL MEDICINE | Facility: CLINIC | Age: 58
End: 2023-10-25
Payer: COMMERCIAL

## 2023-10-25 DIAGNOSIS — E87.1 HYPONATREMIA: Primary | ICD-10-CM

## 2023-10-25 RX ORDER — BENAZEPRIL HYDROCHLORIDE 20 MG/1
20 TABLET ORAL DAILY
Qty: 90 TABLET | Refills: 3 | Status: SHIPPED | OUTPATIENT
Start: 2023-10-25

## 2023-10-25 NOTE — TELEPHONE ENCOUNTER
I called patient with his recent labs and low sodium level, and recommendations to recheck this this week or next and to limit fluid intake,, I told him to stop taking the benazepril HCTZ and that we would call in just the benazepril 20 mg by itself and that he is to do another blood test in a week or so and to limit his fluid intake I left him a message on his cell phone #[phone]

## 2023-10-26 ENCOUNTER — TELEPHONE (OUTPATIENT)
Dept: INTERNAL MEDICINE | Facility: CLINIC | Age: 58
End: 2023-10-26
Payer: COMMERCIAL

## 2023-10-26 NOTE — TELEPHONE ENCOUNTER
Call patient and tell him that he can increase the other water pill furosemide to twice a day and then recheck his labs in a week or 2 and see if the sodium levels are better, and to let me know how the edema is doing

## 2023-11-06 ENCOUNTER — LAB (OUTPATIENT)
Dept: LAB | Facility: HOSPITAL | Age: 58
End: 2023-11-06
Payer: COMMERCIAL

## 2023-11-06 DIAGNOSIS — E87.1 HYPONATREMIA: ICD-10-CM

## 2023-11-06 LAB
ANION GAP SERPL CALCULATED.3IONS-SCNC: 8 MMOL/L (ref 5–15)
BUN SERPL-MCNC: 11 MG/DL (ref 6–20)
BUN/CREAT SERPL: 13.4 (ref 7–25)
CALCIUM SPEC-SCNC: 9.8 MG/DL (ref 8.6–10.5)
CHLORIDE SERPL-SCNC: 101 MMOL/L (ref 98–107)
CO2 SERPL-SCNC: 27 MMOL/L (ref 22–29)
CREAT SERPL-MCNC: 0.82 MG/DL (ref 0.76–1.27)
EGFRCR SERPLBLD CKD-EPI 2021: 102.5 ML/MIN/1.73
GLUCOSE SERPL-MCNC: 89 MG/DL (ref 65–99)
POTASSIUM SERPL-SCNC: 4.5 MMOL/L (ref 3.5–5.2)
SODIUM SERPL-SCNC: 136 MMOL/L (ref 136–145)

## 2023-11-06 PROCEDURE — 36415 COLL VENOUS BLD VENIPUNCTURE: CPT

## 2023-11-06 PROCEDURE — 80048 BASIC METABOLIC PNL TOTAL CA: CPT

## 2023-11-14 DIAGNOSIS — K44.9 DIAPHRAGMATIC HERNIA WITHOUT OBSTRUCTION OR GANGRENE: ICD-10-CM

## 2023-11-14 RX ORDER — OMEPRAZOLE 20 MG/1
20 CAPSULE, DELAYED RELEASE ORAL DAILY
Qty: 90 CAPSULE | Refills: 3 | Status: SHIPPED | OUTPATIENT
Start: 2023-11-14

## 2024-01-09 ENCOUNTER — OFFICE VISIT (OUTPATIENT)
Dept: INTERNAL MEDICINE | Facility: CLINIC | Age: 59
End: 2024-01-09
Payer: COMMERCIAL

## 2024-01-09 VITALS
HEIGHT: 70 IN | HEART RATE: 90 BPM | BODY MASS INDEX: 33.36 KG/M2 | SYSTOLIC BLOOD PRESSURE: 161 MMHG | TEMPERATURE: 98.4 F | OXYGEN SATURATION: 98 % | WEIGHT: 233 LBS | DIASTOLIC BLOOD PRESSURE: 94 MMHG

## 2024-01-09 DIAGNOSIS — N40.0 BENIGN PROSTATIC HYPERPLASIA WITHOUT LOWER URINARY TRACT SYMPTOMS: ICD-10-CM

## 2024-01-09 DIAGNOSIS — J30.89 ALLERGIC RHINITIS DUE TO OTHER ALLERGIC TRIGGER, UNSPECIFIED SEASONALITY: ICD-10-CM

## 2024-01-09 DIAGNOSIS — K44.9 DIAPHRAGMATIC HERNIA WITHOUT OBSTRUCTION OR GANGRENE: ICD-10-CM

## 2024-01-09 DIAGNOSIS — E78.00 PURE HYPERCHOLESTEROLEMIA: ICD-10-CM

## 2024-01-09 RX ORDER — FLUTICASONE PROPIONATE 50 MCG
2 SPRAY, SUSPENSION (ML) NASAL DAILY
Qty: 16 G | Refills: 2 | Status: SHIPPED | OUTPATIENT
Start: 2024-01-09

## 2024-01-09 RX ORDER — TAMSULOSIN HYDROCHLORIDE 0.4 MG/1
1 CAPSULE ORAL DAILY
Qty: 90 CAPSULE | Refills: 3 | Status: SHIPPED | OUTPATIENT
Start: 2024-01-09

## 2024-01-09 RX ORDER — FUROSEMIDE 20 MG/1
20 TABLET ORAL DAILY
Qty: 90 TABLET | Refills: 3 | Status: SHIPPED | OUTPATIENT
Start: 2024-01-09 | End: 2025-01-03

## 2024-01-09 RX ORDER — ATORVASTATIN CALCIUM 40 MG/1
40 TABLET, FILM COATED ORAL DAILY
Qty: 90 TABLET | Refills: 3 | Status: SHIPPED | OUTPATIENT
Start: 2024-01-09

## 2024-01-09 RX ORDER — OMEPRAZOLE 20 MG/1
20 CAPSULE, DELAYED RELEASE ORAL DAILY
Qty: 90 CAPSULE | Refills: 3 | Status: SHIPPED | OUTPATIENT
Start: 2024-01-09

## 2024-01-09 RX ORDER — CARVEDILOL PHOSPHATE 40 MG/1
40 CAPSULE, EXTENDED RELEASE ORAL DAILY
Qty: 90 CAPSULE | Refills: 3 | Status: SHIPPED | OUTPATIENT
Start: 2024-01-09

## 2024-01-09 RX ORDER — ASPIRIN 81 MG/1
81 TABLET ORAL DAILY
Qty: 90 TABLET | Refills: 2 | Status: SHIPPED | OUTPATIENT
Start: 2024-01-09

## 2024-01-09 RX ORDER — BENAZEPRIL HYDROCHLORIDE 20 MG/1
20 TABLET ORAL DAILY
Qty: 90 TABLET | Refills: 3 | Status: SHIPPED | OUTPATIENT
Start: 2024-01-09

## 2024-01-09 NOTE — PROGRESS NOTES
"CHIEF COMPLAINT/ HPI:  Hypertension (Routine follow up, med refill. Pt is up to date on vaccine. )    Patient is here to follow-up with his anterior chest mass, blood pressure, says he is still going through a home renovation and has follow-up with pulmonology thoracic surgery in the next couple weeks to discuss possible definitive treatment, he is also going to lymphedema clinic sometime in the next couple weeks to discuss other treatment options for the swelling in his legs which she says is some better, he continues on the current medications he monitors his blood pressure and is can get back to walking soon,    Home blood pressures reviewed, 115/70 129/80 123/79 119/79      Objective   Vital Signs  Vitals:    01/09/24 0958   BP: 161/94   Pulse: 90   Temp: 98.4 °F (36.9 °C)   SpO2: 98%   Weight: 106 kg (233 lb)   Height: 177.8 cm (70\")      Body mass index is 33.43 kg/m².  Review of Systems   Constitutional: Negative.    HENT: Negative.     Eyes: Negative.    Respiratory: Negative.     Cardiovascular: Negative.    Gastrointestinal: Negative.    Endocrine: Negative.    Genitourinary: Negative.    Musculoskeletal: Negative.    Allergic/Immunologic: Negative.    Neurological: Negative.    Hematological: Negative.    Psychiatric/Behavioral: Negative.        Physical Exam  Constitutional:       General: He is not in acute distress.     Appearance: Normal appearance. He is obese.   HENT:      Head: Normocephalic.      Mouth/Throat:      Mouth: Mucous membranes are moist.   Eyes:      Conjunctiva/sclera: Conjunctivae normal.      Pupils: Pupils are equal, round, and reactive to light.   Cardiovascular:      Rate and Rhythm: Normal rate and regular rhythm.      Pulses: Normal pulses.      Heart sounds: Normal heart sounds.   Pulmonary:      Effort: Pulmonary effort is normal.      Breath sounds: Normal breath sounds.   Abdominal:      General: Bowel sounds are normal.      Palpations: Abdomen is soft.   Musculoskeletal:  "        General: No swelling. Normal range of motion.      Cervical back: Neck supple.   Skin:     General: Skin is warm and dry.      Coloration: Skin is not jaundiced.   Neurological:      General: No focal deficit present.      Mental Status: He is alert and oriented to person, place, and time. Mental status is at baseline.   Psychiatric:         Mood and Affect: Mood normal.         Behavior: Behavior normal.         Thought Content: Thought content normal.         Judgment: Judgment normal.        Result Review :   Lab Results   Component Value Date    PROBNP 24.9 05/01/2023     CMP          5/8/2023    11:11 10/17/2023    10:06 11/6/2023    07:34   CMP   Glucose 98  94  89    BUN 10  11  11    Creatinine 0.81  0.87  0.82    EGFR 103.5  101.3  102.5    Sodium 134  129  136    Potassium 4.9  4.5  4.5    Chloride 97  93  101    Calcium 9.8  10.0  9.8    Total Protein 7.6  7.2     Albumin 5.0  4.6     Globulin 2.6  2.6     Total Bilirubin 0.5  0.4     Alkaline Phosphatase 60  59     AST (SGOT) 24  21     ALT (SGPT) 26  27     Albumin/Globulin Ratio 1.9  1.8     BUN/Creatinine Ratio 12.3  12.6  13.4    Anion Gap 8.0  9.0  8.0      CBC w/diff          5/1/2023    11:26 5/8/2023    11:11 10/17/2023    10:06   CBC w/Diff   WBC 7.67  6.97  6.70    RBC 4.61  4.75  4.47    Hemoglobin 14.3  15.1  14.2    Hematocrit 43.1  44.2  40.6    MCV 93.5  93.1  90.8    MCH 31.0  31.8  31.8    MCHC 33.2  34.2  35.0    RDW 12.4  11.8  12.2    Platelets 274  280  302    Neutrophil Rel % 55.9  50.0  48.1    Immature Granulocyte Rel % 0.3  0.3  0.3    Lymphocyte Rel % 30.0  35.7  37.2    Monocyte Rel % 9.9  10.5  10.7    Eosinophil Rel % 3.5  2.9  3.3    Basophil Rel % 0.4  0.6  0.4       Lipid Panel          5/8/2023    11:11 10/17/2023    10:06   Lipid Panel   Total Cholesterol 167  165    Triglycerides 114  143    HDL Cholesterol 47  44    VLDL Cholesterol 21  25    LDL Cholesterol  99  96    LDL/HDL Ratio 2.07  2.10       Lab Results    Component Value Date    TSH 3.030 10/17/2023    TSH 3.060 05/08/2023    TSH 2.840 04/06/2022      Lab Results   Component Value Date    FREET4 1.21 10/17/2023    FREET4 1.0 06/26/2020         PSA          10/17/2023    10:06   PSA   PSA 2.170                     Visit Diagnoses:    ICD-10-CM ICD-9-CM   1. Pure hypercholesterolemia  E78.00 272.0   2. Allergic rhinitis due to other allergic trigger, unspecified seasonality  J30.89 477.8   3. Diaphragmatic hernia without obstruction or gangrene  K44.9 553.3   4. Benign prostatic hyperplasia without lower urinary tract symptoms  N40.0 600.00       Assessment and Plan   Diagnoses and all orders for this visit:    1. Pure hypercholesterolemia  -     atorvastatin (LIPITOR) 40 MG tablet; Take 1 tablet by mouth Daily.  Dispense: 90 tablet; Refill: 3  -     aspirin 81 MG EC tablet; Take 1 tablet by mouth Daily.  Dispense: 90 tablet; Refill: 2  -     benazepril (LOTENSIN) 20 MG tablet; Take 1 tablet by mouth Daily.  Dispense: 90 tablet; Refill: 3  -     carvedilol CR (Coreg CR) 40 MG 24 hr capsule; Take 1 capsule by mouth Daily.  Dispense: 90 capsule; Refill: 3  -     Cholecalciferol 125 MCG (5000 UT) tablet; Take 1 tablet by mouth Daily.  Dispense: 90 each; Refill: 2  -     fluticasone (FLONASE) 50 MCG/ACT nasal spray; 2 sprays into the nostril(s) as directed by provider Daily.  Dispense: 16 g; Refill: 2  -     furosemide (LASIX) 20 MG tablet; Take 1 tablet by mouth Daily for 360 days.  Dispense: 90 tablet; Refill: 3  -     omeprazole (priLOSEC) 20 MG capsule; Take 1 capsule by mouth Daily.  Dispense: 90 capsule; Refill: 3  -     tamsulosin (FLOMAX) 0.4 MG capsule 24 hr capsule; Take 1 capsule by mouth Daily.  Dispense: 90 capsule; Refill: 3  -     Vibegron 75 MG tablet; Take 1 tablet by mouth Daily.  Dispense: 90 tablet; Refill: 2  -     CBC & Differential; Future  -     Comprehensive Metabolic Panel; Future  -     Lipid Panel; Future    2. Allergic rhinitis due to other  allergic trigger, unspecified seasonality  -     atorvastatin (LIPITOR) 40 MG tablet; Take 1 tablet by mouth Daily.  Dispense: 90 tablet; Refill: 3  -     aspirin 81 MG EC tablet; Take 1 tablet by mouth Daily.  Dispense: 90 tablet; Refill: 2  -     benazepril (LOTENSIN) 20 MG tablet; Take 1 tablet by mouth Daily.  Dispense: 90 tablet; Refill: 3  -     carvedilol CR (Coreg CR) 40 MG 24 hr capsule; Take 1 capsule by mouth Daily.  Dispense: 90 capsule; Refill: 3  -     Cholecalciferol 125 MCG (5000 UT) tablet; Take 1 tablet by mouth Daily.  Dispense: 90 each; Refill: 2  -     fluticasone (FLONASE) 50 MCG/ACT nasal spray; 2 sprays into the nostril(s) as directed by provider Daily.  Dispense: 16 g; Refill: 2  -     furosemide (LASIX) 20 MG tablet; Take 1 tablet by mouth Daily for 360 days.  Dispense: 90 tablet; Refill: 3  -     omeprazole (priLOSEC) 20 MG capsule; Take 1 capsule by mouth Daily.  Dispense: 90 capsule; Refill: 3  -     tamsulosin (FLOMAX) 0.4 MG capsule 24 hr capsule; Take 1 capsule by mouth Daily.  Dispense: 90 capsule; Refill: 3  -     Vibegron 75 MG tablet; Take 1 tablet by mouth Daily.  Dispense: 90 tablet; Refill: 2  -     CBC & Differential; Future  -     Comprehensive Metabolic Panel; Future  -     Lipid Panel; Future    3. Diaphragmatic hernia without obstruction or gangrene  -     atorvastatin (LIPITOR) 40 MG tablet; Take 1 tablet by mouth Daily.  Dispense: 90 tablet; Refill: 3  -     aspirin 81 MG EC tablet; Take 1 tablet by mouth Daily.  Dispense: 90 tablet; Refill: 2  -     benazepril (LOTENSIN) 20 MG tablet; Take 1 tablet by mouth Daily.  Dispense: 90 tablet; Refill: 3  -     carvedilol CR (Coreg CR) 40 MG 24 hr capsule; Take 1 capsule by mouth Daily.  Dispense: 90 capsule; Refill: 3  -     Cholecalciferol 125 MCG (5000 UT) tablet; Take 1 tablet by mouth Daily.  Dispense: 90 each; Refill: 2  -     fluticasone (FLONASE) 50 MCG/ACT nasal spray; 2 sprays into the nostril(s) as directed by  provider Daily.  Dispense: 16 g; Refill: 2  -     furosemide (LASIX) 20 MG tablet; Take 1 tablet by mouth Daily for 360 days.  Dispense: 90 tablet; Refill: 3  -     omeprazole (priLOSEC) 20 MG capsule; Take 1 capsule by mouth Daily.  Dispense: 90 capsule; Refill: 3  -     tamsulosin (FLOMAX) 0.4 MG capsule 24 hr capsule; Take 1 capsule by mouth Daily.  Dispense: 90 capsule; Refill: 3  -     Vibegron 75 MG tablet; Take 1 tablet by mouth Daily.  Dispense: 90 tablet; Refill: 2  -     CBC & Differential; Future  -     Comprehensive Metabolic Panel; Future  -     Lipid Panel; Future    4. Benign prostatic hyperplasia without lower urinary tract symptoms  -     atorvastatin (LIPITOR) 40 MG tablet; Take 1 tablet by mouth Daily.  Dispense: 90 tablet; Refill: 3  -     aspirin 81 MG EC tablet; Take 1 tablet by mouth Daily.  Dispense: 90 tablet; Refill: 2  -     benazepril (LOTENSIN) 20 MG tablet; Take 1 tablet by mouth Daily.  Dispense: 90 tablet; Refill: 3  -     carvedilol CR (Coreg CR) 40 MG 24 hr capsule; Take 1 capsule by mouth Daily.  Dispense: 90 capsule; Refill: 3  -     Cholecalciferol 125 MCG (5000 UT) tablet; Take 1 tablet by mouth Daily.  Dispense: 90 each; Refill: 2  -     fluticasone (FLONASE) 50 MCG/ACT nasal spray; 2 sprays into the nostril(s) as directed by provider Daily.  Dispense: 16 g; Refill: 2  -     furosemide (LASIX) 20 MG tablet; Take 1 tablet by mouth Daily for 360 days.  Dispense: 90 tablet; Refill: 3  -     omeprazole (priLOSEC) 20 MG capsule; Take 1 capsule by mouth Daily.  Dispense: 90 capsule; Refill: 3  -     tamsulosin (FLOMAX) 0.4 MG capsule 24 hr capsule; Take 1 capsule by mouth Daily.  Dispense: 90 capsule; Refill: 3  -     Vibegron 75 MG tablet; Take 1 tablet by mouth Daily.  Dispense: 90 tablet; Refill: 2  -     CBC & Differential; Future  -     Comprehensive Metabolic Panel; Future  -     Lipid Panel; Future      Htn------ April 2023, cont lisinopril 20 mg qd, Coreg extended release 40  mg daily, furosemide 20 mg daily,    nuclear medicine stress test, May 31, 2023, -----normal ejection fraction normal perfusion study ----no evidence of ischemia ,  low risk study    Le edema , chronic venous insufficiency---- cont lasix 20 mg qd ----venous insufficiency testing, April 28, 2023 shows no evidence of DVT in either leg,---discussed rx with ace and or compression and diuretics added Lotensin HCTZ to his medical regimen and stopped lisinopril    Overwgt , discussed wgt loss     Bph , cont flomax 0,4 mg qhs, vibegron 75 mg daily    Gerd , cont omeprazole 20 mg qd     Vitamin D deficiency, continues vitamin D replacement 5000 units daily    Hyperlipidemia continues atorvastatin 40 mg daily    Quit tobacco 2017--several cigarettes a day on average, low-dose CT scan for lung cancer screening, --- shows tiny likely benign lung nodules recommended continued annual screening there is biapical scarring without active pulmonary process there is an incidental rounded hypodense lesion in the anterior mediastinal area differential diagnosis includes a thymoma versus a thymic cyst no overly suspicious features to suggest cancer or malignancy and outside CT would be helpful and nonemergent cardiothoracic surgical consultation could be considered, he does have aortic and coronary atherosclerotic disease, -----patient did go see Dr. Harsh Martinez at Dr. 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 --- Patient has a follow-up appointment with Dr. Martinez and a another CT scan of the chest to follow-up before final decision on removal--January 2024    Aspirin usage discussed with patient August 9,  2023,---I will rec asa 81 mg qd for now     Social alcohol,  Follow Up   Return in about 6 months (around 7/9/2024).  Patient was given instructions and counseling regarding his condition or for health maintenance advice. Please see specific information pulled into the AVS if appropriate.         Answers submitted by the patient for this visit:  Primary Reason for Visit (Submitted on 1/8/2024)  What is the primary reason for your visit?: Other  Other (Submitted on 1/8/2024)  Please describe your symptoms.: Follow up visit as requested at my last appointment.  Have you had these symptoms before?: No  How long have you been having these symptoms?: 1-4 days  Please list any medications you are currently taking for this condition.: Follow up visit  Please describe any probable cause for these symptoms. : Follow up visit as requested at my last appointment.

## 2024-01-11 ENCOUNTER — HOSPITAL ENCOUNTER (OUTPATIENT)
Dept: OCCUPATIONAL THERAPY | Facility: HOSPITAL | Age: 59
Setting detail: THERAPIES SERIES
Discharge: HOME | End: 2024-01-11
Payer: COMMERCIAL

## 2024-01-11 DIAGNOSIS — I83.93 VARICOSE VEINS OF BOTH LOWER EXTREMITIES, UNSPECIFIED WHETHER COMPLICATED: ICD-10-CM

## 2024-01-11 DIAGNOSIS — I89.0 LYMPHEDEMA: Primary | ICD-10-CM

## 2024-01-11 DIAGNOSIS — I87.2 CHRONIC VENOUS INSUFFICIENCY: ICD-10-CM

## 2024-01-11 PROCEDURE — 97535 SELF CARE MNGMENT TRAINING: CPT

## 2024-01-11 NOTE — THERAPY RE-EVALUATION
Outpatient Occupational Therapy Lymphedema Re-Evaluation   [place]     Patient Name: Jose Luis Casas  : 1965  MRN: [id number]  Today's Date: 2024      Visit Date: 2024    Patient Active Problem List   Diagnosis    Essential hypertension    Hyperlipidemia    Hiatal hernia    Class 1 obesity due to excess calories without serious comorbidity with body mass index (BMI) of 32.0 to 32.9 in adult    Fatigue    Tinnitus    Esophageal reflux    Edema    Degenerative disc disease, lumbar    Hydrocele    Paresis of single lower extremity    Gynecomastia, male    Benign prostatic hyperplasia without lower urinary tract symptoms    Allergic rhinitis    Venous insufficiency of both lower extremities    Screening PSA (prostate specific antigen)    Mediastinal mass    Coronary artery disease involving native coronary artery of native heart without angina pectoris        Past Medical History:   Diagnosis Date    Colitis     Degenerative disc disease, lumbar 2016    Diverticulosis     Edema, lower extremity     Essential hypertension 2016    GERD (gastroesophageal reflux disease)     Hiatal hernia     High cholesterol 2014    History of COVID-19 2022    Hydrocele     Screening PSA (prostate specific antigen) 10/28/2016    Tinnitus, bilateral     Weakness of left lower extremity 2016        Past Surgical History:   Procedure Laterality Date    COLONOSCOPY  2017    COLONOSCOPY N/A 2021    Procedure: COLONOSCOPY;  Surgeon: Antonette Browning MD;  Location: [facility];  Service: Gastroenterology;  Laterality: N/A;  COLON POLYPS    HYDROCELECTOMY  2015    x2    TONSILLECTOMY           Visit Dx:     ICD-10-CM ICD-9-CM   1. Lymphedema  I89.0 457.1   2. Chronic venous insufficiency  I87.2 459.81   3. 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 No  -SF         Fall Risk Assessment    Any falls in the past year: No  -SF      Does patient have a fear of falling No  -SF         Services    Prior Rehab/Home Health Experiences No  -SF      Are you currently receiving Home Health services No  -SF      Do you plan to receive Home Health services in the near future No  -SF         Daily Activities    Primary Language English  -SF      Are you able to read Yes  -SF      Are you able to write Yes  -SF      How does patient learn best? Listening;Reading;Demonstration;Pictures/Video  -SF         Safety    Are you being hurt, hit, or frightened by anyone at home or in your life? No  -SF      Are you being neglected by a caregiver No  -SF      Have you had any of the following issues with N/A  -SF                User Key  (r) = Recorded By, (t) = Taken By, (c) = Cosigned By      Initials Name Provider Type    SF Maddie Razo OT Occupational Therapist                     Lymphedema       Row Name 01/11/24 0800             Subjective Pain    Able to rate subjective pain? yes  -SF      Pre-Treatment Pain Level 0  -SF      Post-Treatment Pain Level 0  -SF      Subjective Pain Comment Patient denies pain  -SF         Subjective    Subjective Comments Patient reports his house is still undergoing renovations and will be undergoing a surgery in the near future. Patient reports he has been engaging in self-MLD, skin care and HEP and reports improvements. Patient requesting other compression recommendations on this date.  -SF         Lymphedema Assessment    Lymphedema Classification RLE:;LLE:;stage 2 (Spontaneously Irreversible)  -SF    "      LLIS - Physical Concerns    The amount of pain associated with my lymphedema is: 0  -SF      The amount of limb heaviness associated with my lymphedema is: 3  -SF      The amount of skin tightness associated with my lymphedema is: 0  -SF      The size of my swollen limb(s) seems: 2  -SF      Lymphedema affects the movement of my swollen limb(s): 0  -SF      The strength in my swollen limb(s) is: 2  -SF         LLIS - Psychosocial Concerns    Lymphedema affects my body image (i.e., \"how I think I look\"). 4  -SF      Lymphedema affects my socializing with others. 4  -SF      Lymphedema affects my intimate relations with spouse or partner (rate 0 if not applicable 0  -SF      Lymphedema \"gets me down\" (i.e., depression, frustration, or anger) 4  -SF      I must rely on others for help due to my lymphedema. 0  -SF      I know what to do to manage my lymphedema 4  -SF         LLIS - Functional Concerns    Lymphedema affects my ability to perform self-care activities (i.e. eating, dressing, hygiene) 0  -SF      Lymphedema affects my ability to perform routine home or work-related activities. 0  -SF      Lymphedema affects my performance of preferred leisure activities. 4  -SF      Lymphedema affects proper fit of clothing/shoes 3  -SF      Lymphedema affects my sleep 0  -SF         Posture/Observations    Posture- WNL Posture is WNL  -SF         General ROM    GENERAL ROM COMMENTS BLE WFL  -SF         MMT (Manual Muscle Testing)    General MMT Comments BLE WFL  -SF         Lymphedema Edema Assessment    Pitting Edema Moderate  -SF      [place] Sign right:;left:;positive  -SF      [place] Hump right:;left:;negative  -SF         Skin Changes/Observations    Location/Assessment Lower Extremity  -SF      Lower Extremity Conditions bilateral:;normal;intact;clean  -SF      Lower Extremity Color/Pigment bilateral:;erythema  hemosiderin staining  -SF         Lymphedema Measurements    Measurement Type(s) Circumferential  " -SF      Circumferential Areas Lower extremities  -SF         Compression/Skin Care    Compression/Skin Care compression garment  -SF      Compression Garment Comments Therapist providing patient w/ 20-30mmHg compression socks to trial on this date.  -SF         Lymphedema Life Impact Scale Totals    A.  Total Q1 - Q17 (Do not include Q18) 30  -SF      B.  Total number of questions answered (Q1-Q17) 17  -SF      C. Divide A by B 1.76  -SF      D. Multiple C by 25 44  -SF                User Key  (r) = Recorded By, (t) = Taken By, (c) = Cosigned By      Initials Name Provider Type    Maddie Clements OT Occupational Therapist                  Circumferential Measurements:            Therapy Education  Education Details: Therapist providing additional recommendations for compression garments to trial at this point. Therapist reviewing benefits of traditional compression wrapping to address swelling once patient has finished home renovations and future surgery. Therapist also providing recommendation for obtaining different compression garments through insurance and medical supplier if over the counter compression garments do not provide enough compression. Patient was agreeable to plan on this date.  Given: Symptoms/condition management, Edema management, HEP  Program: New, Reinforced  How Provided: Verbal, Demonstration, Written  Provided to: Patient  Level of Understanding: Verbalized  00270 - OT Self Care/Mgmt Minutes: 55         OT Goals       Row Name 01/11/24 1055          Time Calculation    OT Goal Re-Cert Due Date 02/10/24  -               User Key  (r) = Recorded By, (t) = Taken By, (c) = Cosigned By      Initials Name Provider Type    Maddie Clements OT Occupational Therapist                  GOALS:  1. Uncontrolled lymphedema of bilateral lower extremity/lower quadrant.  LTG1:  90 days:  Volumetric measurements of bilateral lower extremity/lower quadrant will be decreased by 4 % or until plateau in  reduction is achieved to improve functional mobility.             STATUS:  Ongoing              STG1a:  30 days:  Volumetric measurements of bilateral lower extremity/lower quadrant will be decreased by 2 % bilaterally to improve functional mobility.    STATUS:  Ongoing  TREATMENT:  Manual Therapy, Therapeutic exercise, ADL/self-care therapy, Bioimpedence Fluid Analysis, Orthotic management and training, Therapeutic Activity, wound dressing as needed, Modalities: TENS, NMES, Ultrasound, Fluidotherapy, , and Patient and family/caregiver education.     2. Patient with decreased ADL/Self-Care routine for lymphedema management.              LTG 2:  90 days:  Patient/caregiver will independently verbalize/demonstrate ADL/Self-Care routine for lymphedema management.                      STATUS:  Ongoing              STG 2a:  30 days:  Patient/caregiver will independently perform or verbally dictate compression wrapping technique of the lower extremity/lower quadrant.                      STATUS:  Ongoing              STG2b:  30 days:  Patient will independently verbalize signs and symptoms of infection.                            STATUS:  Ongoing              STG2c:  30 days:  Patient will independently demonstrate/verbalize proper skin care routine to prevent infection and or wound development.                       STATUS:  Met, Ongoing  STG2d:  30 days:  Patient will independently perform teach back of HEP routine.                      STATUS: Met, Ongoing  STG2e:  30 days:  Patient/caregiver will obtain properly fitting compression orthosis, will demonstrate don/doff skill of compression orthosis with modified independence, and independently verbalize wear schedule.          STATUS: Ongoing   STG2f: 30 days: Patient/caregiver will independently perform self-manual lymphatic drainage of the lower extremity/lower quadrant.          STATUS: Met, Ongoing  TREATMENT:  Therapeutic Activity, Patient/Caregiver Education, ADL  retraining, Manual Therapy, Orthotic Management and training, Modalities: TENS, NMES, Ultrasound, Fluidotherapy Wound dressing if necessary,  Therapeutic Exercise, Modalities                 3. Self-Care Limitations                                    LTG 3: 90 days:  The patient will demonstrate improved quality of life by achieving a score of 30 on the Lymphedema Life Impact Scale.                      STATUS:  Ongoing              STG 3a: 30 days:  The patient will demonstrate improved quality of life by achieving a score of 40 on the Lymphedema Life Impact Scale.                      STATUS:  Ongoing  TREATMENT:  Manual therapy, therapeutic exercise, therapeutic activity, ADL retraining, Patient/caregiver education, Modalities: TENS, NMES, Ultrasound, Fluidotherapy Orthotic Management and Training, Wound dressing as needed.     OT Assessment/Plan       Row Name 01/11/24 1051          OT Assessment    Functional Limitations Limitations in functional capacity and performance;Performance in leisure activities  -SF     Impairments Impaired lymphatic circulation;Impaired venous circulation  -SF     Assessment Comments Patient is a 58 year old male presenting w/ BLE lymphedema. Patient's lymphedema is also accompanied by chronic venous insufficiency. Patient verbalizes good understanding of HEP, skin care recommendations and self MLD. Therapist providing patient w/ compression socks to trial on this date. Patient will continue to benefit from skilled occupational therapy to further evaluate ongoing lymphatic functioning to prevent advanced staging of lymphedema.  -SF     OT Diagnosis Lymphedema, chronic venous insufficiency  -SF     OT Rehab Potential Good  -SF     Patient/caregiver participated in establishment of treatment plan and goals Yes  -SF     Patient would benefit from skilled therapy intervention Yes  -SF        OT Plan    OT Frequency Other (comment)  -SF     Predicted Duration of Therapy Intervention (OT)  3 month follow up  -SF     Planned CPT's? OT EVAL LOW COMPLEXITY: 64924;OT EVAL MOD COMPLEXITY: 25394;OT EVAL HIGH COMPLEXITY: 16588;OT RE-EVAL: 09074;OT THER ACT EA 15 MIN: 57226NS;OT THER PROC EA 15 MIN: 71019XQ;OT SELF CARE/MGMT/TRAIN 15 MIN: 56023;OT ORTHOTIC MGMT/TRAIN EA 15 MIN: 41364;OT ORTHO/PROSTHET CHECKOUT EA 15 MIN: 76116;OT CARE PLAN EA 15 MIN;OT MANUAL THERAPY EA 15 MIN: 61916  -SF     Planned Therapy Interventions (Optional Details) home exercise program;manual therapy techniques;orthotic fitting/training;patient/family education;prosthetic fitting/training;ROM (Range of Motion);strengthening;stretching  -SF     OT Plan Comments Continue POC  -SF               User Key  (r) = Recorded By, (t) = Taken By, (c) = Cosigned By      Initials Name Provider Type    SF Maddie Razo OT Occupational Therapist                              Time Calculation:   Timed Charges  99285 - OT Self Care/Mgmt Minutes: 55  Total Minutes  Timed Charges Total Minutes: 55   Total Minutes: 55     Therapy Charges for Today       Code Description Service Date Service Provider Modifiers Qty    96360899315 HC OT SELF CARE/MGMT/TRAIN EA 15 MIN 1/11/2024 Maddie Razo OT GO 4                      Maddie Razo OT  1/11/2024

## 2024-01-18 ENCOUNTER — HOSPITAL ENCOUNTER (OUTPATIENT)
Dept: CT IMAGING | Facility: HOSPITAL | Age: 59
Discharge: HOME | End: 2024-01-18
Admitting: STUDENT IN AN ORGANIZED HEALTH CARE EDUCATION/TRAINING PROGRAM
Payer: COMMERCIAL

## 2024-01-18 DIAGNOSIS — J98.59 MEDIASTINAL MASS: ICD-10-CM

## 2024-01-18 PROCEDURE — 71250 CT THORAX DX C-: CPT

## 2024-01-18 NOTE — TELEPHONE ENCOUNTER
PA STARTED: 5/17/2022    COVER MY MEDS KEY: [id number]    WAITING ON INSURANCE REPLY   Patient notified via [employer].

## 2024-01-24 ENCOUNTER — OFFICE VISIT (OUTPATIENT)
Dept: SURGERY | Facility: CLINIC | Age: 59
End: 2024-01-24
Payer: COMMERCIAL

## 2024-01-24 VITALS
OXYGEN SATURATION: 97 % | HEART RATE: 65 BPM | HEIGHT: 70 IN | WEIGHT: 233 LBS | BODY MASS INDEX: 33.36 KG/M2 | SYSTOLIC BLOOD PRESSURE: 132 MMHG | DIASTOLIC BLOOD PRESSURE: 86 MMHG

## 2024-01-24 DIAGNOSIS — J98.59 MEDIASTINAL MASS: Primary | ICD-10-CM

## 2024-01-24 PROCEDURE — 99214 OFFICE O/P EST MOD 30 MIN: CPT | Performed by: STUDENT IN AN ORGANIZED HEALTH CARE EDUCATION/TRAINING PROGRAM

## 2024-01-24 NOTE — PROGRESS NOTES
"Chief Complaint  Anterior mediastinal mass versus cyst    Subjective        Jose Luis Casas presents to [facility] THORACIC SURGERY  History of Present Illness  Mr. Casas is a pleasant 58-year-old gentleman who presents today for continued evaluation for an incidentally found anterior mediastinal mass.  He presents today for an interval noncontrasted CT scan of the chest.  Previously we underwent a CT/PET scan where this lesion was photopenic and consistent with a cyst based on his Hounsfield units.  He denies any symptoms.  He denies any fevers, chills and or cough.  He denies any myasthenia symptoms,  progressive muscular weakness and diplopia as the day progresses.  Objective   Vital Signs:  /86 (BP Location: Left arm, Patient Position: Sitting, Cuff Size: Adult)   Pulse 65   Ht 177.8 cm (70\")   Wt 106 kg (233 lb)   SpO2 97%   BMI 33.43 kg/m²   Estimated body mass index is 33.43 kg/m² as calculated from the following:    Height as of this encounter: 177.8 cm (70\").    Weight as of this encounter: 106 kg (233 lb).               Physical Exam  Constitutional:       Appearance: Normal appearance.   Cardiovascular:      Rate and Rhythm: Normal rate and regular rhythm.      Pulses: Normal pulses.      Heart sounds: Normal heart sounds.   Pulmonary:      Effort: Pulmonary effort is normal.      Breath sounds: Normal breath sounds.   Neurological:      Mental Status: He is alert.        Result Review :  CT scan of the chest performed on 1/18/2024 was visualized and reviewed by myself.  There is anterior mediastinal mass is similar in size and appearance.  It appears like a cyst.             Assessment and Plan     Diagnoses and all orders for this visit:    1. Mediastinal mass (Primary)  -     CT Chest Without Contrast Diagnostic; Future    Mr. Casas is a pleasant 58-year-old gentleman who presents with an incidentally found anterior mediastinal mass.  Based on imaging and this appears to be " cystic in nature.  It has been stable over the last 6 months.  I think it is safe for continued surveillance of this lesion.  We will order a noncontrasted CT scan in 6 months time and see him back in clinic then.       I spent 30 minutes caring for Jose Luis on this date of service. This time includes time spent by me in the following activities:preparing for the visit, reviewing tests, obtaining and/or reviewing a separately obtained history, performing a medically appropriate examination and/or evaluation , counseling and educating the patient/family/caregiver, ordering medications, tests, or procedures, referring and communicating with other health care professionals , documenting information in the medical record, independently interpreting results and communicating that information with the patient/family/caregiver, and care coordination  Follow Up     No follow-ups on file.  Patient was given instructions and counseling regarding his condition or for health maintenance advice. Please see specific information pulled into the AVS if appropriate.

## 2024-03-28 ENCOUNTER — HOSPITAL ENCOUNTER (OUTPATIENT)
Dept: OCCUPATIONAL THERAPY | Facility: HOSPITAL | Age: 59
Setting detail: THERAPIES SERIES
Discharge: HOME | End: 2024-03-28
Payer: COMMERCIAL

## 2024-03-28 DIAGNOSIS — I87.2 CHRONIC VENOUS INSUFFICIENCY: ICD-10-CM

## 2024-03-28 DIAGNOSIS — I89.0 LYMPHEDEMA: Primary | ICD-10-CM

## 2024-03-28 DIAGNOSIS — I83.93 VARICOSE VEINS OF BOTH LOWER EXTREMITIES, UNSPECIFIED WHETHER COMPLICATED: ICD-10-CM

## 2024-03-28 PROCEDURE — 97535 SELF CARE MNGMENT TRAINING: CPT

## 2024-03-28 NOTE — THERAPY RE-EVALUATION
Outpatient Occupational Therapy Lymphedema Re-Evaluation   Nanette     Patient Name: Jose Luis Casas  : 1965  MRN: [id number]  Today's Date: 3/28/2024      Visit Date: 2024    Patient Active Problem List   Diagnosis    Essential hypertension    Hyperlipidemia    Hiatal hernia    Class 1 obesity due to excess calories without serious comorbidity with body mass index (BMI) of 32.0 to 32.9 in adult    Fatigue    Tinnitus    Esophageal reflux    Edema    Degenerative disc disease, lumbar    Hydrocele    Paresis of single lower extremity    Gynecomastia, male    Benign prostatic hyperplasia without lower urinary tract symptoms    Allergic rhinitis    Venous insufficiency of both lower extremities    Screening PSA (prostate specific antigen)    Mediastinal mass    Coronary artery disease involving native coronary artery of native heart without angina pectoris        Past Medical History:   Diagnosis Date    Colitis     Degenerative disc disease, lumbar 2016    Diverticulosis     Edema, lower extremity     Essential hypertension 2016    GERD (gastroesophageal reflux disease)     Hiatal hernia     High cholesterol 2014    History of COVID-19 2022    Hydrocele     Screening PSA (prostate specific antigen) 10/28/2016    Tinnitus, bilateral     Weakness of left lower extremity 2016        Past Surgical History:   Procedure Laterality Date    COLONOSCOPY  2017    COLONOSCOPY N/A 2021    Procedure: COLONOSCOPY;  Surgeon: Antonette Browning MD;  Location: [facility] ENDOSCOPY;  Service: Gastroenterology;  Laterality: N/A;  COLON POLYPS    HYDROCELECTOMY  2015    x2    TONSILLECTOMY           Visit Dx:     ICD-10-CM ICD-9-CM   1. Lymphedema  I89.0 457.1   2. Chronic venous insufficiency  I87.2 459.81   3. Varicose veins of both lower extremities, unspecified whether complicated  I83.93 454.9        Patient History       Row Name 24 0900             History    Chief  Complaint Pain;Swelling  -SF      Type of Pain Lower Extremity / Leg  -SF      Patient/Caregiver Goals Relieve pain;Decrease swelling;Know what to do to help the symptoms  -SF      Hand Dominance right-handed  -SF      Occupation/sports/leisure activities [employer] project [employer] /Graphic [employer]  -SF      Patient seeing anyone else for problem(s)? No  -SF         Fall Risk Assessment    Any falls in the past year: No  -SF      Does patient have a fear of falling No  -SF         Services    Prior Rehab/Home Health Experiences No  -SF      Are you currently receiving Home Health services No  -SF      Do you plan to receive Home Health services in the near future No  -SF         Daily Activities    Primary Language English  -SF      Are you able to read Yes  -SF      Are you able to write Yes  -SF      How does patient learn best? Listening;Reading;Demonstration;Pictures/Video  -SF      Pt Participated in POC and Goals Yes  -SF         Safety    Are you being hurt, hit, or frightened by anyone at home or in your life? No  -SF      Are you being neglected by a caregiver No  -SF      Have you had any of the following issues with N/A  -SF                User Key  (r) = Recorded By, (t) = Taken By, (c) = Cosigned By      Initials Name Provider Type    SF Maddie Razo OT Occupational Therapist                     Lymphedema       Row Name 03/28/24 0900             Subjective Pain    Able to rate subjective pain? yes  -SF      Pre-Treatment Pain Level 0  -SF      Post-Treatment Pain Level 0  -SF         Subjective    Subjective Comments Patient reports he has been wearing 15-20mmHg compression daily but has not trialed the 20-30mmHg compression yet. Patient reports he will be going on vacation in April and is wanting to try compression wrapping once he returns. Patient reports swelling is more significant at the end of the day when he has been on his feet.  -SF         Lymphedema Assessment    Lymphedema Classification  "RLE:;LLE:;stage 2 (Spontaneously Irreversible)  -SF         LLIS - Physical Concerns    The amount of pain associated with my lymphedema is: 0  -SF      The amount of limb heaviness associated with my lymphedema is: 3  -SF      The amount of skin tightness associated with my lymphedema is: 1  -SF      The size of my swollen limb(s) seems: 2  -SF      Lymphedema affects the movement of my swollen limb(s): 0  -SF      The strength in my swollen limb(s) is: 2  -SF         LLIS - Psychosocial Concerns    Lymphedema affects my body image (i.e., \"how I think I look\"). 4  -SF      Lymphedema affects my socializing with others. 4  -SF      Lymphedema affects my intimate relations with spouse or partner (rate 0 if not applicable 0  -SF      Lymphedema \"gets me down\" (i.e., depression, frustration, or anger) 4  -SF      I must rely on others for help due to my lymphedema. 0  -SF      I know what to do to manage my lymphedema 4  -SF         LLIS - Functional Concerns    Lymphedema affects my ability to perform self-care activities (i.e. eating, dressing, hygiene) 0  -SF      Lymphedema affects my ability to perform routine home or work-related activities. 0  -SF      Lymphedema affects my performance of preferred leisure activities. 4  -SF      Lymphedema affects proper fit of clothing/shoes 3  -SF      Lymphedema affects my sleep 0  -SF         Posture/Observations    Posture- WNL Posture is WNL  -SF         General ROM    GENERAL ROM COMMENTS BLE WFL  -SF         MMT (Manual Muscle Testing)    General MMT Comments BLE WFL  -SF         Lymphedema Edema Assessment    Pitting Edema Moderate  -SF      [place]em[place] Sign right:;left:;positive  -SF      [place] Hump right:;left:;negative  -SF         Skin Changes/Observations    Location/Assessment Lower Extremity  -SF      Lower Extremity Conditions bilateral:;normal;intact;clean  -SF      Lower Extremity Color/Pigment bilateral:;erythema;brawny  varicose veins  -SF         Lymphedema " Measurements    Measurement Type(s) Circumferential  -SF      Circumferential Areas Lower extremities  -SF         Lymphedema Life Impact Scale Totals    A.  Total Q1 - Q17 (Do not include Q18) 31  -SF      B.  Total number of questions answered (Q1-Q17) 17  -SF      C. Divide A by B 1.82  -SF      D. Multiple C by 25 45.5  -SF                User Key  (r) = Recorded By, (t) = Taken By, (c) = Cosigned By      Initials Name Provider Type    Maddie Clements OT Occupational Therapist                  Circumferential Measurements:                 Therapy Education  Education Details: Therapist reviewing recommendations for compression, elevation and exercise.  Given: Symptoms/condition management, Edema management, HEP  Program: Reinforced  How Provided: Verbal  Provided to: Patient  Level of Understanding: Verbalized  19429 - OT Self Care/Mgmt Minutes: 30         OT Goals       Row Name 03/28/24 1220          Time Calculation    OT Goal Re-Cert Due Date 04/27/24  -               User Key  (r) = Recorded By, (t) = Taken By, (c) = Cosigned By      Initials Name Provider Type    Maddie Clements OT Occupational Therapist                  GOALS:  1. Uncontrolled lymphedema of bilateral lower extremity/lower quadrant.  LTG1:  90 days:  Volumetric measurements of bilateral lower extremity/lower quadrant will be decreased by 4 % or until plateau in reduction is achieved to improve functional mobility.             STATUS:  Ongoing              STG1a:  30 days:  Volumetric measurements of bilateral lower extremity/lower quadrant will be decreased by 2 % bilaterally to improve functional mobility.    STATUS:  Ongoing  TREATMENT:  Manual Therapy, Therapeutic exercise, ADL/self-care therapy, Bioimpedence Fluid Analysis, Orthotic management and training, Therapeutic Activity, wound dressing as needed, Modalities: TENS, NMES, Ultrasound, Fluidotherapy, , and Patient and family/caregiver education.     2. Patient with decreased  ADL/Self-Care routine for lymphedema management.              LTG 2:  90 days:  Patient/caregiver will independently verbalize/demonstrate ADL/Self-Care routine for lymphedema management.                      STATUS:  Ongoing              STG 2a:  30 days:  Patient/caregiver will independently perform or verbally dictate compression wrapping technique of the lower extremity/lower quadrant.                      STATUS:  Ongoing              STG2b:  30 days:  Patient will independently verbalize signs and symptoms of infection.                            STATUS:  Ongoing              STG2c:  30 days:  Patient will independently demonstrate/verbalize proper skin care routine to prevent infection and or wound development.                       STATUS:  Met, Ongoing  STG2d:  30 days:  Patient will independently perform teach back of HEP routine.                      STATUS: Met, Ongoing  STG2e:  30 days:  Patient/caregiver will obtain properly fitting compression orthosis, will demonstrate don/doff skill of compression orthosis with modified independence, and independently verbalize wear schedule.          STATUS: Ongoing   STG2f: 30 days: Patient/caregiver will independently perform self-manual lymphatic drainage of the lower extremity/lower quadrant.          STATUS: Met, Ongoing  TREATMENT:  Therapeutic Activity, Patient/Caregiver Education, ADL retraining, Manual Therapy, Orthotic Management and training, Modalities: TENS, NMES, Ultrasound, Fluidotherapy Wound dressing if necessary,  Therapeutic Exercise, Modalities                 3. Self-Care Limitations                                    LTG 3: 90 days:  The patient will demonstrate improved quality of life by achieving a score of 30 on the Lymphedema Life Impact Scale.                      STATUS:  Ongoing              STG 3a: 30 days:  The patient will demonstrate improved quality of life by achieving a score of 40 on the Lymphedema Life Impact Scale.                       STATUS:  Ongoing  TREATMENT:  Manual therapy, therapeutic exercise, therapeutic activity, ADL retraining, Patient/caregiver education, Modalities: TENS, NMES, Ultrasound, Fluidotherapy Orthotic Management and Training, Wound dressing as needed.      OT Assessment/Plan       Row Name 03/28/24 1148          OT Assessment    Functional Limitations Limitations in functional capacity and performance;Performance in leisure activities  -SF     Impairments Impaired lymphatic circulation;Impaired venous circulation  -SF     Assessment Comments Patient is a 58-year-old male presenting with bilateral lower extremity lymphedema.  Patient's lymphedema is also accompanied by chronic venous insufficiency.  Patient verbalizes compliance with wearing compression garments daily and will be trialing 20 to 30 mmHg compression garments.  Patient interested in engaging in compression wrapping at next re-eval once he returns from vacation.  Patient will continue to benefit from skilled occupational therapy to further evaluate ongoing lymphatic functioning to prevent advancement in lymphedema staging.  -SF     OT Diagnosis Lymphedema, chronic venous insufficiency  -SF     OT Rehab Potential Good  -SF     Patient/caregiver participated in establishment of treatment plan and goals Yes  -SF     Patient would benefit from skilled therapy intervention Yes  -SF        OT Plan    OT Frequency Other (comment)  -SF     Predicted Duration of Therapy Intervention (OT) Patient will be reevaluated in 2 months  -SF     Planned CPT's? OT EVAL LOW COMPLEXITY: 24992;OT EVAL MOD COMPLEXITY: 49091;OT EVAL HIGH COMPLEXITY: 02822;OT RE-EVAL: 50396;OT THER ACT EA 15 MIN: [id number];OT THER PROC EA 15 MIN: [id number];OT SELF CARE/MGMT/TRAIN 15 MIN: 77976;OT ORTHOTIC MGMT/TRAIN EA 15 MIN: 07113;OT ORTHO/PROSTHET CHECKOUT EA 15 MIN: 66891;OT CARE PLAN EA 15 MIN;OT MANUAL THERAPY EA 15 MIN: 55296  -SF     Planned Therapy Interventions (Optional Details) home  exercise program;manual therapy techniques;orthotic fitting/training;patient/family education;prosthetic fitting/training;ROM (Range of Motion);strengthening;stretching  -SF     OT Plan Comments Continue POC  -SF               User Key  (r) = Recorded By, (t) = Taken By, (c) = Cosigned By      Initials Name Provider Type    SF Maddie Razo OT Occupational Therapist                              Time Calculation:   Timed Charges  25663 - OT Self Care/Mgmt Minutes: 30  Total Minutes  Timed Charges Total Minutes: 30   Total Minutes: 30     Therapy Charges for Today       Code Description Service Date Service Provider Modifiers Qty    [id number]  OT SELF CARE/MGMT/TRAIN EA 15 MIN 3/28/2024 Maddie Razo OT GO 2                      Maddie Razo OT  3/28/2024

## 2024-05-13 ENCOUNTER — HOSPITAL ENCOUNTER (OUTPATIENT)
Dept: OCCUPATIONAL THERAPY | Facility: HOSPITAL | Age: 59
Setting detail: THERAPIES SERIES
Discharge: HOME | End: 2024-05-13
Payer: COMMERCIAL

## 2024-05-13 DIAGNOSIS — I87.2 CHRONIC VENOUS INSUFFICIENCY: ICD-10-CM

## 2024-05-13 DIAGNOSIS — I89.0 LYMPHEDEMA: Primary | ICD-10-CM

## 2024-05-13 DIAGNOSIS — I83.93 VARICOSE VEINS OF BOTH LOWER EXTREMITIES, UNSPECIFIED WHETHER COMPLICATED: ICD-10-CM

## 2024-05-13 PROCEDURE — 97535 SELF CARE MNGMENT TRAINING: CPT

## 2024-05-13 NOTE — THERAPY RE-EVALUATION
Outpatient Occupational Therapy Lymphedema Re-Evaluation   Nanette     Patient Name: Jose Luis Casas  : 1965  MRN: [id number]  Today's Date: 2024      Visit Date: 2024    Patient Active Problem List   Diagnosis    Essential hypertension    Hyperlipidemia    Hiatal hernia    Class 1 obesity due to excess calories without serious comorbidity with body mass index (BMI) of 32.0 to 32.9 in adult    Fatigue    Tinnitus    Esophageal reflux    Edema    Degenerative disc disease, lumbar    Hydrocele    Paresis of single lower extremity    Gynecomastia, male    Benign prostatic hyperplasia without lower urinary tract symptoms    Allergic rhinitis    Venous insufficiency of both lower extremities    Screening PSA (prostate specific antigen)    Mediastinal mass    Coronary artery disease involving native coronary artery of native heart without angina pectoris        Past Medical History:   Diagnosis Date    Colitis     Degenerative disc disease, lumbar 2016    Diverticulosis     Edema, lower extremity     Essential hypertension 2016    GERD (gastroesophageal reflux disease)     Hiatal hernia     High cholesterol 2014    History of COVID-19 2022    Hydrocele     Screening PSA (prostate specific antigen) 10/28/2016    Tinnitus, bilateral     Weakness of left lower extremity 2016        Past Surgical History:   Procedure Laterality Date    COLONOSCOPY  2017    COLONOSCOPY N/A 2021    Procedure: COLONOSCOPY;  Surgeon: Antonette Browning MD;  Location: [facility] ENDOSCOPY;  Service: Gastroenterology;  Laterality: N/A;  COLON POLYPS    HYDROCELECTOMY  2015    x2    TONSILLECTOMY           Visit Dx:     ICD-10-CM ICD-9-CM   1. Lymphedema  I89.0 457.1   2. Chronic venous insufficiency  I87.2 459.81   3. Varicose veins of both lower extremities, unspecified whether complicated  I83.93 454.9            Lymphedema       Row Name 24 0900             Subjective Pain    Able  "to rate subjective pain? yes  -SF      Pre-Treatment Pain Level 0  -SF      Post-Treatment Pain Level 0  -SF         Subjective    Subjective Comments Patient reports he noticed increase in swelling. Patient reports he is eager to begin treatment at this time.  -SF         Lymphedema Assessment    Lymphedema Classification RLE:;LLE:;stage 2 (Spontaneously Irreversible)  -SF         LLIS - Physical Concerns    The amount of pain associated with my lymphedema is: 0  -SF      The amount of limb heaviness associated with my lymphedema is: 3  -SF      The amount of skin tightness associated with my lymphedema is: 1  -SF      The size of my swollen limb(s) seems: 2  -SF      Lymphedema affects the movement of my swollen limb(s): 0  -SF      The strength in my swollen limb(s) is: 2  -SF         LLIS - Psychosocial Concerns    Lymphedema affects my body image (i.e., \"how I think I look\"). 4  -SF      Lymphedema affects my socializing with others. 4  -SF      Lymphedema affects my intimate relations with spouse or partner (rate 0 if not applicable 0  -SF      Lymphedema \"gets me down\" (i.e., depression, frustration, or anger) 4  -SF      I must rely on others for help due to my lymphedema. 0  -SF      I know what to do to manage my lymphedema 3  -SF         LLIS - Functional Concerns    Lymphedema affects my ability to perform self-care activities (i.e. eating, dressing, hygiene) 0  -SF      Lymphedema affects my ability to perform routine home or work-related activities. 0  -SF      Lymphedema affects my performance of preferred leisure activities. 4  -SF      Lymphedema affects proper fit of clothing/shoes 3  -SF      Lymphedema affects my sleep 0  -SF         Posture/Observations    Posture- WNL Posture is WNL  -SF         General ROM    GENERAL ROM COMMENTS BLE WFL  -SF         MMT (Manual Muscle Testing)    General MMT Comments BLE WFL  -SF         Lymphedema Edema Assessment    Pitting Edema Moderate  -SF      [place]emmer " Sign right:;left:;positive  -SF      [place] Hump right:;left:;negative  -SF         Skin Changes/Observations    Location/Assessment Lower Extremity  -SF      Lower Extremity Conditions bilateral:;normal;intact;clean  -SF      Lower Extremity Color/Pigment bilateral:;erythema;brawny  varicose veins  -SF         Lymphedema Life Impact Scale Totals    A.  Total Q1 - Q17 (Do not include Q18) 30  -SF      B.  Total number of questions answered (Q1-Q17) 17  -SF      C. Divide A by B 1.76  -SF      D. Multiple C by 25 44  -SF                User Key  (r) = Recorded By, (t) = Taken By, (c) = Cosigned By      Initials Name Provider Type    Maddie Clements OT Occupational Therapist                  Circumferential Measurements:               Therapy Education  Education Details: Therapist reviewing with pt complete decongestive therapy and the process of OT therapy services. Therapist explaining to pt that once pt has reached a plateau in lymph volume reduction therapist work with a separate company to obtain permanent garments. Therapist discussing with pt regarding therapist requesting benefit checks from said company for garment coverage. Therapist recommending pt for at least 24 hours and don compression wrapping longer if possible. Therapist explaining to pt the longer he remains in compression wrapping the quicker pt can reach plateau and work towards obtaining permanent garments. Therapist explaining to bring compression back and will be reused. Pt will end up with a second set for wash and wear.  Given: Symptoms/condition management, Edema management  Program: Reinforced  How Provided: Verbal  Provided to: Patient  Level of Understanding: Verbalized  68896 - OT Self Care/Mgmt Minutes: 30         OT Goals       Row Name 05/13/24 1028          Time Calculation    OT Goal Re-Cert Due Date 06/12/24  -               User Key  (r) = Recorded By, (t) = Taken By, (c) = Cosigned By      Initials Name Provider Type    SF  Maddie Razo, OT Occupational Therapist                  GOALS:  1. Uncontrolled lymphedema of bilateral lower extremity/lower quadrant.  LTG1:  90 days:  Volumetric measurements of bilateral lower extremity/lower quadrant will be decreased by 4 % or until plateau in reduction is achieved to improve functional mobility.             STATUS:  Ongoing              STG1a:  30 days:  Volumetric measurements of bilateral lower extremity/lower quadrant will be decreased by 2 % bilaterally to improve functional mobility.    STATUS:  Ongoing  TREATMENT:  Manual Therapy, Therapeutic exercise, ADL/self-care therapy, Bioimpedence Fluid Analysis, Orthotic management and training, Therapeutic Activity, wound dressing as needed, Modalities: TENS, NMES, Ultrasound, Fluidotherapy, , and Patient and family/caregiver education.     2. Patient with decreased ADL/Self-Care routine for lymphedema management.              LTG 2:  90 days:  Patient/caregiver will independently verbalize/demonstrate ADL/Self-Care routine for lymphedema management.                      STATUS:  Ongoing              STG 2a:  30 days:  Patient/caregiver will independently perform or verbally dictate compression wrapping technique of the lower extremity/lower quadrant.                      STATUS:  Ongoing              STG2b:  30 days:  Patient will independently verbalize signs and symptoms of infection.                            STATUS:  Ongoing              STG2c:  30 days:  Patient will independently demonstrate/verbalize proper skin care routine to prevent infection and or wound development.                       STATUS:  Met, Ongoing  STG2d:  30 days:  Patient will independently perform teach back of HEP routine.                      STATUS: Met, Ongoing  STG2e:  30 days:  Patient/caregiver will obtain properly fitting compression orthosis, will demonstrate don/doff skill of compression orthosis with modified independence, and independently verbalize wear  schedule.          STATUS: Ongoing   STG2f: 30 days: Patient/caregiver will independently perform self-manual lymphatic drainage of the lower extremity/lower quadrant.          STATUS: Met, Ongoing  TREATMENT:  Therapeutic Activity, Patient/Caregiver Education, ADL retraining, Manual Therapy, Orthotic Management and training, Modalities: TENS, NMES, Ultrasound, Fluidotherapy Wound dressing if necessary,  Therapeutic Exercise, Modalities                 3. Self-Care Limitations                                    LTG 3: 90 days:  The patient will demonstrate improved quality of life by achieving a score of 30 on the Lymphedema Life Impact Scale.                      STATUS:  Ongoing              STG 3a: 30 days:  The patient will demonstrate improved quality of life by achieving a score of 40 on the Lymphedema Life Impact Scale.                      STATUS:  Ongoing  TREATMENT:  Manual therapy, therapeutic exercise, therapeutic activity, ADL retraining, Patient/caregiver education, Modalities: TENS, NMES, Ultrasound, Fluidotherapy Orthotic Management and Training, Wound dressing as needed.     OT Assessment/Plan       Row Name 05/13/24 1026          OT Assessment    Functional Limitations Limitations in functional capacity and performance;Performance in leisure activities  -SF     Impairments Impaired lymphatic circulation;Impaired venous circulation  -SF     Assessment Comments Eddie is a 58-year-old male presenting with bilateral lower extremity lymphedema.  His lymphedema is also accompanied by chronic venous insufficiency.  Patient is eager to begin treatment at this time as his swelling has progressed with utilizing 20 to 30 mmHg compression garments. Patient agreeable to 5 days a week treatment to expedite reduction in BLE lymphedema. Patient will benefit from skilled occupational therapy to facilitate a reduction of bilateral lower extremity lymphedema until a plateau is achieved, patient has obtained permanent  compression garments and is independent with complete decongestive therapy.  -SF     OT Diagnosis Lymphedema, chronic venous insufficiency  -SF     OT Rehab Potential Good  -SF     Patient/caregiver participated in establishment of treatment plan and goals Yes  -SF     Patient would benefit from skilled therapy intervention Yes  -SF        OT Plan    OT Frequency 4x/week;5x/week  -SF     Predicted Duration of Therapy Intervention (OT) 4 weeks  -SF     Planned CPT's? OT EVAL LOW COMPLEXITY: 53192;OT EVAL MOD COMPLEXITY: 19548;OT EVAL HIGH COMPLEXITY: 84472;OT RE-EVAL: 91432;OT THER ACT EA 15 MIN: 95087FW;OT THER PROC EA 15 MIN: 24269LM;OT SELF CARE/MGMT/TRAIN 15 MIN: 14057;OT ORTHOTIC MGMT/TRAIN EA 15 MIN: 50342;OT ORTHO/PROSTHET CHECKOUT EA 15 MIN: 66386;OT CARE PLAN EA 15 MIN;OT MANUAL THERAPY EA 15 MIN: 83254  -SF     Planned Therapy Interventions (Optional Details) home exercise program;manual therapy techniques;orthotic fitting/training;patient/family education;prosthetic fitting/training;ROM (Range of Motion);strengthening;stretching  -SF     OT Plan Comments Continue POC  -SF               User Key  (r) = Recorded By, (t) = Taken By, (c) = Cosigned By      Initials Name Provider Type    SF Maddie Razo OT Occupational Therapist                              Time Calculation:   Timed Charges  53574 - OT Self Care/Mgmt Minutes: 30  Total Minutes  Timed Charges Total Minutes: 30   Total Minutes: 30     Therapy Charges for Today       Code Description Service Date Service Provider Modifiers Qty    05100107612 HC OT SELF CARE/MGMT/TRAIN EA 15 MIN 5/13/2024 Maddie Razo OT GO 2                      Maddie Razo OT  5/13/2024

## 2024-06-03 DIAGNOSIS — N40.0 BENIGN PROSTATIC HYPERPLASIA WITHOUT LOWER URINARY TRACT SYMPTOMS: ICD-10-CM

## 2024-06-03 DIAGNOSIS — J30.89 ALLERGIC RHINITIS DUE TO OTHER ALLERGIC TRIGGER, UNSPECIFIED SEASONALITY: ICD-10-CM

## 2024-06-03 DIAGNOSIS — K44.9 DIAPHRAGMATIC HERNIA WITHOUT OBSTRUCTION OR GANGRENE: ICD-10-CM

## 2024-06-03 DIAGNOSIS — E78.00 PURE HYPERCHOLESTEROLEMIA: ICD-10-CM

## 2024-06-03 RX ORDER — FLUTICASONE PROPIONATE 50 MCG
2 SPRAY, SUSPENSION (ML) NASAL DAILY
Qty: 16 G | Refills: 2 | Status: SHIPPED | OUTPATIENT
Start: 2024-06-03

## 2024-06-04 ENCOUNTER — HOSPITAL ENCOUNTER (OUTPATIENT)
Dept: OCCUPATIONAL THERAPY | Facility: HOSPITAL | Age: 59
Setting detail: THERAPIES SERIES
Discharge: HOME | End: 2024-06-04
Payer: COMMERCIAL

## 2024-06-04 DIAGNOSIS — I87.2 CHRONIC VENOUS INSUFFICIENCY: ICD-10-CM

## 2024-06-04 DIAGNOSIS — I89.0 LYMPHEDEMA: Primary | ICD-10-CM

## 2024-06-04 DIAGNOSIS — I83.93 VARICOSE VEINS OF BOTH LOWER EXTREMITIES, UNSPECIFIED WHETHER COMPLICATED: ICD-10-CM

## 2024-06-04 PROCEDURE — 97140 MANUAL THERAPY 1/> REGIONS: CPT

## 2024-06-04 NOTE — THERAPY TREATMENT NOTE
Outpatient Occupational Therapy Lymphedema Treatment Note   Nanette     Patient Name: Jose Luis Casas  : 1965  MRN: [id number]  Today's Date: 2024      Visit Date: 2024    Patient Active Problem List   Diagnosis    Essential hypertension    Hyperlipidemia    Hiatal hernia    Class 1 obesity due to excess calories without serious comorbidity with body mass index (BMI) of 32.0 to 32.9 in adult    Fatigue    Tinnitus    Esophageal reflux    Edema    Degenerative disc disease, lumbar    Hydrocele    Paresis of single lower extremity    Gynecomastia, male    Benign prostatic hyperplasia without lower urinary tract symptoms    Allergic rhinitis    Venous insufficiency of both lower extremities    Screening PSA (prostate specific antigen)    Mediastinal mass    Coronary artery disease involving native coronary artery of native heart without angina pectoris        Past Medical History:   Diagnosis Date    Colitis     Degenerative disc disease, lumbar 2016    Diverticulosis     Edema, lower extremity     Essential hypertension 2016    GERD (gastroesophageal reflux disease)     Hiatal hernia     High cholesterol 2014    History of COVID-19 2022    Hydrocele     Screening PSA (prostate specific antigen) 10/28/2016    Tinnitus, bilateral     Weakness of left lower extremity 2016        Past Surgical History:   Procedure Laterality Date    COLONOSCOPY  2017    COLONOSCOPY N/A 2021    Procedure: COLONOSCOPY;  Surgeon: Antonette Browning MD;  Location: [facility] ENDOSCOPY;  Service: Gastroenterology;  Laterality: N/A;  COLON POLYPS    HYDROCELECTOMY  2015    x2    TONSILLECTOMY           Visit Dx:      ICD-10-CM ICD-9-CM   1. Lymphedema  I89.0 457.1   2. Chronic venous insufficiency  I87.2 459.81   3. Varicose veins of both lower extremities, unspecified whether complicated  I83.93 454.9        Lymphedema       Row Name 24 1100             Subjective Pain    Able to  rate subjective pain? yes  -SF      Pre-Treatment Pain Level 0  -SF      Post-Treatment Pain Level 0  -SF         Subjective    Subjective Comments Patient reports he is ready to begin compression wrapping.  -SF         Lymphedema Assessment    Lymphedema Classification RLE:;LLE:;stage 2 (Spontaneously Irreversible)  -SF         Skin Changes/Observations    Location/Assessment Lower Extremity  -SF      Lower Extremity Conditions bilateral:;normal;intact;clean  -SF      Lower Extremity Color/Pigment bilateral:;erythema;brawny  varicose veins, hemosiderin staining  -SF         Manual Lymphatic Drainage    Manual Lymphatic Drainage initial sequence;opened regional lymph nodes;opened anastamoses;extremity treatment  -SF      Initial Sequence short neck;cervical;shoulder collectors;supraclavicular;diaphragmatic breathing  -SF      Diaphragmatic Breathing x10  -SF      Extremity Treatment MLD to full limb  -SF      MLD to Full Limb BLEs  -SF      Manual Therapy Patient tolerated treatment well with no complaints.  -SF         Compression/Skin Care    Compression/Skin Care bandaging;wrapping location;skin care  -SF      Skin Care moisturizing lotion applied  -SF      Wrapping Location lower extremity  -SF      Wrapping Location LE bilateral:;foot to knee  -SF      Bandage Layers cotton liner;padding/fluff layer;soft foam- 1/4 inch;short-stretch bandages (comment size/quantity)  -SF      Bandaging Technique circumferential/spiral;moderate compression  -SF      Compression/Skin Care Comments x3 compression wraps utilized on BLEs to improve tolerance. 1/4 inch foam fashioned in kidney bean shape applied to medial ankles to address ankle swelling.  -SF                User Key  (r) = Recorded By, (t) = Taken By, (c) = Cosigned By      Initials Name Provider Type     Maddie Razo OT Occupational Therapist                             OT Assessment/Plan       Row Name 06/04/24 8981          OT Assessment    Functional  Limitations Limitations in functional capacity and performance;Performance in leisure activities  -SF     Impairments Impaired lymphatic circulation;Impaired venous circulation  -SF     Assessment Comments Patient tolerated treatment well on this date.  Patient will continue to benefit from skilled occupational therapy to facilitate a reduction of bilateral lower extremity lymphedema until a plateau is achieved, patient has obtained permanent compression garments and is independent with complete decongestive therapy.  -SF     OT Diagnosis Lymphedema, chronic venous insufficiency  -SF     OT Rehab Potential Good  -SF     Patient would benefit from skilled therapy intervention Yes  -SF        OT Plan    OT Plan Comments Continue POC  -SF               User Key  (r) = Recorded By, (t) = Taken By, (c) = Cosigned By      Initials Name Provider Type    Maddie Clements OT Occupational Therapist                        Manual Rx (Last 36 Hours)       Manual Treatments       Row Name 06/04/24 1304             Total Minutes    29793 - OT Manual Therapy Minutes 45  -SF                User Key  (r) = Recorded By, (t) = Taken By, (c) = Cosigned By      Initials Name Provider Type    Maddie Clements OT Occupational Therapist                      Therapy Education  Education Details: Therapist educating patient that he may experience some calf cramping due to the compression wraps and was encouraged to put weight through his extremities and engage in HEP to address cramping. Therapist educating patient he can remove his wraps and shower before next appointment. Therapist educating patient that he will get a second set of wraps.  Given: Symptoms/condition management, Edema management  Program: New  How Provided: Verbal  Provided to: Patient  Level of Understanding: Verbalized                Time Calculation:   Timed Charges  10660 - OT Manual Therapy Minutes: 45  Total Minutes  Timed Charges Total Minutes: 45   Total Minutes: 45      Therapy Charges for Today       Code Description Service Date Service Provider Modifiers Qty    52545412770 HC OT MANUAL THERAPY EA 15 MIN 6/4/2024 Maddie Razo, NATALIYA GO 3                        Maddie Razo OT  6/4/2024

## 2024-06-05 ENCOUNTER — HOSPITAL ENCOUNTER (OUTPATIENT)
Dept: OCCUPATIONAL THERAPY | Facility: HOSPITAL | Age: 59
Setting detail: THERAPIES SERIES
Discharge: HOME | End: 2024-06-05
Payer: COMMERCIAL

## 2024-06-05 DIAGNOSIS — I83.93 VARICOSE VEINS OF BOTH LOWER EXTREMITIES, UNSPECIFIED WHETHER COMPLICATED: ICD-10-CM

## 2024-06-05 DIAGNOSIS — I89.0 LYMPHEDEMA: Primary | ICD-10-CM

## 2024-06-05 DIAGNOSIS — I87.2 CHRONIC VENOUS INSUFFICIENCY: ICD-10-CM

## 2024-06-05 PROCEDURE — 97140 MANUAL THERAPY 1/> REGIONS: CPT

## 2024-06-05 PROCEDURE — 97535 SELF CARE MNGMENT TRAINING: CPT

## 2024-06-05 NOTE — THERAPY TREATMENT NOTE
Outpatient Occupational Therapy Lymphedema Treatment Note   Nanette     Patient Name: Jose Luis Casas  : 1965  MRN: [id number]  Today's Date: 2024      Visit Date: 2024    Patient Active Problem List   Diagnosis    Essential hypertension    Hyperlipidemia    Hiatal hernia    Class 1 obesity due to excess calories without serious comorbidity with body mass index (BMI) of 32.0 to 32.9 in adult    Fatigue    Tinnitus    Esophageal reflux    Edema    Degenerative disc disease, lumbar    Hydrocele    Paresis of single lower extremity    Gynecomastia, male    Benign prostatic hyperplasia without lower urinary tract symptoms    Allergic rhinitis    Venous insufficiency of both lower extremities    Screening PSA (prostate specific antigen)    Mediastinal mass    Coronary artery disease involving native coronary artery of native heart without angina pectoris        Past Medical History:   Diagnosis Date    Colitis     Degenerative disc disease, lumbar 2016    Diverticulosis     Edema, lower extremity     Essential hypertension 2016    GERD (gastroesophageal reflux disease)     Hiatal hernia     High cholesterol 2014    History of COVID-19 2022    Hydrocele     Screening PSA (prostate specific antigen) 10/28/2016    Tinnitus, bilateral     Weakness of left lower extremity 2016        Past Surgical History:   Procedure Laterality Date    COLONOSCOPY  2017    COLONOSCOPY N/A 2021    Procedure: COLONOSCOPY;  Surgeon: Antonette Browning MD;  Location: [facility] ENDOSCOPY;  Service: Gastroenterology;  Laterality: N/A;  COLON POLYPS    HYDROCELECTOMY  2015    x2    TONSILLECTOMY           Visit Dx:      ICD-10-CM ICD-9-CM   1. Lymphedema  I89.0 457.1   2. Chronic venous insufficiency  I87.2 459.81   3. Varicose veins of both lower extremities, unspecified whether complicated  I83.93 454.9        Lymphedema       Row Name 24 1300             Subjective Pain    Able to  "rate subjective pain? yes  -SC      Pre-Treatment Pain Level 0  -SC      Post-Treatment Pain Level 0  -SC         Subjective    Subjective Comments Pt states he tolerated the wraps \"okay.\" Pt states that they felt a little claustrophobic but wasn't as bad as he thought it would be. Pt did c/o tenderness R Achilles region.  -SC         Skin Changes/Observations    Location/Assessment Lower Extremity  -SC      Lower Extremity Conditions bilateral:;normal;intact;clean  -SC      Lower Extremity Color/Pigment bilateral:;erythema;brawny  varicose veins, hemosiderin staining  -SC         Compression/Skin Care    Compression/Skin Care skin care;wrapping location;bandaging  -SC      Skin Care moisturizing lotion applied  -SC      Wrapping Location lower extremity  -SC      Wrapping Location LE bilateral:;foot to knee  -SC      Bandage Layers cotton liner;padding/fluff layer;soft foam- 1/4 inch;short-stretch bandages (comment size/quantity)  -SC      Bandaging Comments 1/4 inch foam applied to circumference of bilateral ankle to improve comfort and build shape  -SC      Bandaging Technique circumferential/spiral;strong compression  -SC                User Key  (r) = Recorded By, (t) = Taken By, (c) = Cosigned By      Initials Name Provider Type    SC Nataly Kerns COTA Occupational Therapist Assistant                             OT Assessment/Plan       Row Name 06/05/24 3662          OT Assessment    Assessment Comments Patient will continue to benefit from skilled occupational therapy to facilitate a reduction of bilateral lower extremity lymphedema until a plateau is achieved, patient has obtained permanent compression garments and is independent with complete decongestive therapy.  -SC     Patient would benefit from skilled therapy intervention Yes  -SC        OT Plan    OT Plan Comments Continue POC  -SC               User Key  (r) = Recorded By, (t) = Taken By, (c) = Cosigned By      Initials Name Provider Type    SC " Nataly Kerns COTA Occupational Therapist Assistant                        Manual Rx (Last 36 Hours)       Manual Treatments       Row Name 06/05/24 1331             Total Minutes    73564 - OT Manual Therapy Minutes 25  -SC                User Key  (r) = Recorded By, (t) = Taken By, (c) = Cosigned By      Initials Name Provider Type    SC Nataly Kerns COTA Occupational Therapist Assistant                      Therapy Education  Education Details: Therapist discussing different garment options with patient secondary to patient nearing plateau.  Therapist discussing with patient 30 to 40 mmHg compression stockings, CircAid Juxtalite HD, and Solaris ready wrap calf unit.  Therapist did have demos for patient to feel and see.  Therapist educating patient on wear schedule with garments, when to replace, and how to care for garments.  Patient states that he is willing to pay for a pair of garments out-of-pocket in order to get them quicker and see if his insurance will pay for any portion of another set.  Therapist giving patient time to decide which garment he thinks would be best.  Therapist will work on getting out-of-pocket costs for patient next appointment  Given: Symptoms/condition management  Program: New  How Provided: Verbal, Demonstration  Provided to: Patient  Level of Understanding: Verbalized  30348 - OT Self Care/Mgmt Minutes: 22                Time Calculation:   Timed Charges  77932 - OT Manual Therapy Minutes: 25  09327 - OT Self Care/Mgmt Minutes: 22  Total Minutes  Timed Charges Total Minutes: 47   Total Minutes: 47     Therapy Charges for Today       Code Description Service Date Service Provider Modifiers Qty    07723707120 HC OT MANUAL THERAPY EA 15 MIN 6/5/2024 Nataly Kerns COTA GO 2    46048392030 HC OT SELF CARE/MGMT/TRAIN EA 15 MIN 6/5/2024 Nataly Kerns COTA GO 1                        EUFEMIA Bolaños  6/5/2024

## 2024-06-06 ENCOUNTER — APPOINTMENT (OUTPATIENT)
Dept: OCCUPATIONAL THERAPY | Facility: HOSPITAL | Age: 59
End: 2024-06-06
Payer: COMMERCIAL

## 2024-06-07 ENCOUNTER — APPOINTMENT (OUTPATIENT)
Dept: OCCUPATIONAL THERAPY | Facility: HOSPITAL | Age: 59
End: 2024-06-07
Payer: COMMERCIAL

## 2024-06-10 ENCOUNTER — APPOINTMENT (OUTPATIENT)
Dept: OCCUPATIONAL THERAPY | Facility: HOSPITAL | Age: 59
End: 2024-06-10
Payer: COMMERCIAL

## 2024-06-11 ENCOUNTER — APPOINTMENT (OUTPATIENT)
Dept: OCCUPATIONAL THERAPY | Facility: HOSPITAL | Age: 59
End: 2024-06-11
Payer: COMMERCIAL

## 2024-06-12 ENCOUNTER — APPOINTMENT (OUTPATIENT)
Dept: OCCUPATIONAL THERAPY | Facility: HOSPITAL | Age: 59
End: 2024-06-12
Payer: COMMERCIAL

## 2024-06-13 ENCOUNTER — APPOINTMENT (OUTPATIENT)
Dept: OCCUPATIONAL THERAPY | Facility: HOSPITAL | Age: 59
End: 2024-06-13
Payer: COMMERCIAL

## 2024-06-14 ENCOUNTER — APPOINTMENT (OUTPATIENT)
Dept: OCCUPATIONAL THERAPY | Facility: HOSPITAL | Age: 59
End: 2024-06-14
Payer: COMMERCIAL

## 2024-06-17 ENCOUNTER — APPOINTMENT (OUTPATIENT)
Dept: OCCUPATIONAL THERAPY | Facility: HOSPITAL | Age: 59
End: 2024-06-17
Payer: COMMERCIAL

## 2024-06-18 ENCOUNTER — HOSPITAL ENCOUNTER (OUTPATIENT)
Dept: CARDIOLOGY | Facility: HOSPITAL | Age: 59
Discharge: HOME | End: 2024-06-18
Admitting: NURSE PRACTITIONER
Payer: COMMERCIAL

## 2024-06-18 ENCOUNTER — APPOINTMENT (OUTPATIENT)
Dept: OCCUPATIONAL THERAPY | Facility: HOSPITAL | Age: 59
End: 2024-06-18
Payer: COMMERCIAL

## 2024-06-18 ENCOUNTER — OFFICE VISIT (OUTPATIENT)
Dept: INTERNAL MEDICINE | Age: 59
End: 2024-06-18
Payer: COMMERCIAL

## 2024-06-18 VITALS
SYSTOLIC BLOOD PRESSURE: 106 MMHG | HEIGHT: 70 IN | RESPIRATION RATE: 18 BRPM | WEIGHT: 228.4 LBS | DIASTOLIC BLOOD PRESSURE: 58 MMHG | BODY MASS INDEX: 32.7 KG/M2 | HEART RATE: 68 BPM | TEMPERATURE: 97.4 F | OXYGEN SATURATION: 98 %

## 2024-06-18 DIAGNOSIS — L03.115 CELLULITIS OF RIGHT LOWER EXTREMITY: Primary | ICD-10-CM

## 2024-06-18 DIAGNOSIS — M79.89 LEG SWELLING: ICD-10-CM

## 2024-06-18 LAB
BH CV LOWER VASCULAR LEFT COMMON FEMORAL AUGMENT: NORMAL
BH CV LOWER VASCULAR LEFT COMMON FEMORAL COMPETENT: NORMAL
BH CV LOWER VASCULAR LEFT COMMON FEMORAL COMPRESS: NORMAL
BH CV LOWER VASCULAR LEFT COMMON FEMORAL PHASIC: NORMAL
BH CV LOWER VASCULAR LEFT COMMON FEMORAL SPONT: NORMAL
BH CV LOWER VASCULAR RIGHT COMMON FEMORAL AUGMENT: NORMAL
BH CV LOWER VASCULAR RIGHT COMMON FEMORAL COMPETENT: NORMAL
BH CV LOWER VASCULAR RIGHT COMMON FEMORAL COMPRESS: NORMAL
BH CV LOWER VASCULAR RIGHT COMMON FEMORAL PHASIC: NORMAL
BH CV LOWER VASCULAR RIGHT COMMON FEMORAL SPONT: NORMAL
BH CV LOWER VASCULAR RIGHT DISTAL FEMORAL COMPRESS: NORMAL
BH CV LOWER VASCULAR RIGHT GASTRONEMIUS COMPRESS: NORMAL
BH CV LOWER VASCULAR RIGHT GREATER SAPH AK COMPRESS: NORMAL
BH CV LOWER VASCULAR RIGHT GREATER SAPH BK COMPRESS: NORMAL
BH CV LOWER VASCULAR RIGHT LESSER SAPH COMPRESS: NORMAL
BH CV LOWER VASCULAR RIGHT MID FEMORAL AUGMENT: NORMAL
BH CV LOWER VASCULAR RIGHT MID FEMORAL COMPETENT: NORMAL
BH CV LOWER VASCULAR RIGHT MID FEMORAL COMPRESS: NORMAL
BH CV LOWER VASCULAR RIGHT MID FEMORAL PHASIC: NORMAL
BH CV LOWER VASCULAR RIGHT MID FEMORAL SPONT: NORMAL
BH CV LOWER VASCULAR RIGHT PERONEAL COMPRESS: NORMAL
BH CV LOWER VASCULAR RIGHT POPLITEAL AUGMENT: NORMAL
BH CV LOWER VASCULAR RIGHT POPLITEAL COMPETENT: NORMAL
BH CV LOWER VASCULAR RIGHT POPLITEAL COMPRESS: NORMAL
BH CV LOWER VASCULAR RIGHT POPLITEAL PHASIC: NORMAL
BH CV LOWER VASCULAR RIGHT POPLITEAL SPONT: NORMAL
BH CV LOWER VASCULAR RIGHT POSTERIOR TIBIAL COMPRESS: NORMAL
BH CV LOWER VASCULAR RIGHT PROXIMAL FEMORAL COMPRESS: NORMAL
BH CV LOWER VASCULAR RIGHT SAPHENOFEMORAL JUNCTION COMPRESS: NORMAL

## 2024-06-18 PROCEDURE — 99213 OFFICE O/P EST LOW 20 MIN: CPT | Performed by: NURSE PRACTITIONER

## 2024-06-18 PROCEDURE — 93971 EXTREMITY STUDY: CPT

## 2024-06-18 PROCEDURE — 93971 EXTREMITY STUDY: CPT | Performed by: SURGERY

## 2024-06-18 RX ORDER — DOXYCYCLINE HYCLATE 100 MG/1
100 CAPSULE ORAL 2 TIMES DAILY
Qty: 20 CAPSULE | Refills: 0 | Status: SHIPPED | OUTPATIENT
Start: 2024-06-18 | End: 2024-06-28

## 2024-06-18 NOTE — PROGRESS NOTES
"Chief Complaint  Leg Pain (58 years old male here today complaining of leg pain, redness and swelling. States he has had this ongoing for years and has seen Lymphedema clinic but states LETICIA Boots did not help. States this flare has been since Saturday )    Subjective      Jose Luis Casas is a 58-year-old male that presents to [facility] with complaints of increased swelling, redness and pain to the right lower extremity.  Symptoms started 3 days ago with nausea, vomiting, body aches and temperature up to 99.1.  He states he has also had some shortness of breath with exertion.  He denies any recent travel.  He has been on augmentin per his physician daughter, has about 3 days left.  Symptoms have only mildly improved.  He is a patient of the lymphedema clinic but states this is different than his normal edema.  No upcoming appointment.      History of Present Illness    Current Outpatient Medications   Medication Instructions    aspirin 81 mg, Oral, Daily    atorvastatin (LIPITOR) 40 mg, Oral, Daily    benazepril (LOTENSIN) 20 mg, Oral, Daily    carvedilol CR (COREG CR) 40 mg, Oral, Daily    Cholecalciferol 125 mcg, Oral, Daily    doxycycline (VIBRAMYCIN) 100 mg, Oral, 2 Times Daily    fluticasone (FLONASE) 50 MCG/ACT nasal spray 2 sprays, Each Nare, Daily    furosemide (LASIX) 20 mg, Oral, Daily    omeprazole (PRILOSEC) 20 mg, Oral, Daily    tamsulosin (FLOMAX) 0.4 mg, Oral, Daily    Vibegron 75 mg, Oral, Daily       The following portions of the patient's history were reviewed and updated as appropriate: allergies, current medications, past family history, past medical history, past social history, past surgical history, and problem list.    Objective   Vital Signs:   /58 (BP Location: Left arm, Patient Position: Sitting)   Pulse 68   Temp 97.4 °F (36.3 °C) (Temporal)   Resp 18   Ht 177.8 cm (70\")   Wt 104 kg (228 lb 6.4 oz)   SpO2 98%   BMI 32.77 kg/m²     Wt " Readings from Last 3 Encounters:   06/18/24 104 kg (228 lb 6.4 oz)   01/24/24 106 kg (233 lb)   01/09/24 106 kg (233 lb)     BP Readings from Last 3 Encounters:   06/18/24 106/58   01/24/24 132/86   01/09/24 161/94     Physical Exam  Vitals and nursing note reviewed.   Constitutional:       Appearance: Normal appearance. He is not ill-appearing.   HENT:      Head: Normocephalic and atraumatic.   Cardiovascular:      Rate and Rhythm: Normal rate and regular rhythm.      Heart sounds: Normal heart sounds.   Pulmonary:      Effort: Pulmonary effort is normal.      Breath sounds: Normal breath sounds.   Skin:     General: Skin is warm and dry.      Findings: Erythema present.             Comments: Right lower extremity erythema and edema.   Neurological:      Mental Status: He is alert and oriented to person, place, and time.   Psychiatric:         Mood and Affect: Mood normal.         Behavior: Behavior normal.          Result Review :  The following data was reviewed by: FILIBERTO Bryant on 06/18/2024:      Common labs          10/17/2023    10:06 11/6/2023    07:34   Common Labs   Glucose 94  89    BUN 11  11    Creatinine 0.87  0.82    Sodium 129  136    Potassium 4.5  4.5    Chloride 93  101    Calcium 10.0  9.8    Albumin 4.6     Total Bilirubin 0.4     Alkaline Phosphatase 59     AST (SGOT) 21     ALT (SGPT) 27     WBC 6.70     Hemoglobin 14.2     Hematocrit 40.6     Platelets 302     Total Cholesterol 165     Triglycerides 143     HDL Cholesterol 44     LDL Cholesterol  96     PSA 2.170         Lab Results (last 72 hours)       ** No results found for the last 72 hours. **             No Images in the past 120 days found..    Lab Results   Component Value Date    COVID19 NOT DETECTED 12/05/2020    INR 0.97 05/01/2023    BILIRUBINUR Negative 03/27/2023    POCGLU 81 10/16/2023       Procedures        Assessment and Plan   Diagnoses and all orders for this visit:    1. Cellulitis of right lower extremity  (Primary)  -     doxycycline (VIBRAMYCIN) 100 MG capsule; Take 1 capsule by mouth 2 (Two) Times a Day for 10 days.  Dispense: 20 capsule; Refill: 0    2. Leg swelling  -     Duplex Venous Lower Extremity - Right CAR; Future                  There are no discontinued medications.       Follow Up   No follow-ups on file.  Patient was given instructions and counseling regarding his condition or for health maintenance advice. Please see specific information pulled into the AVS if appropriate.       FILIBERTO Bryant  06/18/24  14:43 EDT

## 2024-06-26 ENCOUNTER — TELEPHONE (OUTPATIENT)
Dept: SURGERY | Facility: CLINIC | Age: 59
End: 2024-06-26
Payer: COMMERCIAL

## 2024-07-01 ENCOUNTER — LAB (OUTPATIENT)
Dept: LAB | Facility: HOSPITAL | Age: 59
End: 2024-07-01
Payer: COMMERCIAL

## 2024-07-01 DIAGNOSIS — N40.0 BENIGN PROSTATIC HYPERPLASIA WITHOUT LOWER URINARY TRACT SYMPTOMS: ICD-10-CM

## 2024-07-01 DIAGNOSIS — J30.89 ALLERGIC RHINITIS DUE TO OTHER ALLERGIC TRIGGER, UNSPECIFIED SEASONALITY: ICD-10-CM

## 2024-07-01 DIAGNOSIS — K44.9 DIAPHRAGMATIC HERNIA WITHOUT OBSTRUCTION OR GANGRENE: ICD-10-CM

## 2024-07-01 DIAGNOSIS — E78.00 PURE HYPERCHOLESTEROLEMIA: ICD-10-CM

## 2024-07-01 LAB
ALBUMIN SERPL-MCNC: 4.2 G/DL (ref 3.5–5.2)
ALBUMIN/GLOB SERPL: 1.4 G/DL
ALP SERPL-CCNC: 64 U/L (ref 39–117)
ALT SERPL W P-5'-P-CCNC: 26 U/L (ref 1–41)
ANION GAP SERPL CALCULATED.3IONS-SCNC: 10.5 MMOL/L (ref 5–15)
AST SERPL-CCNC: 16 U/L (ref 1–40)
BASOPHILS # BLD AUTO: 0.04 10*3/MM3 (ref 0–0.2)
BASOPHILS NFR BLD AUTO: 0.6 % (ref 0–1.5)
BILIRUB SERPL-MCNC: 0.4 MG/DL (ref 0–1.2)
BUN SERPL-MCNC: 13 MG/DL (ref 6–20)
BUN/CREAT SERPL: 14.9 (ref 7–25)
CALCIUM SPEC-SCNC: 10.1 MG/DL (ref 8.6–10.5)
CHLORIDE SERPL-SCNC: 97 MMOL/L (ref 98–107)
CHOLEST SERPL-MCNC: 159 MG/DL (ref 0–200)
CO2 SERPL-SCNC: 26.5 MMOL/L (ref 22–29)
CREAT SERPL-MCNC: 0.87 MG/DL (ref 0.76–1.27)
DEPRECATED RDW RBC AUTO: 41.7 FL (ref 37–54)
EGFRCR SERPLBLD CKD-EPI 2021: 100.6 ML/MIN/1.73
EOSINOPHIL # BLD AUTO: 0.15 10*3/MM3 (ref 0–0.4)
EOSINOPHIL NFR BLD AUTO: 2.2 % (ref 0.3–6.2)
ERYTHROCYTE [DISTWIDTH] IN BLOOD BY AUTOMATED COUNT: 12.4 % (ref 12.3–15.4)
GLOBULIN UR ELPH-MCNC: 2.9 GM/DL
GLUCOSE SERPL-MCNC: 96 MG/DL (ref 65–99)
HCT VFR BLD AUTO: 40.8 % (ref 37.5–51)
HDLC SERPL-MCNC: 38 MG/DL (ref 40–60)
HGB BLD-MCNC: 13.7 G/DL (ref 13–17.7)
IMM GRANULOCYTES # BLD AUTO: 0.02 10*3/MM3 (ref 0–0.05)
IMM GRANULOCYTES NFR BLD AUTO: 0.3 % (ref 0–0.5)
LDLC SERPL CALC-MCNC: 83 MG/DL (ref 0–100)
LDLC/HDLC SERPL: 1.98 {RATIO}
LYMPHOCYTES # BLD AUTO: 3.05 10*3/MM3 (ref 0.7–3.1)
LYMPHOCYTES NFR BLD AUTO: 43.9 % (ref 19.6–45.3)
MCH RBC QN AUTO: 30.9 PG (ref 26.6–33)
MCHC RBC AUTO-ENTMCNC: 33.6 G/DL (ref 31.5–35.7)
MCV RBC AUTO: 92.1 FL (ref 79–97)
MONOCYTES # BLD AUTO: 0.81 10*3/MM3 (ref 0.1–0.9)
MONOCYTES NFR BLD AUTO: 11.7 % (ref 5–12)
NEUTROPHILS NFR BLD AUTO: 2.88 10*3/MM3 (ref 1.7–7)
NEUTROPHILS NFR BLD AUTO: 41.3 % (ref 42.7–76)
NRBC BLD AUTO-RTO: 0 /100 WBC (ref 0–0.2)
PLATELET # BLD AUTO: 434 10*3/MM3 (ref 140–450)
PMV BLD AUTO: 9.2 FL (ref 6–12)
POTASSIUM SERPL-SCNC: 4.8 MMOL/L (ref 3.5–5.2)
PROT SERPL-MCNC: 7.1 G/DL (ref 6–8.5)
RBC # BLD AUTO: 4.43 10*6/MM3 (ref 4.14–5.8)
SODIUM SERPL-SCNC: 134 MMOL/L (ref 136–145)
TRIGL SERPL-MCNC: 229 MG/DL (ref 0–150)
VLDLC SERPL-MCNC: 38 MG/DL (ref 5–40)
WBC NRBC COR # BLD AUTO: 6.95 10*3/MM3 (ref 3.4–10.8)

## 2024-07-01 PROCEDURE — 80053 COMPREHEN METABOLIC PANEL: CPT

## 2024-07-01 PROCEDURE — 80061 LIPID PANEL: CPT

## 2024-07-01 PROCEDURE — 36415 COLL VENOUS BLD VENIPUNCTURE: CPT

## 2024-07-01 PROCEDURE — 85025 COMPLETE CBC W/AUTO DIFF WBC: CPT

## 2024-07-03 ENCOUNTER — HOSPITAL ENCOUNTER (OUTPATIENT)
Dept: CT IMAGING | Facility: HOSPITAL | Age: 59
Discharge: HOME | End: 2024-07-03
Admitting: STUDENT IN AN ORGANIZED HEALTH CARE EDUCATION/TRAINING PROGRAM
Payer: COMMERCIAL

## 2024-07-03 DIAGNOSIS — J98.59 MEDIASTINAL MASS: ICD-10-CM

## 2024-07-03 PROCEDURE — 71250 CT THORAX DX C-: CPT

## 2024-07-09 ENCOUNTER — OFFICE VISIT (OUTPATIENT)
Dept: INTERNAL MEDICINE | Age: 59
End: 2024-07-09
Payer: COMMERCIAL

## 2024-07-09 VITALS
WEIGHT: 232 LBS | OXYGEN SATURATION: 98 % | BODY MASS INDEX: 33.21 KG/M2 | HEIGHT: 70 IN | DIASTOLIC BLOOD PRESSURE: 90 MMHG | SYSTOLIC BLOOD PRESSURE: 148 MMHG | TEMPERATURE: 98 F

## 2024-07-09 DIAGNOSIS — K21.9 GASTROESOPHAGEAL REFLUX DISEASE WITHOUT ESOPHAGITIS: ICD-10-CM

## 2024-07-09 DIAGNOSIS — J30.89 ALLERGIC RHINITIS DUE TO OTHER ALLERGIC TRIGGER, UNSPECIFIED SEASONALITY: ICD-10-CM

## 2024-07-09 DIAGNOSIS — I10 ESSENTIAL HYPERTENSION: ICD-10-CM

## 2024-07-09 DIAGNOSIS — E78.00 PURE HYPERCHOLESTEROLEMIA: ICD-10-CM

## 2024-07-09 DIAGNOSIS — E78.5 HYPERLIPIDEMIA, UNSPECIFIED HYPERLIPIDEMIA TYPE: ICD-10-CM

## 2024-07-09 DIAGNOSIS — Z00.00 PHYSICAL EXAM, ANNUAL: Primary | ICD-10-CM

## 2024-07-09 DIAGNOSIS — K44.9 DIAPHRAGMATIC HERNIA WITHOUT OBSTRUCTION OR GANGRENE: ICD-10-CM

## 2024-07-09 DIAGNOSIS — I25.10 CORONARY ARTERY DISEASE INVOLVING NATIVE CORONARY ARTERY OF NATIVE HEART WITHOUT ANGINA PECTORIS: ICD-10-CM

## 2024-07-09 DIAGNOSIS — Z12.5 SCREENING PSA (PROSTATE SPECIFIC ANTIGEN): ICD-10-CM

## 2024-07-09 DIAGNOSIS — I87.2 VENOUS INSUFFICIENCY OF BOTH LOWER EXTREMITIES: ICD-10-CM

## 2024-07-09 DIAGNOSIS — J98.59 MEDIASTINAL MASS: ICD-10-CM

## 2024-07-09 DIAGNOSIS — N40.0 BENIGN PROSTATIC HYPERPLASIA WITHOUT LOWER URINARY TRACT SYMPTOMS: ICD-10-CM

## 2024-07-09 DIAGNOSIS — R60.9 EDEMA, UNSPECIFIED TYPE: ICD-10-CM

## 2024-07-09 DIAGNOSIS — Z12.11 ENCOUNTER FOR SCREENING FOR MALIGNANT NEOPLASM OF COLON: ICD-10-CM

## 2024-07-09 PROCEDURE — 99396 PREV VISIT EST AGE 40-64: CPT | Performed by: INTERNAL MEDICINE

## 2024-07-09 RX ORDER — CARVEDILOL PHOSPHATE 40 MG/1
40 CAPSULE, EXTENDED RELEASE ORAL DAILY
Qty: 90 CAPSULE | Refills: 3 | Status: SHIPPED | OUTPATIENT
Start: 2024-07-09

## 2024-07-09 RX ORDER — FLUTICASONE PROPIONATE 50 MCG
2 SPRAY, SUSPENSION (ML) NASAL DAILY
Qty: 16 G | Refills: 2 | Status: SHIPPED | OUTPATIENT
Start: 2024-07-09

## 2024-07-09 RX ORDER — ASPIRIN 81 MG/1
81 TABLET ORAL DAILY
Qty: 90 TABLET | Refills: 3 | Status: SHIPPED | OUTPATIENT
Start: 2024-07-09

## 2024-07-09 RX ORDER — FUROSEMIDE 20 MG/1
20 TABLET ORAL DAILY
Qty: 90 TABLET | Refills: 3 | Status: SHIPPED | OUTPATIENT
Start: 2024-07-09 | End: 2025-07-04

## 2024-07-09 RX ORDER — BENAZEPRIL HYDROCHLORIDE 20 MG/1
20 TABLET ORAL DAILY
Qty: 90 TABLET | Refills: 3 | Status: SHIPPED | OUTPATIENT
Start: 2024-07-09

## 2024-07-09 RX ORDER — ATORVASTATIN CALCIUM 40 MG/1
40 TABLET, FILM COATED ORAL DAILY
Qty: 90 TABLET | Refills: 3 | Status: SHIPPED | OUTPATIENT
Start: 2024-07-09 | End: 2024-07-09

## 2024-07-09 RX ORDER — OMEPRAZOLE 20 MG/1
20 CAPSULE, DELAYED RELEASE ORAL DAILY
Qty: 90 CAPSULE | Refills: 3 | Status: SHIPPED | OUTPATIENT
Start: 2024-07-09

## 2024-07-09 RX ORDER — ROSUVASTATIN CALCIUM 40 MG/1
40 TABLET, COATED ORAL DAILY
Qty: 90 TABLET | Refills: 3 | Status: SHIPPED | OUTPATIENT
Start: 2024-07-09

## 2024-07-09 RX ORDER — TAMSULOSIN HYDROCHLORIDE 0.4 MG/1
1 CAPSULE ORAL DAILY
Qty: 90 CAPSULE | Refills: 3 | Status: SHIPPED | OUTPATIENT
Start: 2024-07-09

## 2024-07-09 RX ORDER — DOXYCYCLINE HYCLATE 100 MG/1
100 CAPSULE ORAL 2 TIMES DAILY
Qty: 20 CAPSULE | Refills: 1 | Status: SHIPPED | OUTPATIENT
Start: 2024-07-09

## 2024-07-09 NOTE — PROGRESS NOTES
"CHIEF COMPLAINT/ HPI: Patient is here for an annual checkup also which is overdue according to care gaps, discussed preventive measures as below, here to review labs,  Hypertension (Routine follow up. Lab follow up. Med Refill. Pt is asking for a augmentin refill to have on hand. )              Objective   Vital Signs  Vitals:    07/09/24 0953   BP: 148/90   Temp: 98 °F (36.7 °C)   SpO2: 98%   Weight: 105 kg (232 lb)   Height: 177.8 cm (70\")      Body mass index is 33.29 kg/m².  Review of Systems   Constitutional: Negative.    HENT: Negative.     Eyes: Negative.    Respiratory: Negative.     Cardiovascular: Negative.    Gastrointestinal: Negative.    Endocrine: Negative.    Genitourinary: Negative.    Musculoskeletal: Negative.    Allergic/Immunologic: Negative.    Neurological: Negative.    Hematological: Negative.    Psychiatric/Behavioral: Negative.        Physical Exam  Constitutional:       General: He is not in acute distress.     Appearance: Normal appearance.   HENT:      Head: Normocephalic.      Mouth/Throat:      Mouth: Mucous membranes are moist.   Eyes:      Conjunctiva/sclera: Conjunctivae normal.      Pupils: Pupils are equal, round, and reactive to light.   Cardiovascular:      Rate and Rhythm: Normal rate and regular rhythm.      Pulses: Normal pulses.      Heart sounds: Normal heart sounds.   Pulmonary:      Effort: Pulmonary effort is normal.      Breath sounds: Normal breath sounds.   Abdominal:      General: Abdomen is flat. Bowel sounds are normal.      Palpations: Abdomen is soft.   Musculoskeletal:         General: No swelling. Normal range of motion.      Cervical back: Neck supple.   Skin:     General: Skin is warm and dry.      Coloration: Skin is not jaundiced.   Neurological:      General: No focal deficit present.      Mental Status: He is alert and oriented to person, place, and time. Mental status is at baseline.   Psychiatric:         Mood and Affect: Mood normal.         Behavior: " Behavior normal.         Thought Content: Thought content normal.         Judgment: Judgment normal.     Lower extremity edema 2-3+ diffusely feet up to ankles and knees bilateral  Result Review :   Lab Results   Component Value Date    PROBNP 24.9 05/01/2023     CMP          10/17/2023    10:06 11/6/2023    07:34 7/1/2024    11:13   CMP   Glucose 94  89  96    BUN 11  11  13    Creatinine 0.87  0.82  0.87    EGFR 101.3  102.5  100.6    Sodium 129  136  134    Potassium 4.5  4.5  4.8    Chloride 93  101  97    Calcium 10.0  9.8  10.1    Total Protein 7.2   7.1    Albumin 4.6   4.2    Globulin 2.6   2.9    Total Bilirubin 0.4   0.4    Alkaline Phosphatase 59   64    AST (SGOT) 21   16    ALT (SGPT) 27   26    Albumin/Globulin Ratio 1.8   1.4    BUN/Creatinine Ratio 12.6  13.4  14.9    Anion Gap 9.0  8.0  10.5      CBC w/diff          10/17/2023    10:06 7/1/2024    11:13   CBC w/Diff   WBC 6.70  6.95    RBC 4.47  4.43    Hemoglobin 14.2  13.7    Hematocrit 40.6  40.8    MCV 90.8  92.1    MCH 31.8  30.9    MCHC 35.0  33.6    RDW 12.2  12.4    Platelets 302  434    Neutrophil Rel % 48.1  41.3    Immature Granulocyte Rel % 0.3  0.3    Lymphocyte Rel % 37.2  43.9    Monocyte Rel % 10.7  11.7    Eosinophil Rel % 3.3  2.2    Basophil Rel % 0.4  0.6       Lipid Panel          10/17/2023    10:06 7/1/2024    11:13   Lipid Panel   Total Cholesterol 165  159    Triglycerides 143  229    HDL Cholesterol 44  38    VLDL Cholesterol 25  38    LDL Cholesterol  96  83    LDL/HDL Ratio 2.10  1.98       Lab Results   Component Value Date    TSH 3.030 10/17/2023    TSH 3.060 05/08/2023    TSH 2.840 04/06/2022      Lab Results   Component Value Date    FREET4 1.21 10/17/2023    FREET4 1.0 06/26/2020         PSA          10/17/2023    10:06   PSA   PSA 2.170                     Visit Diagnoses:    ICD-10-CM ICD-9-CM   1. Physical exam, annual  Z00.00 V70.0   2. Screening PSA (prostate specific antigen)  Z12.5 V76.44   3. Venous  insufficiency of both lower extremities  I87.2 459.81   4. Essential hypertension  I10 401.9   5. Coronary artery disease involving native coronary artery of native heart without angina pectoris  I25.10 414.01   6. Mediastinal mass  J98.59 786.6   7. Edema, unspecified type  R60.9 782.3   8. Benign prostatic hyperplasia without lower urinary tract symptoms  N40.0 600.00   9. Gastroesophageal reflux disease without esophagitis  K21.9 530.81   10. Hyperlipidemia, unspecified hyperlipidemia type  E78.5 272.4   11. Pure hypercholesterolemia  E78.00 272.0   12. Allergic rhinitis due to other allergic trigger, unspecified seasonality  J30.89 477.8   13. Diaphragmatic hernia without obstruction or gangrene  K44.9 553.3   14. Encounter for screening for malignant neoplasm of colon  Z12.11 V76.51       Assessment and Plan   Diagnoses and all orders for this visit:    1. Physical exam, annual (Primary)  -     aspirin 81 MG EC tablet; Take 1 tablet by mouth Daily.  Dispense: 90 tablet; Refill: 3  -     Discontinue: atorvastatin (LIPITOR) 40 MG tablet; Take 1 tablet by mouth Daily.  Dispense: 90 tablet; Refill: 3  -     benazepril (LOTENSIN) 20 MG tablet; Take 1 tablet by mouth Daily.  Dispense: 90 tablet; Refill: 3  -     carvedilol CR (Coreg CR) 40 MG 24 hr capsule; Take 1 capsule by mouth Daily.  Dispense: 90 capsule; Refill: 3  -     Cholecalciferol 125 MCG (5000 UT) tablet; Take 1 tablet by mouth Daily.  Dispense: 90 each; Refill: 2  -     furosemide (LASIX) 20 MG tablet; Take 1 tablet by mouth Daily for 360 days.  Dispense: 90 tablet; Refill: 3  -     omeprazole (priLOSEC) 20 MG capsule; Take 1 capsule by mouth Daily.  Dispense: 90 capsule; Refill: 3  -     tamsulosin (FLOMAX) 0.4 MG capsule 24 hr capsule; Take 1 capsule by mouth Daily.  Dispense: 90 capsule; Refill: 3  -     fluticasone (FLONASE) 50 MCG/ACT nasal spray; 2 sprays by Each Nare route Daily.  Dispense: 16 g; Refill: 2  -     CBC & Differential; Future  -      Comprehensive Metabolic Panel; Future  -     Lipid Panel; Future  -     PSA Screen; Future  -     Lipid Panel; Future  -     Comprehensive Metabolic Panel; Future    2. Screening PSA (prostate specific antigen)  -     aspirin 81 MG EC tablet; Take 1 tablet by mouth Daily.  Dispense: 90 tablet; Refill: 3  -     Discontinue: atorvastatin (LIPITOR) 40 MG tablet; Take 1 tablet by mouth Daily.  Dispense: 90 tablet; Refill: 3  -     benazepril (LOTENSIN) 20 MG tablet; Take 1 tablet by mouth Daily.  Dispense: 90 tablet; Refill: 3  -     carvedilol CR (Coreg CR) 40 MG 24 hr capsule; Take 1 capsule by mouth Daily.  Dispense: 90 capsule; Refill: 3  -     Cholecalciferol 125 MCG (5000 UT) tablet; Take 1 tablet by mouth Daily.  Dispense: 90 each; Refill: 2  -     furosemide (LASIX) 20 MG tablet; Take 1 tablet by mouth Daily for 360 days.  Dispense: 90 tablet; Refill: 3  -     omeprazole (priLOSEC) 20 MG capsule; Take 1 capsule by mouth Daily.  Dispense: 90 capsule; Refill: 3  -     tamsulosin (FLOMAX) 0.4 MG capsule 24 hr capsule; Take 1 capsule by mouth Daily.  Dispense: 90 capsule; Refill: 3  -     fluticasone (FLONASE) 50 MCG/ACT nasal spray; 2 sprays by Each Nare route Daily.  Dispense: 16 g; Refill: 2  -     CBC & Differential; Future  -     Comprehensive Metabolic Panel; Future  -     Lipid Panel; Future  -     PSA Screen; Future  -     Lipid Panel; Future  -     Comprehensive Metabolic Panel; Future    3. Venous insufficiency of both lower extremities  -     aspirin 81 MG EC tablet; Take 1 tablet by mouth Daily.  Dispense: 90 tablet; Refill: 3  -     Discontinue: atorvastatin (LIPITOR) 40 MG tablet; Take 1 tablet by mouth Daily.  Dispense: 90 tablet; Refill: 3  -     benazepril (LOTENSIN) 20 MG tablet; Take 1 tablet by mouth Daily.  Dispense: 90 tablet; Refill: 3  -     carvedilol CR (Coreg CR) 40 MG 24 hr capsule; Take 1 capsule by mouth Daily.  Dispense: 90 capsule; Refill: 3  -     Cholecalciferol 125 MCG (5000 UT)  tablet; Take 1 tablet by mouth Daily.  Dispense: 90 each; Refill: 2  -     furosemide (LASIX) 20 MG tablet; Take 1 tablet by mouth Daily for 360 days.  Dispense: 90 tablet; Refill: 3  -     omeprazole (priLOSEC) 20 MG capsule; Take 1 capsule by mouth Daily.  Dispense: 90 capsule; Refill: 3  -     tamsulosin (FLOMAX) 0.4 MG capsule 24 hr capsule; Take 1 capsule by mouth Daily.  Dispense: 90 capsule; Refill: 3  -     fluticasone (FLONASE) 50 MCG/ACT nasal spray; 2 sprays by Each Nare route Daily.  Dispense: 16 g; Refill: 2  -     CBC & Differential; Future  -     Comprehensive Metabolic Panel; Future  -     Lipid Panel; Future  -     PSA Screen; Future  -     Lipid Panel; Future  -     Comprehensive Metabolic Panel; Future    4. Essential hypertension  -     aspirin 81 MG EC tablet; Take 1 tablet by mouth Daily.  Dispense: 90 tablet; Refill: 3  -     Discontinue: atorvastatin (LIPITOR) 40 MG tablet; Take 1 tablet by mouth Daily.  Dispense: 90 tablet; Refill: 3  -     benazepril (LOTENSIN) 20 MG tablet; Take 1 tablet by mouth Daily.  Dispense: 90 tablet; Refill: 3  -     carvedilol CR (Coreg CR) 40 MG 24 hr capsule; Take 1 capsule by mouth Daily.  Dispense: 90 capsule; Refill: 3  -     Cholecalciferol 125 MCG (5000 UT) tablet; Take 1 tablet by mouth Daily.  Dispense: 90 each; Refill: 2  -     furosemide (LASIX) 20 MG tablet; Take 1 tablet by mouth Daily for 360 days.  Dispense: 90 tablet; Refill: 3  -     omeprazole (priLOSEC) 20 MG capsule; Take 1 capsule by mouth Daily.  Dispense: 90 capsule; Refill: 3  -     tamsulosin (FLOMAX) 0.4 MG capsule 24 hr capsule; Take 1 capsule by mouth Daily.  Dispense: 90 capsule; Refill: 3  -     fluticasone (FLONASE) 50 MCG/ACT nasal spray; 2 sprays by Each Nare route Daily.  Dispense: 16 g; Refill: 2  -     CBC & Differential; Future  -     Comprehensive Metabolic Panel; Future  -     Lipid Panel; Future  -     PSA Screen; Future  -     Lipid Panel; Future  -     Comprehensive  Metabolic Panel; Future    5. Coronary artery disease involving native coronary artery of native heart without angina pectoris  -     aspirin 81 MG EC tablet; Take 1 tablet by mouth Daily.  Dispense: 90 tablet; Refill: 3  -     Discontinue: atorvastatin (LIPITOR) 40 MG tablet; Take 1 tablet by mouth Daily.  Dispense: 90 tablet; Refill: 3  -     benazepril (LOTENSIN) 20 MG tablet; Take 1 tablet by mouth Daily.  Dispense: 90 tablet; Refill: 3  -     carvedilol CR (Coreg CR) 40 MG 24 hr capsule; Take 1 capsule by mouth Daily.  Dispense: 90 capsule; Refill: 3  -     Cholecalciferol 125 MCG (5000 UT) tablet; Take 1 tablet by mouth Daily.  Dispense: 90 each; Refill: 2  -     furosemide (LASIX) 20 MG tablet; Take 1 tablet by mouth Daily for 360 days.  Dispense: 90 tablet; Refill: 3  -     omeprazole (priLOSEC) 20 MG capsule; Take 1 capsule by mouth Daily.  Dispense: 90 capsule; Refill: 3  -     tamsulosin (FLOMAX) 0.4 MG capsule 24 hr capsule; Take 1 capsule by mouth Daily.  Dispense: 90 capsule; Refill: 3  -     fluticasone (FLONASE) 50 MCG/ACT nasal spray; 2 sprays by Each Nare route Daily.  Dispense: 16 g; Refill: 2  -     CBC & Differential; Future  -     Comprehensive Metabolic Panel; Future  -     Lipid Panel; Future  -     PSA Screen; Future  -     Lipid Panel; Future  -     Comprehensive Metabolic Panel; Future    6. Mediastinal mass  -     aspirin 81 MG EC tablet; Take 1 tablet by mouth Daily.  Dispense: 90 tablet; Refill: 3  -     Discontinue: atorvastatin (LIPITOR) 40 MG tablet; Take 1 tablet by mouth Daily.  Dispense: 90 tablet; Refill: 3  -     benazepril (LOTENSIN) 20 MG tablet; Take 1 tablet by mouth Daily.  Dispense: 90 tablet; Refill: 3  -     carvedilol CR (Coreg CR) 40 MG 24 hr capsule; Take 1 capsule by mouth Daily.  Dispense: 90 capsule; Refill: 3  -     Cholecalciferol 125 MCG (5000 UT) tablet; Take 1 tablet by mouth Daily.  Dispense: 90 each; Refill: 2  -     furosemide (LASIX) 20 MG tablet; Take 1  tablet by mouth Daily for 360 days.  Dispense: 90 tablet; Refill: 3  -     omeprazole (priLOSEC) 20 MG capsule; Take 1 capsule by mouth Daily.  Dispense: 90 capsule; Refill: 3  -     tamsulosin (FLOMAX) 0.4 MG capsule 24 hr capsule; Take 1 capsule by mouth Daily.  Dispense: 90 capsule; Refill: 3  -     fluticasone (FLONASE) 50 MCG/ACT nasal spray; 2 sprays by Each Nare route Daily.  Dispense: 16 g; Refill: 2  -     CBC & Differential; Future  -     Comprehensive Metabolic Panel; Future  -     Lipid Panel; Future  -     PSA Screen; Future  -     Lipid Panel; Future  -     Comprehensive Metabolic Panel; Future    7. Edema, unspecified type  -     aspirin 81 MG EC tablet; Take 1 tablet by mouth Daily.  Dispense: 90 tablet; Refill: 3  -     Discontinue: atorvastatin (LIPITOR) 40 MG tablet; Take 1 tablet by mouth Daily.  Dispense: 90 tablet; Refill: 3  -     benazepril (LOTENSIN) 20 MG tablet; Take 1 tablet by mouth Daily.  Dispense: 90 tablet; Refill: 3  -     carvedilol CR (Coreg CR) 40 MG 24 hr capsule; Take 1 capsule by mouth Daily.  Dispense: 90 capsule; Refill: 3  -     Cholecalciferol 125 MCG (5000 UT) tablet; Take 1 tablet by mouth Daily.  Dispense: 90 each; Refill: 2  -     furosemide (LASIX) 20 MG tablet; Take 1 tablet by mouth Daily for 360 days.  Dispense: 90 tablet; Refill: 3  -     omeprazole (priLOSEC) 20 MG capsule; Take 1 capsule by mouth Daily.  Dispense: 90 capsule; Refill: 3  -     tamsulosin (FLOMAX) 0.4 MG capsule 24 hr capsule; Take 1 capsule by mouth Daily.  Dispense: 90 capsule; Refill: 3  -     fluticasone (FLONASE) 50 MCG/ACT nasal spray; 2 sprays by Each Nare route Daily.  Dispense: 16 g; Refill: 2  -     CBC & Differential; Future  -     Comprehensive Metabolic Panel; Future  -     Lipid Panel; Future  -     PSA Screen; Future  -     Lipid Panel; Future  -     Comprehensive Metabolic Panel; Future    8. Benign prostatic hyperplasia without lower urinary tract symptoms  -     aspirin 81 MG EC  tablet; Take 1 tablet by mouth Daily.  Dispense: 90 tablet; Refill: 3  -     Discontinue: atorvastatin (LIPITOR) 40 MG tablet; Take 1 tablet by mouth Daily.  Dispense: 90 tablet; Refill: 3  -     benazepril (LOTENSIN) 20 MG tablet; Take 1 tablet by mouth Daily.  Dispense: 90 tablet; Refill: 3  -     carvedilol CR (Coreg CR) 40 MG 24 hr capsule; Take 1 capsule by mouth Daily.  Dispense: 90 capsule; Refill: 3  -     Cholecalciferol 125 MCG (5000 UT) tablet; Take 1 tablet by mouth Daily.  Dispense: 90 each; Refill: 2  -     furosemide (LASIX) 20 MG tablet; Take 1 tablet by mouth Daily for 360 days.  Dispense: 90 tablet; Refill: 3  -     omeprazole (priLOSEC) 20 MG capsule; Take 1 capsule by mouth Daily.  Dispense: 90 capsule; Refill: 3  -     tamsulosin (FLOMAX) 0.4 MG capsule 24 hr capsule; Take 1 capsule by mouth Daily.  Dispense: 90 capsule; Refill: 3  -     fluticasone (FLONASE) 50 MCG/ACT nasal spray; 2 sprays by Each Nare route Daily.  Dispense: 16 g; Refill: 2  -     CBC & Differential; Future  -     Comprehensive Metabolic Panel; Future  -     Lipid Panel; Future  -     PSA Screen; Future  -     Lipid Panel; Future  -     Comprehensive Metabolic Panel; Future    9. Gastroesophageal reflux disease without esophagitis  -     aspirin 81 MG EC tablet; Take 1 tablet by mouth Daily.  Dispense: 90 tablet; Refill: 3  -     Discontinue: atorvastatin (LIPITOR) 40 MG tablet; Take 1 tablet by mouth Daily.  Dispense: 90 tablet; Refill: 3  -     benazepril (LOTENSIN) 20 MG tablet; Take 1 tablet by mouth Daily.  Dispense: 90 tablet; Refill: 3  -     carvedilol CR (Coreg CR) 40 MG 24 hr capsule; Take 1 capsule by mouth Daily.  Dispense: 90 capsule; Refill: 3  -     Cholecalciferol 125 MCG (5000 UT) tablet; Take 1 tablet by mouth Daily.  Dispense: 90 each; Refill: 2  -     furosemide (LASIX) 20 MG tablet; Take 1 tablet by mouth Daily for 360 days.  Dispense: 90 tablet; Refill: 3  -     omeprazole (priLOSEC) 20 MG capsule; Take  1 capsule by mouth Daily.  Dispense: 90 capsule; Refill: 3  -     tamsulosin (FLOMAX) 0.4 MG capsule 24 hr capsule; Take 1 capsule by mouth Daily.  Dispense: 90 capsule; Refill: 3  -     fluticasone (FLONASE) 50 MCG/ACT nasal spray; 2 sprays by Each Nare route Daily.  Dispense: 16 g; Refill: 2  -     CBC & Differential; Future  -     Comprehensive Metabolic Panel; Future  -     Lipid Panel; Future  -     PSA Screen; Future  -     Lipid Panel; Future  -     Comprehensive Metabolic Panel; Future    10. Hyperlipidemia, unspecified hyperlipidemia type  -     aspirin 81 MG EC tablet; Take 1 tablet by mouth Daily.  Dispense: 90 tablet; Refill: 3  -     Discontinue: atorvastatin (LIPITOR) 40 MG tablet; Take 1 tablet by mouth Daily.  Dispense: 90 tablet; Refill: 3  -     benazepril (LOTENSIN) 20 MG tablet; Take 1 tablet by mouth Daily.  Dispense: 90 tablet; Refill: 3  -     carvedilol CR (Coreg CR) 40 MG 24 hr capsule; Take 1 capsule by mouth Daily.  Dispense: 90 capsule; Refill: 3  -     Cholecalciferol 125 MCG (5000 UT) tablet; Take 1 tablet by mouth Daily.  Dispense: 90 each; Refill: 2  -     furosemide (LASIX) 20 MG tablet; Take 1 tablet by mouth Daily for 360 days.  Dispense: 90 tablet; Refill: 3  -     omeprazole (priLOSEC) 20 MG capsule; Take 1 capsule by mouth Daily.  Dispense: 90 capsule; Refill: 3  -     tamsulosin (FLOMAX) 0.4 MG capsule 24 hr capsule; Take 1 capsule by mouth Daily.  Dispense: 90 capsule; Refill: 3  -     fluticasone (FLONASE) 50 MCG/ACT nasal spray; 2 sprays by Each Nare route Daily.  Dispense: 16 g; Refill: 2  -     CBC & Differential; Future  -     Comprehensive Metabolic Panel; Future  -     Lipid Panel; Future  -     PSA Screen; Future  -     Lipid Panel; Future  -     Comprehensive Metabolic Panel; Future    11. Pure hypercholesterolemia  -     aspirin 81 MG EC tablet; Take 1 tablet by mouth Daily.  Dispense: 90 tablet; Refill: 3  -     Discontinue: atorvastatin (LIPITOR) 40 MG tablet; Take  1 tablet by mouth Daily.  Dispense: 90 tablet; Refill: 3  -     benazepril (LOTENSIN) 20 MG tablet; Take 1 tablet by mouth Daily.  Dispense: 90 tablet; Refill: 3  -     carvedilol CR (Coreg CR) 40 MG 24 hr capsule; Take 1 capsule by mouth Daily.  Dispense: 90 capsule; Refill: 3  -     Cholecalciferol 125 MCG (5000 UT) tablet; Take 1 tablet by mouth Daily.  Dispense: 90 each; Refill: 2  -     furosemide (LASIX) 20 MG tablet; Take 1 tablet by mouth Daily for 360 days.  Dispense: 90 tablet; Refill: 3  -     omeprazole (priLOSEC) 20 MG capsule; Take 1 capsule by mouth Daily.  Dispense: 90 capsule; Refill: 3  -     tamsulosin (FLOMAX) 0.4 MG capsule 24 hr capsule; Take 1 capsule by mouth Daily.  Dispense: 90 capsule; Refill: 3  -     fluticasone (FLONASE) 50 MCG/ACT nasal spray; 2 sprays by Each Nare route Daily.  Dispense: 16 g; Refill: 2  -     CBC & Differential; Future  -     Comprehensive Metabolic Panel; Future  -     Lipid Panel; Future  -     PSA Screen; Future  -     Lipid Panel; Future  -     Comprehensive Metabolic Panel; Future    12. Allergic rhinitis due to other allergic trigger, unspecified seasonality  -     aspirin 81 MG EC tablet; Take 1 tablet by mouth Daily.  Dispense: 90 tablet; Refill: 3  -     Discontinue: atorvastatin (LIPITOR) 40 MG tablet; Take 1 tablet by mouth Daily.  Dispense: 90 tablet; Refill: 3  -     benazepril (LOTENSIN) 20 MG tablet; Take 1 tablet by mouth Daily.  Dispense: 90 tablet; Refill: 3  -     carvedilol CR (Coreg CR) 40 MG 24 hr capsule; Take 1 capsule by mouth Daily.  Dispense: 90 capsule; Refill: 3  -     Cholecalciferol 125 MCG (5000 UT) tablet; Take 1 tablet by mouth Daily.  Dispense: 90 each; Refill: 2  -     furosemide (LASIX) 20 MG tablet; Take 1 tablet by mouth Daily for 360 days.  Dispense: 90 tablet; Refill: 3  -     omeprazole (priLOSEC) 20 MG capsule; Take 1 capsule by mouth Daily.  Dispense: 90 capsule; Refill: 3  -     tamsulosin (FLOMAX) 0.4 MG capsule 24 hr  capsule; Take 1 capsule by mouth Daily.  Dispense: 90 capsule; Refill: 3  -     fluticasone (FLONASE) 50 MCG/ACT nasal spray; 2 sprays by Each Nare route Daily.  Dispense: 16 g; Refill: 2  -     CBC & Differential; Future  -     Comprehensive Metabolic Panel; Future  -     Lipid Panel; Future  -     PSA Screen; Future  -     Lipid Panel; Future  -     Comprehensive Metabolic Panel; Future    13. Diaphragmatic hernia without obstruction or gangrene  -     aspirin 81 MG EC tablet; Take 1 tablet by mouth Daily.  Dispense: 90 tablet; Refill: 3  -     Discontinue: atorvastatin (LIPITOR) 40 MG tablet; Take 1 tablet by mouth Daily.  Dispense: 90 tablet; Refill: 3  -     benazepril (LOTENSIN) 20 MG tablet; Take 1 tablet by mouth Daily.  Dispense: 90 tablet; Refill: 3  -     carvedilol CR (Coreg CR) 40 MG 24 hr capsule; Take 1 capsule by mouth Daily.  Dispense: 90 capsule; Refill: 3  -     Cholecalciferol 125 MCG (5000 UT) tablet; Take 1 tablet by mouth Daily.  Dispense: 90 each; Refill: 2  -     furosemide (LASIX) 20 MG tablet; Take 1 tablet by mouth Daily for 360 days.  Dispense: 90 tablet; Refill: 3  -     omeprazole (priLOSEC) 20 MG capsule; Take 1 capsule by mouth Daily.  Dispense: 90 capsule; Refill: 3  -     tamsulosin (FLOMAX) 0.4 MG capsule 24 hr capsule; Take 1 capsule by mouth Daily.  Dispense: 90 capsule; Refill: 3  -     fluticasone (FLONASE) 50 MCG/ACT nasal spray; 2 sprays by Each Nare route Daily.  Dispense: 16 g; Refill: 2  -     CBC & Differential; Future  -     Comprehensive Metabolic Panel; Future  -     Lipid Panel; Future  -     PSA Screen; Future  -     Lipid Panel; Future  -     Comprehensive Metabolic Panel; Future    14. Encounter for screening for malignant neoplasm of colon  -     Ambulatory Referral For Screening Colonoscopy    Other orders  -     rosuvastatin (Crestor) 40 MG tablet; Take 1 tablet by mouth Daily.  Dispense: 90 tablet; Refill: 3  -     doxycycline (VIBRAMYCIN) 100 MG capsule; Take  1 capsule by mouth 2 (Two) Times a Day.  Dispense: 20 capsule; Refill: 1    Annual exam, preventive measures discussed, he wears a seatbelt, he does not smoke, does not drink heavily, encouraged him to continue exercise walking on a regular basis and to monitor his diet with low-carb Mediterranean type diet if possible discussed July 9, 2024    Upper respiratory symptoms, flulike symptoms June 18, 2024, was treated for     cellulitis of the right lower leg with doxycycline at that time by [facility] nurse practitioner in our office, seem to help,, will do 1 more round of antibiotics July 9, 2024 prescription sent in    Htn-----cont lisinopril 20 mg qd, Coreg extended release 40 mg daily, furosemide 20 mg daily, consideration of increasing lisinopril dose to 40 patient will monitor blood pressures at home on a regular basis,    nuclear medicine stress test, May 31, 2023, -----normal ejection fraction normal perfusion study ----no evidence of ischemia ,  low risk study    Le edema , chronic venous insufficiency---- cont lasix 20 mg qd ----venous insufficiency testing, April 28, 2023 shows no evidence of DVT in either leg,---discussed rx with ace and or compression and diuretics ---added Lotensin HCTZ, did not tolerate the compression devices through the lymphedema clinic,    Darrel , discussed wgt loss July 9, 2024 as above    Bph , cont flomax 0,4 mg qhs,     Gerd , cont omeprazole 20 mg qd     Vitamin D deficiency, continues vitamin D replacement 5000 units daily    Hyperlipidemia continues Crestor 40 mg daily discussed goals with atherosclerotic disease seen on CT scans consideration for aggressive LDL lowering July 9, 2024, consider Zetia versus Repatha, LDL currently at 83 July 1, 2024, with changing to Crestor will wait and see what his next labs look like before adding Zetia, discussed July 9, 2024    Quit tobacco 2017--several cigarettes a day on average, low-dose CT scan for lung cancer screening, --- shows  tiny likely benign lung nodules recommended continued annual screening,   there is biapical scarring without active pulmonary process ,   there is an incidental rounded hypodense lesion in the anterior mediastinal area differential diagnosis includes a thymoma versus a thymic cyst no overly suspicious features to suggest cancer or malignancy and outside CT would be helpful and nonemergent cardiothoracic surgical consultation could be considered, he does have aortic and coronary atherosclerotic disease, -----patient did go see Dr. Harsh Martinez at Dr. Sterling's office in [place], PET scan CT scan performed October 16, 2023 showed a 2.2 x 1.7 cm left anterior mediastinal mass consistent with a cyst photopenic on PET scan,   no hypermetabolic lymphadenopathy or suspicious activity in the mediastinum or hilum and no suspicious activity in the neck abdomen or pelvis--------wanted to take it out with robotic thoracotomy ? 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  Patient was given instructions and counseling regarding his condition or for health maintenance advice. Please see specific information pulled into the AVS if appropriate.

## 2024-07-15 ENCOUNTER — OFFICE VISIT (OUTPATIENT)
Dept: SURGERY | Facility: CLINIC | Age: 59
End: 2024-07-15
Payer: COMMERCIAL

## 2024-07-15 VITALS — DIASTOLIC BLOOD PRESSURE: 78 MMHG | OXYGEN SATURATION: 97 % | SYSTOLIC BLOOD PRESSURE: 136 MMHG | HEART RATE: 88 BPM

## 2024-07-15 DIAGNOSIS — J98.59 MEDIASTINAL MASS: Primary | ICD-10-CM

## 2024-07-15 PROCEDURE — 99214 OFFICE O/P EST MOD 30 MIN: CPT | Performed by: NURSE PRACTITIONER

## 2024-07-17 NOTE — PROGRESS NOTES
"Chief Complaint  Follow-up, mediastinal mass/cyst    Subjective        Jose Luis Casas presents to [facility] THORACIC SURGERY  History of Present Illness    Mr. Casas is a pleasant 58-year-old gentleman who is seen today in follow-up for continued surveillance of a mediastinal mass.  He has been established with our practice with Dr. Martinez since October 2023.  He underwent PET imaging at that time and the 2.2 cm anterior mediastinal mass was photopenic, consistent with cyst.  Alpha-fetoprotein, LDH and Beta HCG were ordered and negative.  He continues to undergo surveillance with CT chest.  He has no new pulmonary complaints.  He continues to work as a realtor.  He presents today feeling well.        Objective   Vital Signs:  /78 (BP Location: Left arm, Patient Position: Sitting, Cuff Size: Adult)   Pulse 88   SpO2 97%   Estimated body mass index is 33.29 kg/m² as calculated from the following:    Height as of 7/9/24: 177.8 cm (70\").    Weight as of 7/9/24: 105 kg (232 lb).               Physical Exam  Constitutional:       General: He is not in acute distress.     Appearance: Normal appearance. He is not ill-appearing.   HENT:      Head: Normocephalic and atraumatic.   Cardiovascular:      Rate and Rhythm: Normal rate.   Pulmonary:      Effort: Pulmonary effort is normal.   Musculoskeletal:         General: Normal range of motion.      Cervical back: Normal range of motion and neck supple.   Lymphadenopathy:      Cervical: No cervical adenopathy.   Skin:     General: Skin is warm and dry.   Neurological:      General: No focal deficit present.      Mental Status: He is alert.      Motor: No weakness.   Psychiatric:         Mood and Affect: Mood normal.         Thought Content: Thought content normal.        Result Review :            CT chest 7/3/2024: Stable cystic lesion in the anterior mediastinum, measuring 2.2 cm as before.  No mediastinal or hilar lymphadenopathy.  Tiny pulmonary " nodules are stable in the right apex and within the lingula.  No acute intrathoracic process.  Imaging reviewed independently.         Assessment and Plan     Diagnoses and all orders for this visit:    1. Mediastinal mass (Primary)    Mr. 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  I have recommended a CT chest in 1 year for documented stability.  Results reviewed with patient today and he is in agreement with this plan.       I spent 30 minutes caring for Jose Luis on this date of service. This time includes time spent by me in the following activities:preparing for the visit, reviewing tests, obtaining and/or reviewing a separately obtained history, performing a medically appropriate examination and/or evaluation , counseling and educating the patient/family/caregiver, ordering medications, tests, or procedures, documenting information in the medical record, and independently interpreting results and communicating that information with the patient/family/caregiver  Follow Up     Return in about 6 months (around 1/15/2025) for Next scheduled follow up.  Patient was given instructions and counseling regarding his condition or for health maintenance advice. Please see specific information pulled into the AVS if appropriate.

## 2024-07-18 DIAGNOSIS — J98.59 MEDIASTINAL MASS: Primary | ICD-10-CM

## 2024-07-23 ENCOUNTER — TELEPHONE (OUTPATIENT)
Dept: GASTROENTEROLOGY | Facility: CLINIC | Age: 59
End: 2024-07-23
Payer: COMMERCIAL

## 2024-07-23 NOTE — TELEPHONE ENCOUNTER
Contacted the patient and verified his information then advised that I was calling due to the referral we received from Dr. Florez about getting his colonoscopy scheduled. The patient verbalized understanding and has been advised that he is due this year for his 3 year colon recall, patient stated that he wasn't having any other symptoms aside from being due. I verbalized understanding and explained the recall screening call process and have scheduled the patient for the first available recall screening call with our office.

## 2024-07-29 ENCOUNTER — PREP FOR SURGERY (OUTPATIENT)
Dept: OTHER | Facility: HOSPITAL | Age: 59
End: 2024-07-29
Payer: COMMERCIAL

## 2024-07-29 ENCOUNTER — CLINICAL SUPPORT (OUTPATIENT)
Dept: GASTROENTEROLOGY | Facility: CLINIC | Age: 59
End: 2024-07-29
Payer: COMMERCIAL

## 2024-07-29 DIAGNOSIS — Z12.11 SCREENING FOR MALIGNANT NEOPLASM OF COLON: Primary | ICD-10-CM

## 2024-07-29 DIAGNOSIS — Z86.010 HISTORY OF COLON POLYPS: ICD-10-CM

## 2024-07-29 PROBLEM — Z86.0100 HISTORY OF COLON POLYPS: Status: ACTIVE | Noted: 2024-07-29

## 2024-07-29 RX ORDER — VIBEGRON 75 MG/1
1 TABLET, FILM COATED ORAL DAILY
COMMUNITY
Start: 2024-07-17

## 2024-07-29 RX ORDER — SODIUM, POTASSIUM,MAG SULFATES 17.5-3.13G
1 SOLUTION, RECONSTITUTED, ORAL ORAL EVERY 12 HOURS
Qty: 354 ML | Refills: 0 | Status: SHIPPED | OUTPATIENT
Start: 2024-07-29

## 2024-07-29 NOTE — PROGRESS NOTES
Jose Luis Casas  1965  58 y.o.    Reason for call: Screening Colonoscopy  Prep prescribed: Suprep  Prep instructions reviewed with patient and sent to patient via [employer]  Is the patient currently on any injectable or oral medications for weight loss or diabetes? No  Clearance needed? Yes  If yes, what clearance is needed? Cardiology  Clearance has been requested from Dr Scott Gonsales  The patient has been scheduled for: Colonoscopy  After your procedure, you will be contacted with results. Please confirm the best phone # to reach the patient: [phone]  Family history of colon cancer? Yes  If yes, indicate relative: Grandma  Tentative Procedure Date: 09/12/2024  Family History   Problem Relation Age of Onset    Dementia Mother     Epilepsy Mother     Hypertension Mother     Hyperlipidemia Mother     Heart disease Father     Diabetes Father     Hypertension Father     Hyperlipidemia Father     Breast cancer Maternal Grandmother     Breast cancer Paternal Grandmother     Hypertension Paternal Grandmother     Hyperlipidemia Paternal Grandmother     Other Paternal Grandmother         Colon cancer    Colon cancer Paternal Grandmother     Stroke Other         Paternal grandparent    Malig Hyperthermia Neg Hx      Past Medical History:   Diagnosis Date    Colitis     Degenerative disc disease, lumbar 03/16/2016    Diverticulosis     Edema, lower extremity     Essential hypertension 03/16/2016    GERD (gastroesophageal reflux disease)     Hiatal hernia     High cholesterol 02/05/2014    History of COVID-19 02/2022    Hydrocele     Screening PSA (prostate specific antigen) 10/28/2016    Tinnitus, bilateral     Weakness of left lower extremity 03/16/2016     No Known Allergies  Past Surgical History:   Procedure Laterality Date    COLONOSCOPY  01/13/2017    COLONOSCOPY N/A 06/28/2021    Procedure: COLONOSCOPY;  Surgeon: Antonette Browning MD;  Location: [facility] ENDOSCOPY;  Service: Gastroenterology;  Laterality:  N/A;  COLON POLYPS    HYDROCELECTOMY  2015    x2    TONSILLECTOMY      UPPER GASTROINTESTINAL ENDOSCOPY       Social History     Socioeconomic History    Marital status:     Number of children: 2   Tobacco Use    Smoking status: Former     Current packs/day: 0.00     Average packs/day: 0.3 packs/day for 30.0 years (7.5 ttl pk-yrs)     Types: Cigarettes     Start date: 1987     Quit date: 2017     Years since quittin.5    Smokeless tobacco: Never    Tobacco comments:     Was never heavy (so 0/packs per day) but rather a social smoker.   Vaping Use    Vaping status: Former    Substances: Nicotine    Devices: Disposable, Pre-filled pod   Substance and Sexual Activity    Alcohol use: Yes     Comment: When out for dinner, with friends, wine or cocktail.    Drug use: Never    Sexual activity: Yes     Partners: Female     Birth control/protection: None       Current Outpatient Medications:     aspirin 81 MG EC tablet, Take 1 tablet by mouth Daily., Disp: 90 tablet, Rfl: 3    benazepril (LOTENSIN) 20 MG tablet, Take 1 tablet by mouth Daily., Disp: 90 tablet, Rfl: 3    carvedilol CR (Coreg CR) 40 MG 24 hr capsule, Take 1 capsule by mouth Daily., Disp: 90 capsule, Rfl: 3    Cholecalciferol 125 MCG (5000 UT) tablet, Take 1 tablet by mouth Daily., Disp: 90 each, Rfl: 2    doxycycline (VIBRAMYCIN) 100 MG capsule, Take 1 capsule by mouth 2 (Two) Times a Day., Disp: 20 capsule, Rfl: 1    fluticasone (FLONASE) 50 MCG/ACT nasal spray, 2 sprays by Each Nare route Daily., Disp: 16 g, Rfl: 2    furosemide (LASIX) 20 MG tablet, Take 1 tablet by mouth Daily for 360 days., Disp: 90 tablet, Rfl: 3    Gemtesa 75 MG tablet, Take 1 tablet by mouth Daily., Disp: , Rfl:     omeprazole (priLOSEC) 20 MG capsule, Take 1 capsule by mouth Daily., Disp: 90 capsule, Rfl: 3    rosuvastatin (Crestor) 40 MG tablet, Take 1 tablet by mouth Daily., Disp: 90 tablet, Rfl: 3    tamsulosin (FLOMAX) 0.4 MG capsule 24 hr capsule, Take 1 capsule  by mouth Daily., Disp: 90 capsule, Rfl: 3

## 2024-07-31 RX ORDER — DOXYCYCLINE HYCLATE 100 MG/1
100 CAPSULE ORAL 2 TIMES DAILY
Qty: 60 CAPSULE | Refills: 1 | Status: SHIPPED | OUTPATIENT
Start: 2024-07-31

## 2024-08-26 ENCOUNTER — TELEPHONE (OUTPATIENT)
Dept: GASTROENTEROLOGY | Facility: CLINIC | Age: 59
End: 2024-08-26
Payer: COMMERCIAL

## 2024-08-26 ENCOUNTER — TELEPHONE (OUTPATIENT)
Dept: CARDIOLOGY | Facility: CLINIC | Age: 59
End: 2024-08-26
Payer: COMMERCIAL

## 2024-08-26 NOTE — TELEPHONE ENCOUNTER
Caller: SAMY    Relationship to patient: [facility]UR[facility]    Best call back number: [phone] OPT[facility] 3    Patient is needing:  SUBMITTED CLEARENCE ON JULY 29TH AND THEY STILL HAVENT RECIEVED ANYTHING. PT IS SCHEDULED FOR SURGERY SEP 12TH. FAX#- [phone]

## 2024-08-27 NOTE — TELEPHONE ENCOUNTER
Can we request the patient's PCP to fill out the clearance?  The patient does not have any active cardiac conditions I am treating.  His echo was normal without CHF, his stress test was normal, his Holter was normal, and he has severe obstructive sleep apnea that is likely causing the lower extremity swelling.  It appears he has been lost to follow-up with me, but there is not really much else that I would recommend since it does not appear that he has a cardiac condition that I am treating.    I would need to see him in a follow-up since it has been more than a year since I saw him last before I can

## 2024-08-28 NOTE — TELEPHONE ENCOUNTER
Per Cardiology, no active conditions pt is treating, he has not seen pt in over a year. He states that clearance needs to come from his pcp. Please advise if that is needed at this time.

## 2024-08-28 NOTE — TELEPHONE ENCOUNTER
8/28/24      Spoke with Josi and at the [facility] gastro and neurology office and informed her that per Dr. Gonsales that this cardiac clearance is something that they will have to obtain and reach out to get from the patient primary care doctor office due to The patient does not have any active cardiac conditions I am treating.  His echo was normal without CHF, his stress test was normal, his Holter was normal, and he has severe obstructive sleep apnea that is likely causing the lower extremity swelling.  It appears he has been lost to follow-up with me, but there is not really much else that I would recommend since it does not appear that he has a cardiac condition that I am treating.     I would need to see him in a follow-up since it has been more than a year since I saw him last before he can do it. And she voiced an understanding and stated that she will reach out to the pcp office to get the required documents.

## 2024-09-11 ENCOUNTER — ANESTHESIA EVENT (OUTPATIENT)
Dept: GASTROENTEROLOGY | Facility: HOSPITAL | Age: 59
End: 2024-09-11
Payer: COMMERCIAL

## 2024-09-11 NOTE — ANESTHESIA PREPROCEDURE EVALUATION
Anesthesia Evaluation     Patient summary reviewed and Nursing notes reviewed   NPO Solid Status: > 8 hours  NPO Liquid Status: > 8 hours           Airway   Mallampati: I  TM distance: >3 FB  Neck ROM: full  No difficulty expected  Dental - normal exam     Pulmonary - normal exam    breath sounds clear to auscultation  (+) a smoker (former) Former,  Cardiovascular - normal exam  Exercise tolerance: good (4-7 METS)    ECG reviewed  Patient on routine beta blocker  Rhythm: regular  Rate: normal    (+) hypertension well controlled 2 medications or greater, CAD, hyperlipidemia      Neuro/Psych  GI/Hepatic/Renal/Endo    (+) obesity, hiatal hernia, GERD well controlled    Musculoskeletal     Abdominal    Substance History      OB/GYN          Other   arthritis,     ROS/Med Hx Other:   Echo 5/15/2023:  Left ventricular systolic function is normal. Calculated left ventricular EF = 53.2%  Left ventricular diastolic function was normal.  There is mild calcification of the aortic valve without any significant stenosis or regurgitation present.    EKG 5/18/2023:  HR 70bpm  Sinus rhythm  Borderline ST elevation, lateral leads  Baseline wander in lead(s), V1, V4    Cardiac clearance requested by Dr. Browning's office on 7/29/2024 to Dr. Gonsales, cardiologist in [place]. Cardiac standpoint normal per Dr. Gonsales' note on 8/27/2024 and would need to follow-up with him.    Cards note per Dr. Gonsales on 08/27 24: Can we request the patient's PCP to fill out the clearance?  The patient does not have any active cardiac conditions I am treating.  His echo was normal without CHF, his stress test was normal, his Holter was normal, and he has severe obstructive sleep apnea that is likely causing the lower extremity swelling.  It appears he has been lost to follow-up with me, but there is not really much else that I would recommend since it does not appear that he has a cardiac condition that I am treating.                            Anesthesia Plan    ASA 2     general   total IV anesthesia  (Total IV Anesthesia    Patient understands anesthesia not responsible for dental damage.  )  intravenous induction     Anesthetic plan, risks, benefits, and alternatives have been provided, discussed and informed consent has been obtained with: patient and spouse/significant other.  Pre-procedure education provided  Plan discussed with CRNA.        CODE STATUS:

## 2024-09-12 ENCOUNTER — ANESTHESIA (OUTPATIENT)
Dept: GASTROENTEROLOGY | Facility: HOSPITAL | Age: 59
End: 2024-09-12
Payer: COMMERCIAL

## 2024-09-12 ENCOUNTER — HOSPITAL ENCOUNTER (OUTPATIENT)
Facility: HOSPITAL | Age: 59
Setting detail: HOSPITAL OUTPATIENT SURGERY
Discharge: HOME | End: 2024-09-12
Attending: INTERNAL MEDICINE | Admitting: INTERNAL MEDICINE
Payer: COMMERCIAL

## 2024-09-12 VITALS
BODY MASS INDEX: 32.83 KG/M2 | SYSTOLIC BLOOD PRESSURE: 171 MMHG | WEIGHT: 228.84 LBS | DIASTOLIC BLOOD PRESSURE: 99 MMHG | HEART RATE: 76 BPM | OXYGEN SATURATION: 98 % | RESPIRATION RATE: 11 BRPM | TEMPERATURE: 96.8 F

## 2024-09-12 DIAGNOSIS — Z12.11 SCREENING FOR MALIGNANT NEOPLASM OF COLON: ICD-10-CM

## 2024-09-12 DIAGNOSIS — Z86.010 HISTORY OF COLON POLYPS: ICD-10-CM

## 2024-09-12 PROCEDURE — 25810000003 LACTATED RINGERS PER 1000 ML: Performed by: NURSE ANESTHETIST, CERTIFIED REGISTERED

## 2024-09-12 PROCEDURE — 25010000002 PROPOFOL 10 MG/ML EMULSION: Performed by: NURSE ANESTHETIST, CERTIFIED REGISTERED

## 2024-09-12 PROCEDURE — 45385 COLONOSCOPY W/LESION REMOVAL: CPT | Performed by: INTERNAL MEDICINE

## 2024-09-12 PROCEDURE — 88305 TISSUE EXAM BY PATHOLOGIST: CPT | Performed by: INTERNAL MEDICINE

## 2024-09-12 RX ORDER — SODIUM CHLORIDE, SODIUM LACTATE, POTASSIUM CHLORIDE, CALCIUM CHLORIDE 600; 310; 30; 20 MG/100ML; MG/100ML; MG/100ML; MG/100ML
INJECTION, SOLUTION INTRAVENOUS CONTINUOUS PRN
Status: DISCONTINUED | OUTPATIENT
Start: 2024-09-12 | End: 2024-09-12 | Stop reason: SURG

## 2024-09-12 RX ORDER — SODIUM CHLORIDE, SODIUM LACTATE, POTASSIUM CHLORIDE, CALCIUM CHLORIDE 600; 310; 30; 20 MG/100ML; MG/100ML; MG/100ML; MG/100ML
30 INJECTION, SOLUTION INTRAVENOUS CONTINUOUS
Status: DISCONTINUED | OUTPATIENT
Start: 2024-09-12 | End: 2024-09-12 | Stop reason: HOSPADM

## 2024-09-12 RX ORDER — PROPOFOL 10 MG/ML
VIAL (ML) INTRAVENOUS AS NEEDED
Status: DISCONTINUED | OUTPATIENT
Start: 2024-09-12 | End: 2024-09-12 | Stop reason: SURG

## 2024-09-12 RX ORDER — LIDOCAINE HYDROCHLORIDE 20 MG/ML
INJECTION, SOLUTION EPIDURAL; INFILTRATION; INTRACAUDAL; PERINEURAL AS NEEDED
Status: DISCONTINUED | OUTPATIENT
Start: 2024-09-12 | End: 2024-09-12 | Stop reason: SURG

## 2024-09-12 RX ADMIN — PROPOFOL 250 MCG/KG/MIN: 10 INJECTION, EMULSION INTRAVENOUS at 08:01

## 2024-09-12 RX ADMIN — PROPOFOL 50 MG: 10 INJECTION, EMULSION INTRAVENOUS at 08:01

## 2024-09-12 RX ADMIN — PROPOFOL 30 MG: 10 INJECTION, EMULSION INTRAVENOUS at 08:05

## 2024-09-12 RX ADMIN — SODIUM CHLORIDE, POTASSIUM CHLORIDE, SODIUM LACTATE AND CALCIUM CHLORIDE: 600; 310; 30; 20 INJECTION, SOLUTION INTRAVENOUS at 07:59

## 2024-09-12 RX ADMIN — LIDOCAINE HYDROCHLORIDE 100 MG: 20 INJECTION, SOLUTION INTRAVENOUS at 08:01

## 2024-09-12 RX ADMIN — SODIUM CHLORIDE, POTASSIUM CHLORIDE, SODIUM LACTATE AND CALCIUM CHLORIDE 30 ML/HR: 600; 310; 30; 20 INJECTION, SOLUTION INTRAVENOUS at 07:04

## 2024-09-12 NOTE — ANESTHESIA POSTPROCEDURE EVALUATION
Patient: Jose Luis Casas    Procedure Summary       Date: 09/12/24 Room / Location: [facility] ENDOSCOPY 1 / [facility] ENDOSCOPY    Anesthesia Start: 0759 Anesthesia Stop: 0823    Procedure: COLONOSCOPY WITH POLYPECTOMIES Diagnosis:       History of colon polyps      Screening for malignant neoplasm of colon      (History of colon polyps [Z86.010])      (Screening for malignant neoplasm of colon [Z12.11])    Surgeons: Antonette Browning MD Provider: Maria Esther Segovia CRNA    Anesthesia Type: general ASA Status: 2            Anesthesia Type: general    Vitals  Vitals Value Taken Time   /80 09/12/24 0823   Temp     Pulse 79 09/12/24 0823   Resp     SpO2 99 % 09/12/24 0823   Vitals shown include unfiled device data.        Post Anesthesia Care and Evaluation    Post-procedure mental status: acceptable.  Pain management: satisfactory to patient    Airway patency: patent  Anesthetic complications: No anesthetic complications    Cardiovascular status: acceptable  Respiratory status: acceptable    Comments: Per chart review

## 2024-09-12 NOTE — H&P
Pre Procedure History & Physical    Chief Complaint:   Surveillance colonoscopy    Subjective     HPI:   57 yo M here for surveillance colonoscopy.    Past Medical History:   Past Medical History:   Diagnosis Date    Colitis     Degenerative disc disease, lumbar 03/16/2016    Diverticulosis     Edema, lower extremity     Elevated cholesterol     Essential hypertension 03/16/2016    GERD (gastroesophageal reflux disease)     Hiatal hernia     High cholesterol 02/05/2014    History of COVID-19 02/2022    Hydrocele     Screening PSA (prostate specific antigen) 10/28/2016    Tinnitus, bilateral     Weakness of left lower extremity 03/16/2016       Past Surgical History:  Past Surgical History:   Procedure Laterality Date    COLONOSCOPY  01/13/2017    COLONOSCOPY N/A 06/28/2021    Procedure: COLONOSCOPY;  Surgeon: Antonette Browning MD;  Location: [facility] ENDOSCOPY;  Service: Gastroenterology;  Laterality: N/A;  COLON POLYPS    HYDROCELECTOMY  2015    x2    TONSILLECTOMY      UPPER GASTROINTESTINAL ENDOSCOPY         Family History:  Family History   Problem Relation Age of Onset    Dementia Mother     Epilepsy Mother     Hypertension Mother     Hyperlipidemia Mother     Heart disease Father     Diabetes Father     Hypertension Father     Hyperlipidemia Father     Breast cancer Maternal Grandmother     Breast cancer Paternal Grandmother     Hypertension Paternal Grandmother     Hyperlipidemia Paternal Grandmother     Other Paternal Grandmother         Colon cancer    Colon cancer Paternal Grandmother     Stroke Other         Paternal grandparent    Malig Hyperthermia Neg Hx        Social History:   reports that he quit smoking about 7 years ago. His smoking use included cigarettes. He started smoking about 37 years ago. He has a 7.5 pack-year smoking history. He has never used smokeless tobacco. He reports current alcohol use. He reports that he does not use drugs.    Medications:   Medications Prior to Admission    Medication Sig Dispense Refill Last Dose    aspirin 81 MG EC tablet Take 1 tablet by mouth Daily. 90 tablet 3 9/11/2024    benazepril (LOTENSIN) 20 MG tablet Take 1 tablet by mouth Daily. 90 tablet 3 9/11/2024    carvedilol CR (Coreg CR) 40 MG 24 hr capsule Take 1 capsule by mouth Daily. 90 capsule 3 9/11/2024    Cholecalciferol 125 MCG (5000 UT) tablet Take 1 tablet by mouth Daily. 90 each 2 9/11/2024    doxycycline (VIBRAMYCIN) 100 MG capsule take 1 capsule by mouth TWICE DAILY 60 capsule 1 9/11/2024    fluticasone (FLONASE) 50 MCG/ACT nasal spray 2 sprays by Each Nare route Daily. 16 g 2 9/11/2024    furosemide (LASIX) 20 MG tablet Take 1 tablet by mouth Daily for 360 days. 90 tablet 3 9/11/2024    Gemtesa 75 MG tablet Take 1 tablet by mouth Daily.   9/11/2024    omeprazole (priLOSEC) 20 MG capsule Take 1 capsule by mouth Daily. 90 capsule 3 9/11/2024    rosuvastatin (Crestor) 40 MG tablet Take 1 tablet by mouth Daily. 90 tablet 3 9/11/2024    tamsulosin (FLOMAX) 0.4 MG capsule 24 hr capsule Take 1 capsule by mouth Daily. 90 capsule 3 9/11/2024       Allergies:  Patient has no known allergies.    ROS:    Pertinent items are noted in HPI     Objective     Blood pressure 158/94, pulse 77, temperature 97.3 °F (36.3 °C), temperature source Temporal, resp. rate 18, weight 104 kg (228 lb 13.4 oz), SpO2 100%.    Physical Exam   Constitutional: Pt is oriented to person, place, and time and well-developed, well-nourished, and in no distress.   Mouth/Throat: Oropharynx is clear and moist.   Neck: Normal range of motion.   Cardiovascular: Normal rate, regular rhythm and normal heart sounds.    Pulmonary/Chest: Effort normal and breath sounds normal.   Abdominal: Soft. Nontender  Skin: Skin is warm and dry.   Psychiatric: Mood, memory, affect and judgment normal.     Assessment & Plan     Diagnosis:  Surveillance colonoscopy    Anticipated Surgical Procedure:  Colonoscopy    The risks, benefits, and alternatives of this  procedure have been discussed with the patient or the responsible party- the patient understands and agrees to proceed.

## 2024-09-13 ENCOUNTER — TELEPHONE (OUTPATIENT)
Dept: GASTROENTEROLOGY | Facility: CLINIC | Age: 59
End: 2024-09-13
Payer: COMMERCIAL

## 2024-09-13 NOTE — TELEPHONE ENCOUNTER
----- Message from Antonette Browning sent at 9/13/2024 11:07 AM EDT -----  Recommend repeat colonoscopy in 5 years for colon polyp surveillance.  Please mail results to patient and PCP.  thanks

## 2024-10-05 DIAGNOSIS — E78.00 PURE HYPERCHOLESTEROLEMIA, UNSPECIFIED: ICD-10-CM

## 2024-10-05 DIAGNOSIS — J30.89 OTHER ALLERGIC RHINITIS: ICD-10-CM

## 2024-10-07 RX ORDER — VIBEGRON 75 MG/1
1 TABLET, FILM COATED ORAL DAILY
Qty: 90 TABLET | Refills: 2 | Status: SHIPPED | OUTPATIENT
Start: 2024-10-07

## 2024-10-08 ENCOUNTER — LAB (OUTPATIENT)
Dept: LAB | Facility: HOSPITAL | Age: 59
End: 2024-10-08
Payer: COMMERCIAL

## 2024-10-08 DIAGNOSIS — J30.89 ALLERGIC RHINITIS DUE TO OTHER ALLERGIC TRIGGER, UNSPECIFIED SEASONALITY: ICD-10-CM

## 2024-10-08 DIAGNOSIS — I10 ESSENTIAL HYPERTENSION: ICD-10-CM

## 2024-10-08 DIAGNOSIS — Z00.00 PHYSICAL EXAM, ANNUAL: ICD-10-CM

## 2024-10-08 DIAGNOSIS — Z12.5 SCREENING PSA (PROSTATE SPECIFIC ANTIGEN): ICD-10-CM

## 2024-10-08 DIAGNOSIS — I25.10 CORONARY ARTERY DISEASE INVOLVING NATIVE CORONARY ARTERY OF NATIVE HEART WITHOUT ANGINA PECTORIS: ICD-10-CM

## 2024-10-08 DIAGNOSIS — E78.5 HYPERLIPIDEMIA, UNSPECIFIED HYPERLIPIDEMIA TYPE: ICD-10-CM

## 2024-10-08 DIAGNOSIS — K21.9 GASTROESOPHAGEAL REFLUX DISEASE WITHOUT ESOPHAGITIS: ICD-10-CM

## 2024-10-08 DIAGNOSIS — R60.9 EDEMA, UNSPECIFIED TYPE: ICD-10-CM

## 2024-10-08 DIAGNOSIS — J98.59 MEDIASTINAL MASS: ICD-10-CM

## 2024-10-08 DIAGNOSIS — E78.00 PURE HYPERCHOLESTEROLEMIA: ICD-10-CM

## 2024-10-08 DIAGNOSIS — I87.2 VENOUS INSUFFICIENCY OF BOTH LOWER EXTREMITIES: ICD-10-CM

## 2024-10-08 DIAGNOSIS — K44.9 DIAPHRAGMATIC HERNIA WITHOUT OBSTRUCTION OR GANGRENE: ICD-10-CM

## 2024-10-08 DIAGNOSIS — N40.0 BENIGN PROSTATIC HYPERPLASIA WITHOUT LOWER URINARY TRACT SYMPTOMS: ICD-10-CM

## 2024-10-08 LAB
ALBUMIN SERPL-MCNC: 4.2 G/DL (ref 3.5–5.2)
ALBUMIN/GLOB SERPL: 1.6 G/DL
ALP SERPL-CCNC: 59 U/L (ref 39–117)
ALT SERPL W P-5'-P-CCNC: 20 U/L (ref 1–41)
ANION GAP SERPL CALCULATED.3IONS-SCNC: 11 MMOL/L (ref 5–15)
AST SERPL-CCNC: 17 U/L (ref 1–40)
BASOPHILS # BLD AUTO: 0.03 10*3/MM3 (ref 0–0.2)
BASOPHILS NFR BLD AUTO: 0.5 % (ref 0–1.5)
BILIRUB SERPL-MCNC: 0.4 MG/DL (ref 0–1.2)
BUN SERPL-MCNC: 10 MG/DL (ref 6–20)
BUN/CREAT SERPL: 10.5 (ref 7–25)
CALCIUM SPEC-SCNC: 10.1 MG/DL (ref 8.6–10.5)
CHLORIDE SERPL-SCNC: 95 MMOL/L (ref 98–107)
CHOLEST SERPL-MCNC: 143 MG/DL (ref 0–200)
CO2 SERPL-SCNC: 26 MMOL/L (ref 22–29)
CREAT SERPL-MCNC: 0.95 MG/DL (ref 0.76–1.27)
DEPRECATED RDW RBC AUTO: 42.8 FL (ref 37–54)
EGFRCR SERPLBLD CKD-EPI 2021: 93.4 ML/MIN/1.73
EOSINOPHIL # BLD AUTO: 0.18 10*3/MM3 (ref 0–0.4)
EOSINOPHIL NFR BLD AUTO: 2.8 % (ref 0.3–6.2)
ERYTHROCYTE [DISTWIDTH] IN BLOOD BY AUTOMATED COUNT: 12.2 % (ref 12.3–15.4)
GLOBULIN UR ELPH-MCNC: 2.6 GM/DL
GLUCOSE SERPL-MCNC: 93 MG/DL (ref 65–99)
HCT VFR BLD AUTO: 42.6 % (ref 37.5–51)
HDLC SERPL-MCNC: 43 MG/DL (ref 40–60)
HGB BLD-MCNC: 14.6 G/DL (ref 13–17.7)
IMM GRANULOCYTES # BLD AUTO: 0.03 10*3/MM3 (ref 0–0.05)
IMM GRANULOCYTES NFR BLD AUTO: 0.5 % (ref 0–0.5)
LDLC SERPL CALC-MCNC: 78 MG/DL (ref 0–100)
LDLC/HDLC SERPL: 1.77 {RATIO}
LYMPHOCYTES # BLD AUTO: 2.36 10*3/MM3 (ref 0.7–3.1)
LYMPHOCYTES NFR BLD AUTO: 36.1 % (ref 19.6–45.3)
MCH RBC QN AUTO: 32.5 PG (ref 26.6–33)
MCHC RBC AUTO-ENTMCNC: 34.3 G/DL (ref 31.5–35.7)
MCV RBC AUTO: 94.9 FL (ref 79–97)
MONOCYTES # BLD AUTO: 0.69 10*3/MM3 (ref 0.1–0.9)
MONOCYTES NFR BLD AUTO: 10.6 % (ref 5–12)
NEUTROPHILS NFR BLD AUTO: 3.24 10*3/MM3 (ref 1.7–7)
NEUTROPHILS NFR BLD AUTO: 49.5 % (ref 42.7–76)
NRBC BLD AUTO-RTO: 0 /100 WBC (ref 0–0.2)
PLATELET # BLD AUTO: 300 10*3/MM3 (ref 140–450)
PMV BLD AUTO: 9.6 FL (ref 6–12)
POTASSIUM SERPL-SCNC: 5.2 MMOL/L (ref 3.5–5.2)
PROT SERPL-MCNC: 6.8 G/DL (ref 6–8.5)
PSA SERPL-MCNC: 2.64 NG/ML (ref 0–4)
RBC # BLD AUTO: 4.49 10*6/MM3 (ref 4.14–5.8)
SODIUM SERPL-SCNC: 132 MMOL/L (ref 136–145)
TRIGL SERPL-MCNC: 120 MG/DL (ref 0–150)
VLDLC SERPL-MCNC: 22 MG/DL (ref 5–40)
WBC NRBC COR # BLD AUTO: 6.53 10*3/MM3 (ref 3.4–10.8)

## 2024-10-08 PROCEDURE — 36415 COLL VENOUS BLD VENIPUNCTURE: CPT

## 2024-10-08 PROCEDURE — 85025 COMPLETE CBC W/AUTO DIFF WBC: CPT

## 2024-10-08 PROCEDURE — 80061 LIPID PANEL: CPT

## 2024-10-08 PROCEDURE — 80053 COMPREHEN METABOLIC PANEL: CPT

## 2024-10-08 PROCEDURE — G0103 PSA SCREENING: HCPCS

## 2024-12-07 DIAGNOSIS — R60.9 EDEMA, UNSPECIFIED TYPE: ICD-10-CM

## 2024-12-07 DIAGNOSIS — E78.5 HYPERLIPIDEMIA, UNSPECIFIED HYPERLIPIDEMIA TYPE: ICD-10-CM

## 2024-12-07 DIAGNOSIS — N40.0 BENIGN PROSTATIC HYPERPLASIA WITHOUT LOWER URINARY TRACT SYMPTOMS: ICD-10-CM

## 2024-12-07 DIAGNOSIS — I87.2 VENOUS INSUFFICIENCY OF BOTH LOWER EXTREMITIES: ICD-10-CM

## 2024-12-07 DIAGNOSIS — J98.59 MEDIASTINAL MASS: ICD-10-CM

## 2024-12-07 DIAGNOSIS — K44.9 DIAPHRAGMATIC HERNIA WITHOUT OBSTRUCTION OR GANGRENE: ICD-10-CM

## 2024-12-07 DIAGNOSIS — I25.10 CORONARY ARTERY DISEASE INVOLVING NATIVE CORONARY ARTERY OF NATIVE HEART WITHOUT ANGINA PECTORIS: ICD-10-CM

## 2024-12-07 DIAGNOSIS — Z00.00 PHYSICAL EXAM, ANNUAL: ICD-10-CM

## 2024-12-07 DIAGNOSIS — J30.89 ALLERGIC RHINITIS DUE TO OTHER ALLERGIC TRIGGER, UNSPECIFIED SEASONALITY: ICD-10-CM

## 2024-12-07 DIAGNOSIS — Z12.5 SCREENING PSA (PROSTATE SPECIFIC ANTIGEN): ICD-10-CM

## 2024-12-07 DIAGNOSIS — K21.9 GASTROESOPHAGEAL REFLUX DISEASE WITHOUT ESOPHAGITIS: ICD-10-CM

## 2024-12-07 DIAGNOSIS — I10 ESSENTIAL HYPERTENSION: ICD-10-CM

## 2024-12-07 DIAGNOSIS — E78.00 PURE HYPERCHOLESTEROLEMIA: ICD-10-CM

## 2024-12-09 RX ORDER — FLUTICASONE PROPIONATE 50 MCG
2 SPRAY, SUSPENSION (ML) NASAL DAILY
Qty: 16 G | Refills: 2 | Status: SHIPPED | OUTPATIENT
Start: 2024-12-09

## 2025-02-04 ENCOUNTER — LAB (OUTPATIENT)
Dept: LAB | Facility: HOSPITAL | Age: 60
End: 2025-02-04
Payer: COMMERCIAL

## 2025-02-04 ENCOUNTER — TELEPHONE (OUTPATIENT)
Dept: INTERNAL MEDICINE | Age: 60
End: 2025-02-04
Payer: COMMERCIAL

## 2025-02-04 DIAGNOSIS — I10 ESSENTIAL HYPERTENSION: ICD-10-CM

## 2025-02-04 DIAGNOSIS — E78.5 HYPERLIPIDEMIA, UNSPECIFIED HYPERLIPIDEMIA TYPE: ICD-10-CM

## 2025-02-04 DIAGNOSIS — R53.83 FATIGUE, UNSPECIFIED TYPE: ICD-10-CM

## 2025-02-04 DIAGNOSIS — E78.5 HYPERLIPIDEMIA, UNSPECIFIED HYPERLIPIDEMIA TYPE: Primary | ICD-10-CM

## 2025-02-04 LAB
ALBUMIN SERPL-MCNC: 4.4 G/DL (ref 3.5–5.2)
ALBUMIN/GLOB SERPL: 1.7 G/DL
ALP SERPL-CCNC: 55 U/L (ref 39–117)
ALT SERPL W P-5'-P-CCNC: 24 U/L (ref 1–41)
ANION GAP SERPL CALCULATED.3IONS-SCNC: 10 MMOL/L (ref 5–15)
AST SERPL-CCNC: 20 U/L (ref 1–40)
BASOPHILS # BLD AUTO: 0.03 10*3/MM3 (ref 0–0.2)
BASOPHILS NFR BLD AUTO: 0.5 % (ref 0–1.5)
BILIRUB SERPL-MCNC: 0.4 MG/DL (ref 0–1.2)
BUN SERPL-MCNC: 12 MG/DL (ref 6–20)
BUN/CREAT SERPL: 14 (ref 7–25)
CALCIUM SPEC-SCNC: 9.7 MG/DL (ref 8.6–10.5)
CHLORIDE SERPL-SCNC: 98 MMOL/L (ref 98–107)
CHOLEST SERPL-MCNC: 148 MG/DL (ref 0–200)
CO2 SERPL-SCNC: 27 MMOL/L (ref 22–29)
CREAT SERPL-MCNC: 0.86 MG/DL (ref 0.76–1.27)
DEPRECATED RDW RBC AUTO: 40.7 FL (ref 37–54)
EGFRCR SERPLBLD CKD-EPI 2021: 100.4 ML/MIN/1.73
EOSINOPHIL # BLD AUTO: 0.16 10*3/MM3 (ref 0–0.4)
EOSINOPHIL NFR BLD AUTO: 2.5 % (ref 0.3–6.2)
ERYTHROCYTE [DISTWIDTH] IN BLOOD BY AUTOMATED COUNT: 11.9 % (ref 12.3–15.4)
GLOBULIN UR ELPH-MCNC: 2.6 GM/DL
GLUCOSE SERPL-MCNC: 97 MG/DL (ref 65–99)
HCT VFR BLD AUTO: 44.7 % (ref 37.5–51)
HDLC SERPL-MCNC: 43 MG/DL (ref 40–60)
HGB BLD-MCNC: 14.6 G/DL (ref 13–17.7)
IMM GRANULOCYTES # BLD AUTO: 0.02 10*3/MM3 (ref 0–0.05)
IMM GRANULOCYTES NFR BLD AUTO: 0.3 % (ref 0–0.5)
LDLC SERPL CALC-MCNC: 76 MG/DL (ref 0–100)
LDLC/HDLC SERPL: 1.64 {RATIO}
LYMPHOCYTES # BLD AUTO: 2.5 10*3/MM3 (ref 0.7–3.1)
LYMPHOCYTES NFR BLD AUTO: 38.3 % (ref 19.6–45.3)
MCH RBC QN AUTO: 30.6 PG (ref 26.6–33)
MCHC RBC AUTO-ENTMCNC: 32.7 G/DL (ref 31.5–35.7)
MCV RBC AUTO: 93.7 FL (ref 79–97)
MONOCYTES # BLD AUTO: 0.68 10*3/MM3 (ref 0.1–0.9)
MONOCYTES NFR BLD AUTO: 10.4 % (ref 5–12)
NEUTROPHILS NFR BLD AUTO: 3.14 10*3/MM3 (ref 1.7–7)
NEUTROPHILS NFR BLD AUTO: 48 % (ref 42.7–76)
NRBC BLD AUTO-RTO: 0 /100 WBC (ref 0–0.2)
PLATELET # BLD AUTO: 246 10*3/MM3 (ref 140–450)
PMV BLD AUTO: 9.6 FL (ref 6–12)
POTASSIUM SERPL-SCNC: 5.1 MMOL/L (ref 3.5–5.2)
PROT SERPL-MCNC: 7 G/DL (ref 6–8.5)
RBC # BLD AUTO: 4.77 10*6/MM3 (ref 4.14–5.8)
SODIUM SERPL-SCNC: 135 MMOL/L (ref 136–145)
TRIGL SERPL-MCNC: 173 MG/DL (ref 0–150)
VLDLC SERPL-MCNC: 29 MG/DL (ref 5–40)
WBC NRBC COR # BLD AUTO: 6.53 10*3/MM3 (ref 3.4–10.8)

## 2025-02-04 PROCEDURE — 80061 LIPID PANEL: CPT

## 2025-02-04 PROCEDURE — 85025 COMPLETE CBC W/AUTO DIFF WBC: CPT

## 2025-02-04 PROCEDURE — 80053 COMPREHEN METABOLIC PANEL: CPT

## 2025-02-04 PROCEDURE — 36415 COLL VENOUS BLD VENIPUNCTURE: CPT

## 2025-02-10 ENCOUNTER — OFFICE VISIT (OUTPATIENT)
Dept: INTERNAL MEDICINE | Age: 60
End: 2025-02-10
Payer: COMMERCIAL

## 2025-02-10 VITALS
HEART RATE: 77 BPM | WEIGHT: 231.8 LBS | BODY MASS INDEX: 33.18 KG/M2 | SYSTOLIC BLOOD PRESSURE: 137 MMHG | DIASTOLIC BLOOD PRESSURE: 78 MMHG | HEIGHT: 70 IN | TEMPERATURE: 98.7 F | OXYGEN SATURATION: 95 %

## 2025-02-10 DIAGNOSIS — K21.9 GASTROESOPHAGEAL REFLUX DISEASE, UNSPECIFIED WHETHER ESOPHAGITIS PRESENT: ICD-10-CM

## 2025-02-10 DIAGNOSIS — E78.2 MIXED HYPERLIPIDEMIA: ICD-10-CM

## 2025-02-10 DIAGNOSIS — E55.9 VITAMIN D DEFICIENCY: ICD-10-CM

## 2025-02-10 DIAGNOSIS — I87.2 VENOUS INSUFFICIENCY OF BOTH LOWER EXTREMITIES: ICD-10-CM

## 2025-02-10 DIAGNOSIS — I10 ESSENTIAL HYPERTENSION: Primary | ICD-10-CM

## 2025-02-10 PROCEDURE — 99214 OFFICE O/P EST MOD 30 MIN: CPT | Performed by: INTERNAL MEDICINE

## 2025-02-10 NOTE — PROGRESS NOTES
"Chief Complaint   Patient presents with    Venous insufficiency of both lower extremities    Hypertension    Coronary artery disease involving native coronary artery of    Mediastinal mass    Edema, unspecified type    Benign prostatic hyperplasia without lower urinary tract sy    Gastroesophageal reflux disease without esophagitis    Hyperlipidemia    Pure hypercholesterolemia     Diaphragmatic hernia without obstruction or gangrene        Objective   Vital Signs  Vitals:    02/10/25 1505   BP: 137/78   BP Location: Right arm   Patient Position: Sitting   Cuff Size: Adult   Pulse: 77   Temp: 98.7 °F (37.1 °C)   TempSrc: Temporal   SpO2: 95%   Weight: 105 kg (231 lb 12.8 oz)   Height: 177.8 cm (70\")      Body mass index is 33.26 kg/m².  Review of Systems   Constitutional: Negative.    HENT: Negative.     Eyes: Negative.    Respiratory: Negative.     Cardiovascular: Negative.    Gastrointestinal: Negative.    Endocrine: Negative.    Genitourinary: Negative.    Musculoskeletal: Negative.    Allergic/Immunologic: Negative.    Neurological: Negative.    Hematological: Negative.    Psychiatric/Behavioral: Negative.        Physical Exam  Constitutional:       General: He is not in acute distress.     Appearance: Normal appearance.   HENT:      Head: Normocephalic.      Mouth/Throat:      Mouth: Mucous membranes are moist.   Eyes:      Conjunctiva/sclera: Conjunctivae normal.      Pupils: Pupils are equal, round, and reactive to light.   Cardiovascular:      Rate and Rhythm: Normal rate and regular rhythm.      Pulses: Normal pulses.      Heart sounds: Normal heart sounds.   Pulmonary:      Effort: Pulmonary effort is normal.      Breath sounds: Normal breath sounds.   Abdominal:      General: Bowel sounds are normal.      Palpations: Abdomen is soft.   Musculoskeletal:         General: No swelling. Normal range of motion.      Cervical back: Neck supple.      Right lower leg: Edema present.      Left lower leg: Edema " present.   Skin:     General: Skin is warm and dry.      Coloration: Skin is not jaundiced.   Neurological:      General: No focal deficit present.      Mental Status: He is alert and oriented to person, place, and time. Mental status is at baseline.   Psychiatric:         Mood and Affect: Mood normal.         Behavior: Behavior normal.         Thought Content: Thought content normal.         Judgment: Judgment normal.        Result Review :   Lab Results   Component Value Date    PROBNP 24.9 05/01/2023     CMP          7/1/2024    11:13 10/8/2024    08:17 2/4/2025    08:52   CMP   Glucose 96  93  97    BUN 13  10  12    Creatinine 0.87  0.95  0.86    EGFR 100.6  93.4  100.4    Sodium 134  132  135    Potassium 4.8  5.2  5.1    Chloride 97  95  98    Calcium 10.1  10.1  9.7    Total Protein 7.1  6.8  7.0    Albumin 4.2  4.2  4.4    Globulin 2.9  2.6  2.6    Total Bilirubin 0.4  0.4  0.4    Alkaline Phosphatase 64  59  55    AST (SGOT) 16  17  20    ALT (SGPT) 26  20  24    Albumin/Globulin Ratio 1.4  1.6  1.7    BUN/Creatinine Ratio 14.9  10.5  14.0    Anion Gap 10.5  11.0  10.0      CBC w/diff          7/1/2024    11:13 10/8/2024    08:17 2/4/2025    08:52   CBC w/Diff   WBC 6.95  6.53  6.53    RBC 4.43  4.49  4.77    Hemoglobin 13.7  14.6  14.6    Hematocrit 40.8  42.6  44.7    MCV 92.1  94.9  93.7    MCH 30.9  32.5  30.6    MCHC 33.6  34.3  32.7    RDW 12.4  12.2  11.9    Platelets 434  300  246    Neutrophil Rel % 41.3  49.5  48.0    Immature Granulocyte Rel % 0.3  0.5  0.3    Lymphocyte Rel % 43.9  36.1  38.3    Monocyte Rel % 11.7  10.6  10.4    Eosinophil Rel % 2.2  2.8  2.5    Basophil Rel % 0.6  0.5  0.5       Lipid Panel          7/1/2024    11:13 10/8/2024    08:17 2/4/2025    08:52   Lipid Panel   Total Cholesterol 159  143  148    Triglycerides 229  120  173    HDL Cholesterol 38  43  43    VLDL Cholesterol 38  22  29    LDL Cholesterol  83  78  76    LDL/HDL Ratio 1.98  1.77  1.64       Lab Results    Component Value Date    TSH 3.030 10/17/2023    TSH 3.060 05/08/2023    TSH 2.840 04/06/2022      Lab Results   Component Value Date    FREET4 1.21 10/17/2023    FREET4 1.0 06/26/2020         PSA          10/8/2024    08:17   PSA   PSA 2.640                     Visit Diagnoses:    ICD-10-CM ICD-9-CM   1. Essential hypertension  I10 401.9   2. Venous insufficiency of both lower extremities  I87.2 459.81   3. Mixed hyperlipidemia  E78.2 272.2   4. Vitamin D deficiency  E55.9 268.9   5. Gastroesophageal reflux disease, unspecified whether esophagitis present  K21.9 530.81       Assessment and Plan   Diagnoses and all orders for this visit:    1. Essential hypertension (Primary)  -     Comprehensive Metabolic Panel; Future  -     CBC & Differential; Future  -     Lipid Panel; Future    2. Venous insufficiency of both lower extremities  -     Comprehensive Metabolic Panel; Future  -     CBC & Differential; Future  -     Lipid Panel; Future    3. Mixed hyperlipidemia  -     Comprehensive Metabolic Panel; Future  -     CBC & Differential; Future  -     Lipid Panel; Future    4. Vitamin D deficiency  -     Comprehensive Metabolic Panel; Future  -     CBC & Differential; Future  -     Lipid Panel; Future    5. Gastroesophageal reflux disease, unspecified whether esophagitis present  -     Comprehensive Metabolic Panel; Future  -     CBC & Differential; Future  -     Lipid Panel; Future        BMI is >= 30 and <35. (Class 1 Obesity). The following options were offered after discussion;: weight loss educational material (shared in after visit summary) and exercise counseling/recommendations    Hyponatremia improved February 4, 2025      Annual exam, preventive measures discussed, he wears a seatbelt, he does not smoke, does not drink heavily, encouraged him to continue exercise walking on a regular basis and to monitor his diet with low-carb Mediterranean type diet if possible discussed July 9, 2024    Htn-----cont  lisinopril 20 mg qd, Coreg extended release 40 mg daily, furosemide 20 mg daily,     nuclear medicine stress test, May 31, 2023, -----normal ejection fraction normal perfusion study ----no evidence of ischemia ,  low risk study    Le edema , chronic venous insufficiency---- cont lasix 20 mg qd ----venous insufficiency testing, April 28, 2023 shows no evidence of DVT in either leg,-----added Lotensin HCTZ, did not tolerate the compression devices through the lymphedema clinic,    Marcela , discussed wgt loss---    Bph , cont flomax 0,4 mg qhs,, Gemtesa 75 mg daily    Gerd , cont omeprazole 20 mg qd     Vitamin D deficiency, continues vitamin D replacement 5000 units daily    Hyperlipidemia continues Crestor 40 mg daily     Quit tobacco 2017--several cigarettes a day on average, low-dose CT scan for lung cancer screening, --- shows tiny likely benign lung nodules recommended continued annual screening,   there is biapical scarring without active pulmonary process ,   there is an incidental rounded hypodense lesion in the anterior mediastinal area differential diagnosis includes a thymoma versus a thymic cyst no overly suspicious features to suggest cancer or malignancy and outside CT would be helpful and nonemergent cardiothoracic surgical consultation could be considered, he does have aortic and coronary atherosclerotic disease, -----patient did go see Dr. Harsh Martinez at Dr. Sterling's office in [place], PET scan CT scan performed October 16, 2023 showed a 2.2 x 1.7 cm left anterior mediastinal mass consistent with a cyst photopenic on PET scan,   no hypermetabolic lymphadenopathy or suspicious activity in the mediastinum or hilum and no suspicious activity in the neck abdomen or pelvis--------wanted to take it out with robotic thoracotomy ? 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 Browning September 12, 2024, tubular adenoma on path report      Follow Up   Return in about 6 months (around 8/10/2025).  Patient was given instructions and counseling regarding his condition or for health maintenance advice. Please see specific information pulled into the AVS if appropriate.

## 2025-03-05 DIAGNOSIS — Z12.5 SCREENING PSA (PROSTATE SPECIFIC ANTIGEN): ICD-10-CM

## 2025-03-05 DIAGNOSIS — J98.59 MEDIASTINAL MASS: ICD-10-CM

## 2025-03-05 DIAGNOSIS — K44.9 DIAPHRAGMATIC HERNIA WITHOUT OBSTRUCTION OR GANGRENE: ICD-10-CM

## 2025-03-05 DIAGNOSIS — I25.10 CORONARY ARTERY DISEASE INVOLVING NATIVE CORONARY ARTERY OF NATIVE HEART WITHOUT ANGINA PECTORIS: ICD-10-CM

## 2025-03-05 DIAGNOSIS — Z00.00 PHYSICAL EXAM, ANNUAL: ICD-10-CM

## 2025-03-05 DIAGNOSIS — K21.9 GASTROESOPHAGEAL REFLUX DISEASE WITHOUT ESOPHAGITIS: ICD-10-CM

## 2025-03-05 DIAGNOSIS — I10 ESSENTIAL HYPERTENSION: ICD-10-CM

## 2025-03-05 DIAGNOSIS — J30.89 ALLERGIC RHINITIS DUE TO OTHER ALLERGIC TRIGGER, UNSPECIFIED SEASONALITY: ICD-10-CM

## 2025-03-05 DIAGNOSIS — I87.2 VENOUS INSUFFICIENCY OF BOTH LOWER EXTREMITIES: ICD-10-CM

## 2025-03-05 DIAGNOSIS — E78.00 PURE HYPERCHOLESTEROLEMIA: ICD-10-CM

## 2025-03-05 DIAGNOSIS — N40.0 BENIGN PROSTATIC HYPERPLASIA WITHOUT LOWER URINARY TRACT SYMPTOMS: ICD-10-CM

## 2025-03-05 DIAGNOSIS — R60.9 EDEMA, UNSPECIFIED TYPE: ICD-10-CM

## 2025-03-05 DIAGNOSIS — E78.5 HYPERLIPIDEMIA, UNSPECIFIED HYPERLIPIDEMIA TYPE: ICD-10-CM

## 2025-03-05 RX ORDER — FLUTICASONE PROPIONATE 50 MCG
2 SPRAY, SUSPENSION (ML) NASAL DAILY
Qty: 16 G | Refills: 2 | Status: SHIPPED | OUTPATIENT
Start: 2025-03-05

## 2025-03-28 ENCOUNTER — TELEPHONE (OUTPATIENT)
Dept: INTERNAL MEDICINE | Age: 60
End: 2025-03-28
Payer: COMMERCIAL

## 2025-03-28 DIAGNOSIS — E78.2 MIXED HYPERLIPIDEMIA: ICD-10-CM

## 2025-03-28 DIAGNOSIS — I10 ESSENTIAL HYPERTENSION: Primary | ICD-10-CM

## 2025-03-28 DIAGNOSIS — I87.2 VENOUS INSUFFICIENCY OF BOTH LOWER EXTREMITIES: ICD-10-CM

## 2025-03-28 DIAGNOSIS — E78.5 HYPERLIPIDEMIA, UNSPECIFIED HYPERLIPIDEMIA TYPE: ICD-10-CM

## 2025-03-28 DIAGNOSIS — I25.10 CORONARY ARTERY DISEASE INVOLVING NATIVE CORONARY ARTERY OF NATIVE HEART WITHOUT ANGINA PECTORIS: ICD-10-CM

## 2025-03-28 RX ORDER — LOSARTAN POTASSIUM 50 MG/1
50 TABLET ORAL DAILY
Qty: 90 TABLET | Refills: 3 | Status: SHIPPED | OUTPATIENT
Start: 2025-03-28

## 2025-03-28 NOTE — TELEPHONE ENCOUNTER
Call pt with concerns and change benazepril to losartan 50 mg qd   #90 w 3 and let him know you called this in

## 2025-04-02 ENCOUNTER — TELEPHONE (OUTPATIENT)
Dept: INTERNAL MEDICINE | Age: 60
End: 2025-04-02
Payer: COMMERCIAL

## 2025-04-02 RX ORDER — FINASTERIDE 1 MG/1
1 TABLET, FILM COATED ORAL DAILY
Qty: 30 TABLET | Refills: 5 | Status: SHIPPED | OUTPATIENT
Start: 2025-04-02

## 2025-07-01 DIAGNOSIS — I87.2 VENOUS INSUFFICIENCY OF BOTH LOWER EXTREMITIES: ICD-10-CM

## 2025-07-01 DIAGNOSIS — E78.00 PURE HYPERCHOLESTEROLEMIA, UNSPECIFIED: ICD-10-CM

## 2025-07-01 DIAGNOSIS — I25.10 CORONARY ARTERY DISEASE INVOLVING NATIVE CORONARY ARTERY OF NATIVE HEART WITHOUT ANGINA PECTORIS: ICD-10-CM

## 2025-07-01 DIAGNOSIS — J30.89 ALLERGIC RHINITIS DUE TO OTHER ALLERGIC TRIGGER, UNSPECIFIED SEASONALITY: ICD-10-CM

## 2025-07-01 DIAGNOSIS — K21.9 GASTROESOPHAGEAL REFLUX DISEASE WITHOUT ESOPHAGITIS: ICD-10-CM

## 2025-07-01 DIAGNOSIS — E78.5 HYPERLIPIDEMIA, UNSPECIFIED HYPERLIPIDEMIA TYPE: ICD-10-CM

## 2025-07-01 DIAGNOSIS — R60.9 EDEMA, UNSPECIFIED TYPE: ICD-10-CM

## 2025-07-01 DIAGNOSIS — Z12.5 SCREENING PSA (PROSTATE SPECIFIC ANTIGEN): ICD-10-CM

## 2025-07-01 DIAGNOSIS — I10 ESSENTIAL HYPERTENSION: ICD-10-CM

## 2025-07-01 DIAGNOSIS — N40.0 BENIGN PROSTATIC HYPERPLASIA WITHOUT LOWER URINARY TRACT SYMPTOMS: ICD-10-CM

## 2025-07-01 DIAGNOSIS — E78.00 PURE HYPERCHOLESTEROLEMIA: ICD-10-CM

## 2025-07-01 DIAGNOSIS — J98.59 MEDIASTINAL MASS: ICD-10-CM

## 2025-07-01 DIAGNOSIS — J30.89 OTHER ALLERGIC RHINITIS: ICD-10-CM

## 2025-07-01 DIAGNOSIS — K44.9 DIAPHRAGMATIC HERNIA WITHOUT OBSTRUCTION OR GANGRENE: ICD-10-CM

## 2025-07-01 DIAGNOSIS — Z00.00 PHYSICAL EXAM, ANNUAL: ICD-10-CM

## 2025-07-01 RX ORDER — ASPIRIN 81 MG/1
81 TABLET, COATED ORAL DAILY
Qty: 90 TABLET | Refills: 2 | Status: SHIPPED | OUTPATIENT
Start: 2025-07-01

## 2025-07-01 RX ORDER — FLUTICASONE PROPIONATE 50 MCG
2 SPRAY, SUSPENSION (ML) NASAL DAILY
Qty: 16 G | Refills: 2 | Status: SHIPPED | OUTPATIENT
Start: 2025-07-01

## 2025-07-01 RX ORDER — VIBEGRON 75 MG/1
1 TABLET, FILM COATED ORAL DAILY
Qty: 90 TABLET | Refills: 2 | Status: SHIPPED | OUTPATIENT
Start: 2025-07-01

## 2025-07-01 RX ORDER — OMEPRAZOLE 20 MG/1
20 CAPSULE, DELAYED RELEASE ORAL DAILY
Qty: 90 CAPSULE | Refills: 2 | Status: SHIPPED | OUTPATIENT
Start: 2025-07-01

## 2025-07-01 RX ORDER — CARVEDILOL PHOSPHATE 40 MG/1
40 CAPSULE, EXTENDED RELEASE ORAL DAILY
Qty: 90 CAPSULE | Refills: 3 | Status: SHIPPED | OUTPATIENT
Start: 2025-07-01

## 2025-07-01 RX ORDER — ROSUVASTATIN CALCIUM 40 MG/1
40 TABLET, COATED ORAL DAILY
Qty: 90 TABLET | Refills: 2 | Status: SHIPPED | OUTPATIENT
Start: 2025-07-01

## 2025-07-07 ENCOUNTER — LAB (OUTPATIENT)
Dept: LAB | Facility: HOSPITAL | Age: 60
End: 2025-07-07
Payer: COMMERCIAL

## 2025-07-07 ENCOUNTER — APPOINTMENT (OUTPATIENT)
Dept: CT IMAGING | Facility: HOSPITAL | Age: 60
End: 2025-07-07
Payer: COMMERCIAL

## 2025-07-07 DIAGNOSIS — K21.9 GASTROESOPHAGEAL REFLUX DISEASE, UNSPECIFIED WHETHER ESOPHAGITIS PRESENT: ICD-10-CM

## 2025-07-07 DIAGNOSIS — E55.9 VITAMIN D DEFICIENCY: ICD-10-CM

## 2025-07-07 DIAGNOSIS — E78.2 MIXED HYPERLIPIDEMIA: ICD-10-CM

## 2025-07-07 DIAGNOSIS — I10 ESSENTIAL HYPERTENSION: ICD-10-CM

## 2025-07-07 DIAGNOSIS — I87.2 VENOUS INSUFFICIENCY OF BOTH LOWER EXTREMITIES: ICD-10-CM

## 2025-07-07 LAB
ALBUMIN SERPL-MCNC: 4.2 G/DL (ref 3.5–5.2)
ALBUMIN/GLOB SERPL: 1.6 G/DL
ALP SERPL-CCNC: 52 U/L (ref 39–117)
ALT SERPL W P-5'-P-CCNC: 19 U/L (ref 1–41)
ANION GAP SERPL CALCULATED.3IONS-SCNC: 13 MMOL/L (ref 5–15)
AST SERPL-CCNC: 20 U/L (ref 1–40)
BASOPHILS # BLD AUTO: 0.03 10*3/MM3 (ref 0–0.2)
BASOPHILS NFR BLD AUTO: 0.5 % (ref 0–1.5)
BILIRUB SERPL-MCNC: 0.4 MG/DL (ref 0–1.2)
BUN SERPL-MCNC: 11 MG/DL (ref 6–20)
BUN/CREAT SERPL: 12.4 (ref 7–25)
CALCIUM SPEC-SCNC: 9.6 MG/DL (ref 8.6–10.5)
CHLORIDE SERPL-SCNC: 97 MMOL/L (ref 98–107)
CHOLEST SERPL-MCNC: 138 MG/DL (ref 0–200)
CO2 SERPL-SCNC: 25 MMOL/L (ref 22–29)
CREAT SERPL-MCNC: 0.89 MG/DL (ref 0.76–1.27)
DEPRECATED RDW RBC AUTO: 41.2 FL (ref 37–54)
EGFRCR SERPLBLD CKD-EPI 2021: 99.3 ML/MIN/1.73
EOSINOPHIL # BLD AUTO: 0.12 10*3/MM3 (ref 0–0.4)
EOSINOPHIL NFR BLD AUTO: 1.9 % (ref 0.3–6.2)
ERYTHROCYTE [DISTWIDTH] IN BLOOD BY AUTOMATED COUNT: 11.9 % (ref 12.3–15.4)
GLOBULIN UR ELPH-MCNC: 2.6 GM/DL
GLUCOSE SERPL-MCNC: 109 MG/DL (ref 65–99)
HCT VFR BLD AUTO: 41.8 % (ref 37.5–51)
HDLC SERPL-MCNC: 38 MG/DL (ref 40–60)
HGB BLD-MCNC: 14 G/DL (ref 13–17.7)
IMM GRANULOCYTES # BLD AUTO: 0.04 10*3/MM3 (ref 0–0.05)
IMM GRANULOCYTES NFR BLD AUTO: 0.6 % (ref 0–0.5)
LDLC SERPL CALC-MCNC: 75 MG/DL (ref 0–100)
LDLC/HDLC SERPL: 1.88 {RATIO}
LYMPHOCYTES # BLD AUTO: 2.52 10*3/MM3 (ref 0.7–3.1)
LYMPHOCYTES NFR BLD AUTO: 39.6 % (ref 19.6–45.3)
MCH RBC QN AUTO: 31.4 PG (ref 26.6–33)
MCHC RBC AUTO-ENTMCNC: 33.5 G/DL (ref 31.5–35.7)
MCV RBC AUTO: 93.7 FL (ref 79–97)
MONOCYTES # BLD AUTO: 0.73 10*3/MM3 (ref 0.1–0.9)
MONOCYTES NFR BLD AUTO: 11.5 % (ref 5–12)
NEUTROPHILS NFR BLD AUTO: 2.92 10*3/MM3 (ref 1.7–7)
NEUTROPHILS NFR BLD AUTO: 45.9 % (ref 42.7–76)
NRBC BLD AUTO-RTO: 0 /100 WBC (ref 0–0.2)
PLATELET # BLD AUTO: 258 10*3/MM3 (ref 140–450)
PMV BLD AUTO: 9.5 FL (ref 6–12)
POTASSIUM SERPL-SCNC: 4.9 MMOL/L (ref 3.5–5.2)
PROT SERPL-MCNC: 6.8 G/DL (ref 6–8.5)
RBC # BLD AUTO: 4.46 10*6/MM3 (ref 4.14–5.8)
SODIUM SERPL-SCNC: 135 MMOL/L (ref 136–145)
TRIGL SERPL-MCNC: 142 MG/DL (ref 0–150)
VLDLC SERPL-MCNC: 25 MG/DL (ref 5–40)
WBC NRBC COR # BLD AUTO: 6.36 10*3/MM3 (ref 3.4–10.8)

## 2025-07-07 PROCEDURE — 36415 COLL VENOUS BLD VENIPUNCTURE: CPT

## 2025-07-07 PROCEDURE — 85025 COMPLETE CBC W/AUTO DIFF WBC: CPT

## 2025-07-07 PROCEDURE — 80061 LIPID PANEL: CPT

## 2025-07-07 PROCEDURE — 80053 COMPREHEN METABOLIC PANEL: CPT

## 2025-07-10 ENCOUNTER — TELEPHONE (OUTPATIENT)
Dept: SURGERY | Facility: CLINIC | Age: 60
End: 2025-07-10
Payer: COMMERCIAL

## 2025-07-10 NOTE — TELEPHONE ENCOUNTER
LVM letting patient know that we need to reschedule his appointment with dr. Henderson. Advised that the CT report will not be back same day and we need to reschedule his appointment. Provided him with the office number to reschedule his appointment

## 2025-07-14 ENCOUNTER — HOSPITAL ENCOUNTER (OUTPATIENT)
Dept: CT IMAGING | Facility: HOSPITAL | Age: 60
Discharge: HOME | End: 2025-07-14
Admitting: NURSE PRACTITIONER
Payer: COMMERCIAL

## 2025-07-14 DIAGNOSIS — J98.59 MEDIASTINAL MASS: ICD-10-CM

## 2025-07-14 PROCEDURE — 71250 CT THORAX DX C-: CPT

## 2025-07-21 ENCOUNTER — OFFICE VISIT (OUTPATIENT)
Dept: SURGERY | Facility: CLINIC | Age: 60
End: 2025-07-21
Payer: COMMERCIAL

## 2025-07-21 VITALS
RESPIRATION RATE: 18 BRPM | WEIGHT: 244.4 LBS | SYSTOLIC BLOOD PRESSURE: 132 MMHG | BODY MASS INDEX: 35.07 KG/M2 | DIASTOLIC BLOOD PRESSURE: 98 MMHG | OXYGEN SATURATION: 96 % | HEART RATE: 82 BPM

## 2025-07-21 DIAGNOSIS — N40.0 BENIGN PROSTATIC HYPERPLASIA WITHOUT LOWER URINARY TRACT SYMPTOMS: ICD-10-CM

## 2025-07-21 DIAGNOSIS — I10 ESSENTIAL HYPERTENSION: ICD-10-CM

## 2025-07-21 DIAGNOSIS — I87.2 VENOUS INSUFFICIENCY OF BOTH LOWER EXTREMITIES: ICD-10-CM

## 2025-07-21 DIAGNOSIS — K21.9 GASTROESOPHAGEAL REFLUX DISEASE WITHOUT ESOPHAGITIS: ICD-10-CM

## 2025-07-21 DIAGNOSIS — I25.10 CORONARY ARTERY DISEASE INVOLVING NATIVE CORONARY ARTERY OF NATIVE HEART WITHOUT ANGINA PECTORIS: ICD-10-CM

## 2025-07-21 DIAGNOSIS — R60.9 EDEMA, UNSPECIFIED TYPE: ICD-10-CM

## 2025-07-21 DIAGNOSIS — Z12.5 SCREENING PSA (PROSTATE SPECIFIC ANTIGEN): ICD-10-CM

## 2025-07-21 DIAGNOSIS — Z00.00 PHYSICAL EXAM, ANNUAL: ICD-10-CM

## 2025-07-21 DIAGNOSIS — J30.89 ALLERGIC RHINITIS DUE TO OTHER ALLERGIC TRIGGER, UNSPECIFIED SEASONALITY: ICD-10-CM

## 2025-07-21 DIAGNOSIS — K44.9 DIAPHRAGMATIC HERNIA WITHOUT OBSTRUCTION OR GANGRENE: ICD-10-CM

## 2025-07-21 DIAGNOSIS — E78.5 HYPERLIPIDEMIA, UNSPECIFIED HYPERLIPIDEMIA TYPE: ICD-10-CM

## 2025-07-21 DIAGNOSIS — J98.59 MEDIASTINAL MASS: ICD-10-CM

## 2025-07-21 DIAGNOSIS — J98.59 MEDIASTINAL MASS: Primary | ICD-10-CM

## 2025-07-21 DIAGNOSIS — E78.00 PURE HYPERCHOLESTEROLEMIA: ICD-10-CM

## 2025-07-21 RX ORDER — CHOLECALCIFEROL TAB 125 MCG (5000 UNIT) 125 MCG (5000 UT)
1 TAB DAILY
Qty: 90 TABLET | Refills: 3 | Status: SHIPPED | OUTPATIENT
Start: 2025-07-21

## 2025-07-21 RX ORDER — FUROSEMIDE 20 MG/1
20 TABLET ORAL DAILY
Qty: 90 TABLET | Refills: 3 | Status: SHIPPED | OUTPATIENT
Start: 2025-07-21

## 2025-07-21 RX ORDER — TAMSULOSIN HYDROCHLORIDE 0.4 MG/1
1 CAPSULE ORAL DAILY
Qty: 90 CAPSULE | Refills: 3 | Status: SHIPPED | OUTPATIENT
Start: 2025-07-21

## 2025-07-21 NOTE — PROGRESS NOTES
"Chief Complaint  Mediastinal mass (CT 7/14- 1 YR FU)    Subjective        History of Present Illness  The patient presents for a cystic lesion within the mediastinum.  He has been established with our practice with Dr. Martinez since October 2023.  He is a former smoker.  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  He recently returned from a trip to [place] in [place].  He has no new complaints.        Objective   Vital Signs:  /98 (BP Location: Left arm, Patient Position: Sitting, Cuff Size: Adult)   Pulse 82   Resp 18   Wt 111 kg (244 lb 6.4 oz)   SpO2 96%   BMI 35.07 kg/m²   Estimated body mass index is 35.07 kg/m² as calculated from the following:    Height as of 2/10/25: 177.8 cm (70\").    Weight as of this encounter: 111 kg (244 lb 6.4 oz).            Physical Exam  Constitutional:       General: He is not in acute distress.     Appearance: Normal appearance. He is not ill-appearing.   HENT:      Head: Normocephalic and atraumatic.   Cardiovascular:      Rate and Rhythm: Normal rate.   Pulmonary:      Effort: Pulmonary effort is normal. No respiratory distress.   Musculoskeletal:         General: Normal range of motion.      Cervical back: Normal range of motion and neck supple.   Skin:     General: Skin is warm and dry.   Neurological:      General: No focal deficit present.      Mental Status: He is alert. Mental status is at baseline.      Motor: No weakness.   Psychiatric:         Mood and Affect: Mood normal.         Thought Content: Thought content normal.          Physical Exam      Result Review :         The following results are independently reviewed and interpreted by myself.     Results  Imaging  CT scan 7/14/2025 no emphysema or cancer. A small cystic lesion measuring 2.4 x 2.1 is present.  This is unchanged from previous imaging.           Assessment and Plan   Diagnoses and all orders for this visit:    1. Mediastinal mass (Primary)  -     CT Chest " Without Contrast; Future        Assessment & Plan  1. Cystic lesion.  A stable small cystic lesion within the mediastinum measuring 2.4 x 2.1 cm was identified on the CT scan. The lesion is not growing and remains stable in size. A follow-up CT scan will be scheduled at [facility] to monitor the cystic lesion in 1 year.  Will follow[facility]up with him at that time at [facility].       I spent 22 minutes caring for Jose Luis on this date of service. This time includes time spent by me in the following activities:preparing for the visit, reviewing tests, obtaining and/or reviewing a separately obtained history, performing a medically appropriate examination and/or evaluation , counseling and educating the patient/family/caregiver, ordering medications, tests, or procedures, referring and communicating with other health care professionals , documenting information in the medical record, independently interpreting results and communicating that information with the patient/family/caregiver, and care coordination  Follow Up   Return in about 1 year (around 7/21/2026) for Next scheduled follow up.  Patient was given instructions and counseling regarding his condition or for health maintenance advice. Please see specific information pulled into the AVS if appropriate.     Patient or patient representative verbalized consent for the use of Ambient Listening during the visit with  Elza Ang DNP, APRN for chart documentation. 7/21/2025  17:14 EDT

## 2025-08-12 ENCOUNTER — OFFICE VISIT (OUTPATIENT)
Dept: INTERNAL MEDICINE | Age: 60
End: 2025-08-12
Payer: COMMERCIAL

## 2025-08-12 VITALS
WEIGHT: 240.6 LBS | TEMPERATURE: 97.6 F | DIASTOLIC BLOOD PRESSURE: 76 MMHG | HEIGHT: 70 IN | OXYGEN SATURATION: 97 % | SYSTOLIC BLOOD PRESSURE: 116 MMHG | HEART RATE: 72 BPM | BODY MASS INDEX: 34.44 KG/M2

## 2025-08-12 DIAGNOSIS — R60.9 EDEMA, UNSPECIFIED TYPE: ICD-10-CM

## 2025-08-12 DIAGNOSIS — E66.09 CLASS 1 OBESITY DUE TO EXCESS CALORIES WITHOUT SERIOUS COMORBIDITY WITH BODY MASS INDEX (BMI) OF 32.0 TO 32.9 IN ADULT: ICD-10-CM

## 2025-08-12 DIAGNOSIS — E66.811 CLASS 1 OBESITY DUE TO EXCESS CALORIES WITHOUT SERIOUS COMORBIDITY WITH BODY MASS INDEX (BMI) OF 32.0 TO 32.9 IN ADULT: ICD-10-CM

## 2025-08-12 DIAGNOSIS — M25.511 ACUTE PAIN OF RIGHT SHOULDER: ICD-10-CM

## 2025-08-12 DIAGNOSIS — E78.2 MIXED HYPERLIPIDEMIA: ICD-10-CM

## 2025-08-12 DIAGNOSIS — N40.0 BENIGN PROSTATIC HYPERPLASIA WITHOUT LOWER URINARY TRACT SYMPTOMS: ICD-10-CM

## 2025-08-12 DIAGNOSIS — M75.51 BURSITIS OF RIGHT SHOULDER: ICD-10-CM

## 2025-08-12 DIAGNOSIS — Z00.00 PHYSICAL EXAM, ANNUAL: Primary | ICD-10-CM

## 2025-08-12 DIAGNOSIS — I10 ESSENTIAL HYPERTENSION: ICD-10-CM

## 2025-08-12 DIAGNOSIS — I87.2 VENOUS INSUFFICIENCY OF BOTH LOWER EXTREMITIES: ICD-10-CM

## 2025-08-12 DIAGNOSIS — E55.9 VITAMIN D DEFICIENCY: ICD-10-CM

## 2025-08-12 DIAGNOSIS — Z12.5 SCREENING PSA (PROSTATE SPECIFIC ANTIGEN): ICD-10-CM

## 2025-08-12 DIAGNOSIS — Z86.0100 HISTORY OF COLON POLYPS: ICD-10-CM

## 2025-08-12 PROCEDURE — 99396 PREV VISIT EST AGE 40-64: CPT | Performed by: INTERNAL MEDICINE

## 2025-08-12 RX ORDER — LOSARTAN POTASSIUM 100 MG/1
100 TABLET ORAL DAILY
Qty: 90 TABLET | Refills: 3 | Status: SHIPPED | OUTPATIENT
Start: 2025-08-12

## 2025-08-12 RX ORDER — FUROSEMIDE 40 MG/1
40 TABLET ORAL DAILY
Qty: 90 TABLET | Refills: 3 | Status: SHIPPED | OUTPATIENT
Start: 2025-08-12

## (undated) DEVICE — Device: Brand: DEFENDO AIR/WATER/SUCTION AND BIOPSY VALVE

## (undated) DEVICE — CONN JET HYDRA H20 AUXILIARY DISP

## (undated) DEVICE — SNAR POLYP CAPTIFLEX XS/OVL 11X2.4MM 240CM 1P/U

## (undated) DEVICE — SOLIDIFIER LIQLOC PLS 1500CC BT

## (undated) DEVICE — SOL SUBMUCUS RTU 10ML

## (undated) DEVICE — LINER SURG CANSTR SXN S/RIGD 1500CC

## (undated) DEVICE — COLON KIT: Brand: MEDLINE INDUSTRIES, INC.

## (undated) DEVICE — Device

## (undated) DEVICE — THE SINGLE USE ETRAP – POLYP TRAP IS USED FOR SUCTION RETRIEVAL OF ENDOSCOPICALLY REMOVED POLYPS.: Brand: ETRAP

## (undated) DEVICE — SOL IRRG H2O PL/BG 1000ML STRL